# Patient Record
Sex: FEMALE | Race: BLACK OR AFRICAN AMERICAN | NOT HISPANIC OR LATINO | Employment: FULL TIME | URBAN - METROPOLITAN AREA
[De-identification: names, ages, dates, MRNs, and addresses within clinical notes are randomized per-mention and may not be internally consistent; named-entity substitution may affect disease eponyms.]

---

## 2017-05-04 ENCOUNTER — ALLSCRIPTS OFFICE VISIT (OUTPATIENT)
Dept: OTHER | Facility: OTHER | Age: 62
End: 2017-05-04

## 2017-05-04 DIAGNOSIS — I83.813 VARICOSE VEINS OF BOTH LOWER EXTREMITIES WITH PAIN: ICD-10-CM

## 2017-05-25 ENCOUNTER — HOSPITAL ENCOUNTER (OUTPATIENT)
Dept: NON INVASIVE DIAGNOSTICS | Facility: CLINIC | Age: 62
Discharge: HOME | End: 2017-05-25
Payer: COMMERCIAL

## 2017-05-25 DIAGNOSIS — I83.813 VARICOSE VEINS OF BOTH LOWER EXTREMITIES WITH PAIN: ICD-10-CM

## 2017-05-25 PROCEDURE — 93970 EXTREMITY STUDY: CPT

## 2017-06-14 ENCOUNTER — ALLSCRIPTS OFFICE VISIT (OUTPATIENT)
Dept: OTHER | Facility: OTHER | Age: 62
End: 2017-06-14

## 2017-08-01 ENCOUNTER — DOCTOR'S OFFICE (OUTPATIENT)
Dept: URBAN - METROPOLITAN AREA CLINIC 137 | Facility: CLINIC | Age: 62
Setting detail: OPHTHALMOLOGY
End: 2017-08-01
Payer: COMMERCIAL

## 2017-08-01 ENCOUNTER — OPTICAL OFFICE (OUTPATIENT)
Dept: URBAN - METROPOLITAN AREA CLINIC 146 | Facility: CLINIC | Age: 62
Setting detail: OPHTHALMOLOGY
End: 2017-08-01

## 2017-08-01 DIAGNOSIS — H52.4: ICD-10-CM

## 2017-08-01 DIAGNOSIS — H52.13: ICD-10-CM

## 2017-08-01 PROCEDURE — 92014 COMPRE OPH EXAM EST PT 1/>: CPT | Performed by: OPHTHALMOLOGY

## 2017-08-01 PROCEDURE — S0500 DISPOS CONT LENS: HCPCS | Performed by: OPHTHALMOLOGY

## 2017-08-01 ASSESSMENT — REFRACTION_OUTSIDERX
OD_VA2: 20/20
OS_ADD: +2.25
OD_SPHERE: -4.75
OD_VA1: 20/20
OS_VA2: 20/20
OD_CYLINDER: SPH
OS_VA3: 20/
OS_VA1: 20/20-
OD_ADD: +2.25
OD_VA3: 20/
OS_CYLINDER: SPH
OU_VA: 20/
OS_SPHERE: -5.25

## 2017-08-01 ASSESSMENT — REFRACTION_MANIFEST
OD_VA1: 20/
OS_VA3: 20/
OD_VA3: 20/
OS_VA3: 20/
OU_VA: 20/
OU_VA: 20/
OS_VA2: 20/
OS_VA1: 20/
OD_VA3: 20/
OD_VA1: 20/
OD_VA2: 20/
OS_VA2: 20/
OD_VA2: 20/
OS_VA1: 20/

## 2017-08-01 ASSESSMENT — REFRACTION_CURRENTRX
OS_SPHERE: -5.75
OD_ADD: +2.25
OD_OVR_VA: 20/
OS_ADD: +2.25
OS_OVR_VA: 20/
OD_SPHERE: -4.75
OD_CYLINDER: SPH
OD_OVR_VA: 20/
OS_CYLINDER: SPH
OS_OVR_VA: 20/
OD_OVR_VA: 20/
OS_OVR_VA: 20/
OD_VPRISM_DIRECTION: PROGS
OS_VPRISM_DIRECTION: BF

## 2017-08-01 ASSESSMENT — REFRACTION_AUTOREFRACTION
OS_CYLINDER: +0.25
OD_SPHERE: -5.00
OS_SPHERE: -5.50
OD_CYLINDER: SPH
OS_AXIS: 059

## 2017-08-01 ASSESSMENT — CONFRONTATIONAL VISUAL FIELD TEST (CVF)
OS_FINDINGS: FULL
OD_FINDINGS: FULL

## 2017-08-01 ASSESSMENT — VISUAL ACUITY
OS_BCVA: 20/20
OD_BCVA: 20/40

## 2017-08-01 ASSESSMENT — SPHEQUIV_DERIVED: OS_SPHEQUIV: -5.375

## 2017-10-03 ENCOUNTER — ALLSCRIPTS OFFICE VISIT (OUTPATIENT)
Dept: OTHER | Facility: OTHER | Age: 62
End: 2017-10-03

## 2017-10-03 ENCOUNTER — GENERIC CONVERSION - ENCOUNTER (OUTPATIENT)
Dept: OTHER | Facility: OTHER | Age: 62
End: 2017-10-03

## 2017-10-03 DIAGNOSIS — Z12.31 ENCOUNTER FOR SCREENING MAMMOGRAM FOR MALIGNANT NEOPLASM OF BREAST: ICD-10-CM

## 2017-10-03 DIAGNOSIS — R29.890 LOSS OF HEIGHT: ICD-10-CM

## 2017-10-17 ENCOUNTER — ALLSCRIPTS OFFICE VISIT (OUTPATIENT)
Dept: OTHER | Facility: OTHER | Age: 62
End: 2017-10-17

## 2018-01-11 NOTE — PROGRESS NOTES
Assessment    1  Varicose veins of bilateral lower extremities with pain (454 8) (U56 878)    Plan    1  Begin or continue regular aerobic exercise  Gradually work up to at least {count1}   sessions of {dur1} of exercise a week ; Status:Complete;   Done: [id number]   2  Support stockings can help keep the blood from pooling in the small veins in your feet   and legs ; Status:Complete;   Done: 92NOH7064   3  Gradient compression stocking, below knee, 20-30mm Hg, each; Status:Complete;     Done: [id number]   4  VAS REFLUX LOWER LIMB   ; Status:Active; Requested for:[id number];    5  Follow Up After Tests Complete Evaluation and Treatment  Follow-up  Status: Complete    Done: [id number]    Discussion/Summary  Discussion Summary: This patient has bilateral lower extremity varicosities with associated aching, itching, throbbing pain  She reports history of venous procedure to bilateral legs (unsure if ligation/stripping- no incisions, states it was not EVLT) in early 2002 at [address] [place] [address] and sclerotherapy (will request report)  Since that time she has been wearing prescription grade compression on a daily basis  She recently lost 30 pounds and she exercises daily  Despite conservative management her symptoms have continued to worsen  Will check a venous reflux assessment and she will return to the office with a physician for review  Chief Complaint  Chief Complaint Free Text Note Form: "My legs have been throbbing and aching for 3 years "       History of Present Illness  Varicose Veins [facility]: The patient is being seen for a consultation regarding varicose veins  Symptoms:  bilateral bulging veins, bilateral spider veins, itching bilaterally and bilateral leg pain, but no leg swelling, no muscle cramps, no stasis dermatitis, no cellulitis, no hyperpigmentation, no venous ulcers, no dry skin and no bleeding  The patient describes the pain as aching, itching and throbbing     This patient has no history of DVT, pulmonary embolism, superficial venous thrombosis, or a hypercoagulable state  Evaluation and Treatment History: This patient has had previous surgical treatment which has included injection sclerotherapy ? "venous procedure I don't know what it was" in 2002  Current treatment includes This patient is using knee high  20-30 mmHg compression hose  This patient is exercising   This patient is attempting weight management   This patient is using periodic leg elevation   Efficacy of conservative treatment: The conservative measures have not helped the patient's symptoms  Free Text HPI: This patient is new to our practice, referred by Dr Corry Talamantes for evaluation of painful varicose veins  Patient complains of bilateral lower extremity itching, throbbing, aching pain  She is a nurse at [employer], standing long periods, which 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  She reports history of venous procedure in 2002, by Dr Pierre Prieto, plastic surgeon  She states that she was "out" for the procedure, but says it was not a laser procedure, yet does not have leg incisions consistent with ligation or stripping  She thinks it was "just injections " One leg was done at a time  (Will request report from [employer])  She has been wearing prescription great compression daily since that time  She elevates periodically  She recently lost 30 pounds and goes to the gym on a near daily basis  Despite these conservative therapies she continues to have worsening pain to the legs  Review of Systems  Complete Female - Vasc:   Constitutional: No fever or chills, feels well, no tiredness, no recent weight gain or weight loss  Eyes: no sudden vision loss, no blurred vision and no double vision, but no eye pain, no eyesight problems, no dryness of the eyes, eyes not red, no purulent discharge from the eyes, no itching of the eyes and wears glasses  ENT: no loss of hearing, no nosebleeds, no hoarseness     Cardiovascular: no leg pain with walking and no bleeding veins, but regular heart rate, no chest pain, no intermittent leg claudication, painful veins and no palpitations  Respiratory: No sob, no wheezing, no cough, no sob with exertion, no orthopnea  Gastrointestinal: No nausea, No vomiting, no diarrhea, no blood in stool  Genitourinary: no dysuria, no Hematuria,no urinary incontinence  Musculoskeletal: no limb pain, no limb swelling  Integumentary: no rash, no lesions, no wounds, no ulcer  Neurological: no dementia, no headache, no numbness, no limb weakness, no dizziness, no difficulty walking  Psychiatric: depression and no mood disorder, but no anxiety  Hematologic/Lymphatic: no bleeding disorder, no easy bruising  ROS Reviewed:   ROS reviewed  Active Problems    1  Diabetes mellitus type 2, controlled, without complications (629 12) (H92 7)   2  Essential hypertension (401 9) (I10)   3  Hypercholesterolemia (272 0) (E78 00)   4  Osteopenia (733 90) (M85 80)    Past Medical History    1  Diabetes mellitus type 2, controlled, without complications (162 34) (U48 4)   2  Essential hypertension (401 9) (I10)  Active Problems And Past Medical History Reviewed: The active problems and past medical history were reviewed and updated today  Surgical History    1  History of Hysterectomy  Surgical History Reviewed: The surgical history was reviewed and updated today  Family History  Family History    1  No pertinent family history  Family History Reviewed: The family history was reviewed and updated today  Social History    · Never a smoker  Social History Reviewed: The social history was reviewed and updated today  Current Meds   1  AmLODIPine Besylate 10 MG Oral Tablet; Therapy: (Recorded:04May2017) to Recorded   2  Ammonium Lactate 12 % External Cream;   Therapy: (Recorded:04May2017) to Recorded   3  Cymbalta 60 MG Oral Capsule Delayed Release Particles; Therapy: (Recorded:04May2017) to Recorded   4   Finacea 15 % External Gel;   Therapy: (Recorded:04May2017) to Recorded   5  HydrOXYzine HCl - 25 MG Oral Tablet; Therapy: (Recorded:04May2017) to Recorded   6  Janumet  MG Oral Tablet; Therapy: (Recorded:04May2017) to Recorded   7  Rosuvastatin Calcium 5 MG Oral Tablet; Therapy: (Recorded:04May2017) to Recorded   8  Tazorac 0 1 % External Gel; Therapy: (Recorded:04May2017) to Recorded   9  Valsartan-Hydrochlorothiazide 320-25 MG Oral Tablet; Therapy: (Recorded:04May2017) to Recorded  Medication List Reviewed: The medication list was reviewed and updated today  Allergies    1  Lexapro TABS    2  Food    Vitals  Vital Signs    Recorded: 37PZU1838 04:11PM   Heart Rate 76, L Radial   Pulse Quality Normal, L Radial   Respiration Quality Normal   Respiration 16   Systolic 665, LUE, Sitting   Diastolic 82, LUE, Sitting   Height 5 ft 8 5 in   Weight 207 lb    BMI Calculated 31 02   BSA Calculated 2 09     Physical Exam    Femoral: right 2+ and left 2+  Posterior tibialis: right 2+ and left 2+  Dorsalis pedis: right 0 and left 1+  Distal Pulse Exam:     Extremities: No upper or lower extremity edema  LE Varicose Veins: left leg truncal veins, right leg reticular veins, left leg reticular veins, right leg spider veins and left leg spider veins  The heart rate was normal  The rhythm was regular  Heart sounds: normal S1 and normal S2    Murmurs: No murmurs were heard  Pulmonary   Respiratory effort: No increased work of breathing or signs of respiratory distress  Psychiatric   Orientation to person, place and time: Normal    Eyes   Conjunctiva and lids: No swelling, erythema, or discharge  Ears, Nose, Mouth, and Throat   Hearing: Normal    Neurologic Sensory exam normal   Motor skills intact  Musculoskeletal   Gait and station: Normal    Muscle strength/tone: Normal    Skin   Skin and subcutaneous tissue: Normal without rashes or lesions     Venous Disease: No lipodermatosclerosis, stasis dermatitis, hyperpigmentation, or atrophie devon noted on exam       Future Appointments    Date/Time Provider Specialty Site   06/14/2017 03:45 PM Doctor, Sunday MD [facility]     Signatures   Electronically signed by : Monique Mathias; May  4 2017  4:56PM EST                       (Author)    Electronically signed by : Vernon Mcneil DO; May  8 2017  7:00AM EST                       (Author)

## 2018-01-12 NOTE — PROGRESS NOTES
Assessment    1  Spider veins of both lower extremities (454 9) (I83 93)   2  Varicose veins of bilateral lower extremities with pain (454 8) (W26 064)    Plan  Spider veins of both lower extremities    · Follow-up PRN Evaluation and Treatment  Follow-up  Status: Complete  Done:  [id number]   Ordered; For: Spider veins of both lower extremities; Ordered By: DoctorShaye Performed:  Due: 31YLP3974    Discussion/Summary  Discussion Summary:   Patient is a 58 yo F w/ HTN, HLD, DM, osteopenia, varicose veins   1) venous disease  -reviewed reflux study which shows isolated L CFV reflux and R GSV reflux at the going and calf, however the thigh and knee segment is not visualized, likely removed as part of her prior procedure  -prior RLE venous procedure by Dr Ben Carlos ~'02, unclear what exactly was performed  -discussed pathophysiology of venous disease and treatment options  No superficial disease on L and thus no surgical intervention recommended; on the right, could consider high ligation of GSV and ant/post branches but not a candidate for EVLT due to prior treatment and removal of middle segment of vein; difficult to predict if this would give her significant symptom relief  -continue your conservative management with daily use of compression stockings, leg elevation, and stay active; continue your weight loss program; if you would like to buy stockings online, [employer] and [employer] are excellent brands; look for "20-30mmHg gradient compression stockings"; new Rx given today; wants to try waist-high style  2) spider veins  -spider veins injected at last visit with 10cc of 0 5% asclera total  focused on L posterior knee, L thigh, B ankles, R thigh  -tolerated procedure without complications, injected well  -partial resolution of veins; a few areas of darkening  -will plan for second session $500, 2 hrs to treat lateral left knee area, B anterior thighs     Counseling Documentation With Imm: The patient was counseled regarding diagnostic results, instructions for management, risks and benefits of treatment options, importance of compliance with treatment  Chief Complaint  Chief Complaint Free Text Note Form: "I am here for follow-up"    Patient is s/p sclerotherapy bilat  She feels the veins look better and knows she needs another treatment  She has been wearing OTC compression stockings  She feels a "crawling" sensation on her legs at times  She was not able to the prescription strength yet because of insurance coverage  She notices "bumps" from the injection sites but is overall happy with the procedure  History of Present Illness  HPI: Patient is a 58 yo F w/ HTN, HLD, DM, osteopenia, varicose veins presents for spider vein sclero injections    Patient complains of pain in her B legs  She has a large number of reticular and spider veins and some of these clusters are painful  She has minimal edema  She gets heaviness, achiness, itching, worse at the end of the day  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  She denies hx of DVT  no family hx of varicose veins  Mom had significant PAD w/ B AKAs  Since last visit, she has been wearing her compression stockings and notices some improvement  She would like to try waist-high version  She elevates her legs at the end of the day  She is active and has been losing weight (intentionally) but recently gained it back after not feeling well  She is doing well after injections  She is happy with results but does have significant residual veins  She is not wearing her compression  She has a few dark spots  Review of Systems  Complete Female - Vasc:   Cardiovascular: no bleeding veins, but painful veins  ROS Reviewed:   ROS reviewed  Active Problems    1  Depression (311) (F32 9)   2  Diabetes mellitus type 2, controlled, without complications (770 15) (W74 0)   3  Drug-induced akathisia (333 99,E980 5) (G25 71)   4   Encounter for screening mammogram for malignant neoplasm of breast (V76 12)   (Z12 31)   5  Essential hypertension (401 9) (I10)   6  Hypercholesterolemia (272 0) (E78 00)   7  Loss of height (781 91) (R29 890)   8  Osteopenia (733 90) (M85 80)   9  Spider veins of both lower extremities (454 9) (I83 93)   10  Varicose veins of bilateral lower extremities with pain (454 8) (F47 966)    Past Medical History    1  Diabetes mellitus type 2, controlled, without complications (003 32) (K66 5)   2  Essential hypertension (401 9) (I10)    Surgical History    1  History of Hysterectomy    Family History  Mother    1  Family history of diabetes mellitus (V18 0) (Z83 3)   2  Family history of stroke (V17 1) (Z82 3)  Father    3  Unknown family medical history    Social History    · Never a smoker   · No alcohol use   · No illicit drug use    Current Meds   1  AmLODIPine Besylate 10 MG Oral Tablet; TAKE 1 TABLET DAILY; Therapy: (Recorded:03Oct2017) to Recorded   2  Finacea 15 % External Gel; Therapy: (Recorded:04May2017) to Recorded   3  HydrOXYzine HCl - 25 MG Oral Tablet; TAKE 1 TABLET 3 TIMES DAILY AS NEEDED; Therapy: (450 39 173) to Recorded   4  Janumet  MG Oral Tablet; Take 1 tablet(s) twice a day; Therapy: (450 39 173) to Recorded   5  Rosuvastatin Calcium 5 MG Oral Tablet; TAKE 1 TABLET DAILY; Therapy: (450 39 173) to Recorded   6  Tazorac 0 1 % External Gel; Therapy: (Recorded:04May2017) to Recorded   7  Valsartan-Hydrochlorothiazide 320-25 MG Oral Tablet; TAKE 1 TABLET DAILY; Therapy: (Recorded:03Oct2017) to Recorded    Allergies    1  Lexapro TABS    2   Food    Vitals  Vital Signs    Recorded: 26PFF0576 03:53PM   Temperature 98 2 F, Tympanic   Heart Rate 68, L Radial   Pulse Quality Normal, L Radial   Respiration Quality Normal   Respiration 16   Systolic 222, LUE, Sitting   Diastolic 90, LUE, Sitting   Height 5 ft 6 in   Weight 217 lb    BMI Calculated 35 02   BSA Calculated 2 07     Physical Exam    LE Varicose Veins: right leg reticular veins, left leg reticular veins, right leg spider veins and left leg spider veins     Skin   Venous Disease: (successful treatment at posterior knee; residual spiders lateral knee, B thighs; two small dark spots R thigh medial)      Results/Data            Future Appointments    Date/Time Provider Specialty Site   01/10/2018 03:00 PM Clementina Chandler MD Vascular Surgery [facility]     Signatures   Electronically signed by : Clementina Chandler MD; Oct 18 2017 11:55AM EST                       (Author)

## 2018-01-13 VITALS
SYSTOLIC BLOOD PRESSURE: 128 MMHG | WEIGHT: 217 LBS | BODY MASS INDEX: 34.87 KG/M2 | HEIGHT: 66 IN | DIASTOLIC BLOOD PRESSURE: 90 MMHG | HEART RATE: 68 BPM | RESPIRATION RATE: 16 BRPM | TEMPERATURE: 98.2 F

## 2018-01-13 VITALS
WEIGHT: 216.8 LBS | OXYGEN SATURATION: 98 % | HEART RATE: 84 BPM | BODY MASS INDEX: 34.84 KG/M2 | RESPIRATION RATE: 16 BRPM | SYSTOLIC BLOOD PRESSURE: 128 MMHG | HEIGHT: 66 IN | TEMPERATURE: 98.5 F | DIASTOLIC BLOOD PRESSURE: 80 MMHG

## 2018-01-13 VITALS
HEART RATE: 86 BPM | RESPIRATION RATE: 16 BRPM | WEIGHT: 208.38 LBS | DIASTOLIC BLOOD PRESSURE: 80 MMHG | BODY MASS INDEX: 30.86 KG/M2 | SYSTOLIC BLOOD PRESSURE: 130 MMHG | HEIGHT: 69 IN

## 2018-01-14 VITALS
SYSTOLIC BLOOD PRESSURE: 120 MMHG | RESPIRATION RATE: 16 BRPM | WEIGHT: 207 LBS | HEIGHT: 69 IN | BODY MASS INDEX: 30.66 KG/M2 | DIASTOLIC BLOOD PRESSURE: 82 MMHG | HEART RATE: 76 BPM

## 2018-02-08 ENCOUNTER — OFFICE VISIT (OUTPATIENT)
Dept: URGENT CARE | Age: 63
End: 2018-02-08
Payer: COMMERCIAL

## 2018-02-08 VITALS
TEMPERATURE: 98.6 F | HEIGHT: 69 IN | HEART RATE: 82 BPM | OXYGEN SATURATION: 97 % | DIASTOLIC BLOOD PRESSURE: 88 MMHG | WEIGHT: 214.6 LBS | RESPIRATION RATE: 18 BRPM | SYSTOLIC BLOOD PRESSURE: 126 MMHG | BODY MASS INDEX: 31.78 KG/M2

## 2018-02-08 DIAGNOSIS — R31.9 URINARY TRACT INFECTION WITH HEMATURIA, SITE UNSPECIFIED: Primary | ICD-10-CM

## 2018-02-08 DIAGNOSIS — N39.0 URINARY TRACT INFECTION WITH HEMATURIA, SITE UNSPECIFIED: Primary | ICD-10-CM

## 2018-02-08 DIAGNOSIS — R30.0 DYSURIA: ICD-10-CM

## 2018-02-08 LAB
PROT UR STRIP-MCNC: ABNORMAL MG/DL
SL AMB  POCT GLUCOSE, UA: NEGATIVE
SL AMB LEUKOCYTE ESTERASE,UA: ABNORMAL
SL AMB POCT BILIRUBIN,UA: NEGATIVE
SL AMB POCT BLOOD,UA: NEGATIVE
SL AMB POCT CLARITY,UA: ABNORMAL
SL AMB POCT COLOR,UA: YELLOW
SL AMB POCT KETONES,UA: NEGATIVE
SL AMB POCT NITRITE,UA: NEGATIVE
SL AMB POCT PH,UA: 6
SL AMB POCT SPECIFIC GRAVITY,UA: 1.01

## 2018-02-08 PROCEDURE — G0382 LEV 3 HOSP TYPE B ED VISIT: HCPCS | Performed by: FAMILY MEDICINE

## 2018-02-08 PROCEDURE — 81002 URINALYSIS NONAUTO W/O SCOPE: CPT | Performed by: FAMILY MEDICINE

## 2018-02-08 PROCEDURE — 87186 SC STD MICRODIL/AGAR DIL: CPT | Performed by: FAMILY MEDICINE

## 2018-02-08 PROCEDURE — 87086 URINE CULTURE/COLONY COUNT: CPT | Performed by: FAMILY MEDICINE

## 2018-02-08 PROCEDURE — 87077 CULTURE AEROBIC IDENTIFY: CPT | Performed by: FAMILY MEDICINE

## 2018-02-08 RX ORDER — AMLODIPINE BESYLATE 10 MG/1
1 TABLET ORAL DAILY
COMMUNITY
End: 2018-10-31 | Stop reason: SDUPTHER

## 2018-02-08 RX ORDER — VALSARTAN AND HYDROCHLOROTHIAZIDE 320; 25 MG/1; MG/1
1 TABLET, FILM COATED ORAL DAILY
COMMUNITY
End: 2019-03-05 | Stop reason: SDUPTHER

## 2018-02-08 RX ORDER — AMMONIUM LACTATE 12 G/100G
LOTION TOPICAL DAILY PRN
Refills: 0 | COMMUNITY
Start: 2017-12-20 | End: 2019-08-20 | Stop reason: SDUPTHER

## 2018-02-08 RX ORDER — AZELAIC ACID 0.15 G/G
AEROSOL, FOAM TOPICAL 2 TIMES DAILY
Refills: 0 | COMMUNITY
Start: 2018-01-20 | End: 2021-12-02 | Stop reason: SDUPTHER

## 2018-02-08 RX ORDER — ESZOPICLONE 3 MG/1
3 TABLET, FILM COATED ORAL
Refills: 0 | COMMUNITY
Start: 2017-11-28

## 2018-02-08 RX ORDER — BUPROPION HCL 150 MG
1 TABLET, EXTENDED RELEASE 24 HR ORAL DAILY
Refills: 0 | COMMUNITY
Start: 2017-12-11

## 2018-02-08 RX ORDER — TAZAROTENE 1 MG/G
CREAM TOPICAL DAILY
Refills: 0 | COMMUNITY
Start: 2018-01-15

## 2018-02-08 RX ORDER — CIPROFLOXACIN 500 MG/1
500 TABLET, FILM COATED ORAL EVERY 12 HOURS SCHEDULED
Qty: 20 TABLET | Refills: 0 | Status: SHIPPED | OUTPATIENT
Start: 2018-02-08 | End: 2018-02-18

## 2018-02-08 RX ORDER — ROSUVASTATIN CALCIUM 5 MG/1
1 TABLET, COATED ORAL DAILY
COMMUNITY
End: 2018-08-01 | Stop reason: SDUPTHER

## 2018-02-08 RX ORDER — ALPRAZOLAM 0.5 MG/1
1 TABLET, EXTENDED RELEASE ORAL 3 TIMES DAILY PRN
Refills: 0 | COMMUNITY
Start: 2017-12-08 | End: 2019-01-02 | Stop reason: ALTCHOICE

## 2018-02-08 NOTE — PROGRESS NOTES
[facility]        NAME: Ines Randle is a 60 y o  female  : 1955    MRN: [id number]  DATE: 2018  TIME: 5:42 PM    Assessment and Plan   Urinary tract infection with hematuria, site unspecified [N39 0, R31 9]  1  Urinary tract infection with hematuria, site unspecified  ciprofloxacin (CIPRO) 500 mg tablet   2  Dysuria  POCT urine dip    Urine culture         Patient Instructions     Patient Instructions   Cipro twice a day until finished (please take probiotics)  Increase fluids (cranberry juice)  Tylenol, or ibuprofen (Advil/Motrin) as needed  Recheck/follow-up with family physician or GYN as needed  Please go to the hospital emergency department if worse  Chief Complaint     Chief Complaint   Patient presents with    Possible UTI     x 1 week with dysuria, urgency and frequency  Denies fever, suprapubic or flank pain  History of Present Illness   Ines Randle presents to the clinic c/o    Dysuria and urinary frequency; vomiting; no chills/fever; no back pain; no vaginal discharge or lesions per patient        Review of Systems   Review of Systems   Constitutional: Positive for fatigue  HENT: Negative  Respiratory: Negative  Cardiovascular: Negative  Gastrointestinal: Positive for vomiting  Genitourinary: Positive for dysuria, frequency and urgency           Current Medications     Long-Term Prescriptions   Medication Sig Dispense Refill    ALPRAZOLAM XR 0 5 MG 24 hr tablet Take 1 tablet by mouth 3 (three) times a day as needed  0    amLODIPine (NORVASC) 10 mg tablet Take 1 tablet by mouth daily      ammonium lactate (LAC-HYDRIN) 12 % lotion daily as needed  0    eszopiclone (LUNESTA) tablet Take 3 mg by mouth daily at bedtime as needed  0    FINACEA 15 % FOAM 2 (two) times a day  0    rosuvastatin (CRESTOR) 5 mg tablet Take 1 tablet by mouth daily      sitaGLIPtin-metFORMIN (JANUMET)  MG per tablet Take 1 tablet by mouth 2 (two) times a day      tazarotene (AVAGE) 0 1 % cream daily  0    valsartan-hydrochlorothiazide (DIOVAN-HCT) 320-25 MG per tablet Take 1 tablet by mouth daily      WELLBUTRIN  MG 24 hr tablet Take 1 tablet by mouth daily  0       Current Allergies     Allergies as of 02/08/2018 - Reviewed 02/08/2018   Allergen Reaction Noted    Escitalopram Rash 05/04/2017    Sulfa antibiotics Hives and Rash 02/08/2018    Food Other (See Comments) 05/04/2017            The following portions of the patient's history were reviewed and updated as appropriate: allergies, current medications, past family history, past medical history, past social history, past surgical history and problem list     Objective   /88 (BP Location: Left arm, Patient Position: Sitting, Cuff Size: Standard)   Pulse 82   Temp 98 6 °F (37 °C) (Oral)   Resp 18   Ht 5' 8 5" (1 74 m)   Wt 97 3 kg (214 lb 9 6 oz)   SpO2 97%   BMI 32 16 kg/m²        Physical Exam     Physical Exam   Constitutional: She is oriented to person, place, and time  She appears well-developed and well-nourished  Musculoskeletal:   Generalized discomfort of midback area   Neurological: She is alert and oriented to person, place, and time  No nuchal rigidity   Skin: Skin is warm  Good turgor   Psychiatric: Her behavior is normal    Nursing note and vitals reviewed

## 2018-02-08 NOTE — PATIENT INSTRUCTIONS
Cipro twice a day until finished (please take probiotics)  Increase fluids (cranberry juice)  Tylenol, or ibuprofen (Advil/Motrin) as needed  Recheck/follow-up with family physician or GYN as needed  Please go to the hospital emergency department if worse

## 2018-02-11 ENCOUNTER — TELEPHONE (OUTPATIENT)
Dept: INTERNAL MEDICINE CLINIC | Age: 63
End: 2018-02-11

## 2018-02-11 DIAGNOSIS — N39.0 URINARY TRACT INFECTION WITHOUT HEMATURIA, SITE UNSPECIFIED: Primary | ICD-10-CM

## 2018-02-11 LAB — BACTERIA UR CULT: ABNORMAL

## 2018-02-11 RX ORDER — NITROFURANTOIN 25 MG/5ML
50 SUSPENSION ORAL 4 TIMES DAILY
Qty: 200 ML | Refills: 0 | Status: SHIPPED | OUTPATIENT
Start: 2018-02-11 | End: 2019-01-02 | Stop reason: ALTCHOICE

## 2018-02-16 DIAGNOSIS — E11.9 TYPE 2 DIABETES MELLITUS WITHOUT COMPLICATION, WITHOUT LONG-TERM CURRENT USE OF INSULIN (HCC): Primary | ICD-10-CM

## 2018-04-10 ENCOUNTER — OPTICAL OFFICE (OUTPATIENT)
Dept: URBAN - METROPOLITAN AREA CLINIC 146 | Facility: CLINIC | Age: 63
Setting detail: OPHTHALMOLOGY
End: 2018-04-10

## 2018-04-10 DIAGNOSIS — H52.13: ICD-10-CM

## 2018-04-10 PROCEDURE — S0500 DISPOS CONT LENS: HCPCS | Performed by: OPHTHALMOLOGY

## 2018-08-01 ENCOUNTER — OFFICE VISIT (OUTPATIENT)
Dept: INTERNAL MEDICINE CLINIC | Facility: CLINIC | Age: 63
End: 2018-08-01
Payer: COMMERCIAL

## 2018-08-01 VITALS
DIASTOLIC BLOOD PRESSURE: 86 MMHG | OXYGEN SATURATION: 95 % | TEMPERATURE: 98.4 F | BODY MASS INDEX: 33.74 KG/M2 | HEIGHT: 67 IN | SYSTOLIC BLOOD PRESSURE: 132 MMHG | WEIGHT: 215 LBS | HEART RATE: 82 BPM

## 2018-08-01 DIAGNOSIS — E78.2 MIXED HYPERLIPIDEMIA: ICD-10-CM

## 2018-08-01 DIAGNOSIS — E11.9 TYPE 2 DIABETES MELLITUS WITHOUT COMPLICATION, WITHOUT LONG-TERM CURRENT USE OF INSULIN (HCC): ICD-10-CM

## 2018-08-01 DIAGNOSIS — Z71.85 ENCOUNTER FOR COUNSELING REGARDING IMMUNIZATION: ICD-10-CM

## 2018-08-01 DIAGNOSIS — Z13.5 SCREENING FOR DIABETIC RETINOPATHY: Primary | ICD-10-CM

## 2018-08-01 DIAGNOSIS — M79.89 LEFT LEG SWELLING: ICD-10-CM

## 2018-08-01 DIAGNOSIS — I10 ESSENTIAL HYPERTENSION: ICD-10-CM

## 2018-08-01 PROCEDURE — 3079F DIAST BP 80-89 MM HG: CPT | Performed by: INTERNAL MEDICINE

## 2018-08-01 PROCEDURE — 99214 OFFICE O/P EST MOD 30 MIN: CPT | Performed by: INTERNAL MEDICINE

## 2018-08-01 PROCEDURE — 3075F SYST BP GE 130 - 139MM HG: CPT | Performed by: INTERNAL MEDICINE

## 2018-08-01 RX ORDER — ROSUVASTATIN CALCIUM 5 MG/1
5 TABLET, COATED ORAL DAILY
Qty: 90 TABLET | Refills: 3 | Status: SHIPPED | OUTPATIENT
Start: 2018-08-01 | End: 2019-03-05 | Stop reason: SDUPTHER

## 2018-08-01 RX ORDER — CLONAZEPAM 2 MG/1
2 TABLET ORAL AS NEEDED
COMMUNITY
Start: 2018-05-31 | End: 2022-03-17

## 2018-08-01 NOTE — PROGRESS NOTES
Assessment/Plan:    1  Hypertension  Her BP is well controlled at 132/86 today at the office  She will continue on her current medications  She will have a renal function panel and an albumin/creatinine ratio drawn prior to her next appointment  2  Hyperlipidemia  She has a history of hyperlipidemia and therefore will have a lipid panel drawn prior to her next visit  She will continue taking her current medications  3  Diabetes  She reports she monitors her sugar levels which average from 110-120  She will have an HgbA1C and a fasting blood sugar level drawn prior to her next appointment  4  Unilateral left leg swelling  She reports she had a fall about 3 weeks ago and her left leg is still swollen since then  She states it has improved since then as her leg was very dark  She reports it is improved in the morning when she wakes up but as she goes on about her day, her swelling returns  She will have a Doppler of the left leg for evaluation of a blood clot status post fall 3 weeks ago  5  Healthcare Maintenance  She was advised about the benefits of the new Shingrix vaccine and was given a prescription  She will have it done at her earliest convenience  She will have follow up with us in 1 week  Diagnoses and all orders for this visit:    Screening for diabetic retinopathy  -     Ambulatory referral to Ophthalmology; Future    Type 2 diabetes mellitus without complication, without long-term current use of insulin (HCC)  -     sitaGLIPtin-metFORMIN (JANUMET)  MG per tablet; Take 1 tablet by mouth 2 (two) times a day  -     HEMOGLOBIN A1C W/ EAG ESTIMATION; Future  -     Glucose, fasting; Future    Mixed hyperlipidemia  -     rosuvastatin (CRESTOR) 5 mg tablet; Take 1 tablet (5 mg total) by mouth daily  -     Lipid panel; Future    Left leg swelling  -     VAS lower limb venous duplex study, unilateral/limited;  Future    Encounter for counseling regarding immunization  -     Zoster Vaccine Recombinant IM    Essential hypertension  -     Renal function panel; Future  -     Microalbumin / creatinine urine ratio    Other orders  -     clonazePAM (KlonoPIN) 2 mg tablet; Take 2 mg by mouth as needed          Subjective:      Patient ID: Flory Paz is a 60 y o  female  Latosha Cummings is a 60year old female who presents today for a 6 month follow up regarding her hyperlipidemia, hypertension, and diabetes  She reports she had a fall about 3 weeks ago and her left leg is still swollen since then  She states it has improved since then as her leg was very dark  She reports it is improved in the morning when she wakes up but as she goes on about her day, her swelling returns  She reports her skin is irritated, especially around her neck, but attributes this to her fatigue and agitation  She is agitated due to her daughter going off her medication  Otherwise, she reports she is doing very well overall and has no other significant complaints  The following portions of the patient's history were reviewed and updated as appropriate: allergies, current medications, past family history, past medical history, past social history, past surgical history and problem list     Review of Systems   Constitutional: Negative for diaphoresis, fatigue, fever and unexpected weight change  HENT: Negative for congestion, hearing loss, nosebleeds, postnasal drip, rhinorrhea, sinus pain and sinus pressure  Eyes: Negative  Respiratory: Negative for cough, shortness of breath and stridor  Cardiovascular: Positive for leg swelling (uilateral left)  Negative for chest pain and palpitations  Gastrointestinal: Negative for abdominal distention, abdominal pain, anal bleeding, blood in stool, constipation, diarrhea, nausea, rectal pain and vomiting  Genitourinary: Negative for difficulty urinating, dysuria, flank pain, frequency, hematuria and pelvic pain     Musculoskeletal: Negative for arthralgias, back pain, gait problem, joint swelling, myalgias and neck pain  Skin: Positive for wound (injury to leg)  Skin irritated   Neurological: Negative for dizziness and facial asymmetry  Psychiatric/Behavioral: Positive for agitation  Negative for behavioral problems, confusion, decreased concentration, dysphoric mood, hallucinations, self-injury and sleep disturbance  The patient is not nervous/anxious and is not hyperactive  Objective:      /86 (BP Location: Left arm, Patient Position: Sitting, Cuff Size: Adult)   Pulse 82   Temp 98 4 °F (36 9 °C) (Oral)   Ht 5' 6 65" (1 693 m)   Wt 97 5 kg (215 lb)   SpO2 95%   BMI 34 02 kg/m²          Physical Exam   Constitutional: She is oriented to person, place, and time  She appears well-developed and well-nourished  No distress  obese   HENT:   Head: Normocephalic and atraumatic  Mouth/Throat: No oropharyngeal exudate  Eyes: Conjunctivae are normal  Right eye exhibits no discharge  Left eye exhibits no discharge  No scleral icterus  Cardiovascular: Normal rate  No murmur heard  Pulmonary/Chest: No respiratory distress  She has no wheezes  She has no rales  Abdominal: She exhibits no distension  There is no tenderness  There is no rebound and no guarding  Musculoskeletal: She exhibits edema (left)  She exhibits no tenderness or deformity  Neurological: She is alert and oriented to person, place, and time  She has normal reflexes  She displays normal reflexes  Skin: No rash noted  She is not diaphoretic  Psychiatric: She has a normal mood and affect  Her behavior is normal  Thought content normal          Scribe Signature and Attestation:  By signing my name below, Vidhi DEMPSEY attest that this documentation has been prepared under the direction and in the presence of Dr Sawyer Castillo MD  Electronically signed: Cecil Garcia   8/1/18    Provider Attestation:  Dr Sawyer DEMPSEY, personally performed the services described in this documentation  All medical record entries made by the scribe were at my direction and in my presence  I have reviewed the chart and discharge instructions (if applicable) and agree that the record reflects my personal performance and is accurate and complete  Dr Jac Whitney MD  8/1/18

## 2018-08-01 NOTE — PATIENT INSTRUCTIONS
1  She will have a renal function panel and an albumin/creatinine ratio drawn prior to her next appointment  2  She will have a lipid panel drawn prior to her next visit  3  She will have an HgbA1C and a fasting blood sugar level drawn prior to her next appointment  4  She will have a Doppler of the left leg for evaluation of a blood clot status post fall 3 weeks ago  5  She was advised about the benefits of the new Shingrix vaccine and was given a prescription  She will have it done at her earliest convenience  6  She will have follow up with us in 1 week

## 2018-10-17 LAB
ALBUMIN SERPL-MCNC: 4.3 G/DL (ref 3.6–5.1)
ALBUMIN/CREAT UR: 8 MCG/MG CREAT
BUN SERPL-MCNC: 10 MG/DL (ref 7–25)
BUN/CREAT SERPL: NORMAL (CALC) (ref 6–22)
CALCIUM SERPL-MCNC: 9.5 MG/DL (ref 8.6–10.4)
CHLORIDE SERPL-SCNC: 108 MMOL/L (ref 98–110)
CHOLEST SERPL-MCNC: 231 MG/DL
CHOLEST/HDLC SERPL: 3.3 (CALC)
CO2 SERPL-SCNC: 28 MMOL/L (ref 20–32)
CREAT SERPL-MCNC: 0.87 MG/DL (ref 0.5–0.99)
CREAT UR-MCNC: 133 MG/DL (ref 20–275)
EST. AVERAGE GLUCOSE BLD GHB EST-MCNC: 126 (CALC)
EST. AVERAGE GLUCOSE BLD GHB EST-SCNC: 7 (CALC)
GLUCOSE SERPL-MCNC: 88 MG/DL (ref 65–99)
HBA1C MFR BLD: 6 % OF TOTAL HGB
HDLC SERPL-MCNC: 71 MG/DL
LDLC SERPL CALC-MCNC: 143 MG/DL (CALC)
MICROALBUMIN UR-MCNC: 1 MG/DL
NONHDLC SERPL-MCNC: 160 MG/DL (CALC)
PHOSPHATE SERPL-MCNC: 3.5 MG/DL (ref 2.5–4.5)
POTASSIUM SERPL-SCNC: 3.7 MMOL/L (ref 3.5–5.3)
SL AMB EGFR AFRICAN AMERICAN: 82 ML/MIN/1.73M2
SL AMB EGFR NON AFRICAN AMERICAN: 70 ML/MIN/1.73M2
SODIUM SERPL-SCNC: 143 MMOL/L (ref 135–146)
TRIGL SERPL-MCNC: 72 MG/DL

## 2018-10-31 ENCOUNTER — OFFICE VISIT (OUTPATIENT)
Dept: INTERNAL MEDICINE CLINIC | Facility: CLINIC | Age: 63
End: 2018-10-31
Payer: COMMERCIAL

## 2018-10-31 VITALS
HEART RATE: 84 BPM | TEMPERATURE: 98.3 F | HEIGHT: 67 IN | DIASTOLIC BLOOD PRESSURE: 70 MMHG | WEIGHT: 204.6 LBS | BODY MASS INDEX: 32.11 KG/M2 | OXYGEN SATURATION: 93 % | SYSTOLIC BLOOD PRESSURE: 110 MMHG

## 2018-10-31 DIAGNOSIS — I10 ESSENTIAL HYPERTENSION: ICD-10-CM

## 2018-10-31 DIAGNOSIS — Z12.11 SCREENING FOR COLON CANCER: ICD-10-CM

## 2018-10-31 DIAGNOSIS — E11.9 TYPE 2 DIABETES MELLITUS WITHOUT COMPLICATION, WITHOUT LONG-TERM CURRENT USE OF INSULIN (HCC): Primary | ICD-10-CM

## 2018-10-31 DIAGNOSIS — Z20.2 EXPOSURE TO SEXUALLY TRANSMITTED DISEASE (STD): ICD-10-CM

## 2018-10-31 DIAGNOSIS — E78.5 DYSLIPIDEMIA: ICD-10-CM

## 2018-10-31 PROCEDURE — 1036F TOBACCO NON-USER: CPT | Performed by: PHYSICIAN ASSISTANT

## 2018-10-31 PROCEDURE — 99214 OFFICE O/P EST MOD 30 MIN: CPT | Performed by: PHYSICIAN ASSISTANT

## 2018-10-31 PROCEDURE — 3008F BODY MASS INDEX DOCD: CPT | Performed by: PHYSICIAN ASSISTANT

## 2018-10-31 PROCEDURE — 3074F SYST BP LT 130 MM HG: CPT | Performed by: PHYSICIAN ASSISTANT

## 2018-10-31 PROCEDURE — 3078F DIAST BP <80 MM HG: CPT | Performed by: PHYSICIAN ASSISTANT

## 2018-10-31 RX ORDER — AMLODIPINE BESYLATE 10 MG/1
10 TABLET ORAL DAILY
Qty: 90 TABLET | Refills: 1 | Status: SHIPPED | OUTPATIENT
Start: 2018-10-31 | End: 2019-06-11 | Stop reason: SDUPTHER

## 2018-10-31 NOTE — PROGRESS NOTES
Assessment/Plan:         Diagnoses and all orders for this visit:    Type 2 diabetes mellitus without complication, without long-term current use of insulin (HCC)  -     sitaGLIPtin-metFORMIN (JANUMET)  MG per tablet; Take 1 tablet by mouth 2 (two) times a day  -     HEMOGLOBIN A1C W/ EAG ESTIMATION; Future  -     CBC and differential; Future    Screening for colon cancer  -     Ambulatory referral to Gastroenterology; Future    Essential hypertension  -     amLODIPine (NORVASC) 10 mg tablet; Take 1 tablet (10 mg total) by mouth daily  -     Comprehensive metabolic panel; Future  -     CBC and differential; Future    Exposure to sexually transmitted disease (STD)  -     HIV 1/2 AG-AB combo; Future    Dyslipidemia  Comments:  resume crestor daily  pt encouraged to increase daily exercise goal of 30 min / day 5 x / week   Orders:  -     Comprehensive metabolic panel; Future        Resume crestor qhs  Continue diet and encouraged daily exercise      Subjective:      Patient ID: Brandee Basurto is a 58 y o  female      Pt here for f/u regarding htn, dm, dyslipidemia   She has been following 30 diet and has lost weight with this  Her skin has cleared up due to changes in diet   Has a lot of stress but seeing her therapist weekly     Pt saw allergist yesterday, pt had been having hive breakouts and states she is now on allegra 180mg daily and f/u with him sched for 1 month   Pt states she only takes klonopin as needed   Pt states when she gets the hives she has to go home and shower and wipe with alcohol - only occur intermittently     DM - hba1c 6  FBS 88     Dyslipidemia total 231  HDL 71  Trigs 72    Pt has not been taking crestor             Review of Systems      Past Medical History:   Diagnosis Date    Acquired acanthosis nigricans     Anxiety     Essential hypertension     Hypertension     Prediabetes     Type 2 diabetes mellitus without complication ([facility] [address] )     last assessed: 10/03/2017 Current Outpatient Prescriptions:     amLODIPine (NORVASC) 10 mg tablet, Take 1 tablet (10 mg total) by mouth daily, Disp: 90 tablet, Rfl: 1    ammonium lactate (LAC-HYDRIN) 12 % lotion, daily as needed, Disp: , Rfl: 0    clonazePAM (KlonoPIN) 2 mg tablet, Take 2 mg by mouth as needed, Disp: , Rfl:     FINACEA 15 % FOAM, 2 (two) times a day, Disp: , Rfl: 0    nitrofurantoin (FURADANTIN) 25 mg/5 mL suspension, Take 10 mL (50 mg total) by mouth 4 (four) times a day, Disp: 200 mL, Rfl: 0    rosuvastatin (CRESTOR) 5 mg tablet, Take 1 tablet (5 mg total) by mouth daily, Disp: 90 tablet, Rfl: 3    sitaGLIPtin-metFORMIN (JANUMET)  MG per tablet, Take 1 tablet by mouth 2 (two) times a day, Disp: 180 tablet, Rfl: 1    tazarotene (AVAGE) 0 1 % cream, daily, Disp: , Rfl: 0    valsartan-hydrochlorothiazide (DIOVAN-HCT) 320-25 MG per tablet, Take 1 tablet by mouth daily, Disp: , Rfl:     ALPRAZOLAM XR 0 5 MG 24 hr tablet, Take 1 tablet by mouth 3 (three) times a day as needed, Disp: , Rfl: 0    eszopiclone (LUNESTA) tablet, Take 3 mg by mouth daily at bedtime as needed, Disp: , Rfl: 0    WELLBUTRIN  MG 24 hr tablet, Take 1 tablet by mouth daily, Disp: , Rfl: 0    Allergies   Allergen Reactions    Escitalopram Rash    Sulfa Antibiotics Hives and Rash    Food Other (See Comments)     Annotation - [id number]: CORN - causes extreme sleepiness       Social History   Past Surgical History:   Procedure Laterality Date    APPENDECTOMY      TOTAL ABDOMINAL HYSTERECTOMY  1989     Family History   Problem Relation Age of Onset    Diabetes Mother     Stroke Mother     No Known Problems Father        Objective:  /70 (BP Location: Left arm, Patient Position: Sitting, Cuff Size: Adult)   Pulse 84   Temp 98 3 °F (36 8 °C) (Oral)   Ht 5' 6 65" (1 693 m)   Wt 92 8 kg (204 lb 9 6 oz)   SpO2 93%   BMI 32 38 kg/m²        Physical Exam   Constitutional: She is oriented to person, place, and time  She appears well-developed and well-nourished  No distress  HENT:   Head: Normocephalic and atraumatic  Right Ear: External ear normal    Left Ear: External ear normal    Mouth/Throat: Oropharynx is clear and moist  No oropharyngeal exudate  Eyes: Pupils are equal, round, and reactive to light  Conjunctivae and EOM are normal    Neck: Normal range of motion  Neck supple  Cardiovascular: Normal rate, regular rhythm and normal heart sounds  Pulmonary/Chest: Effort normal and breath sounds normal  No respiratory distress  She has no wheezes  She has no rales  Musculoskeletal: Normal range of motion  She exhibits no edema or deformity  Neurological: She is alert and oriented to person, place, and time  No cranial nerve deficit  Skin: Skin is warm and dry  No rash noted  She is not diaphoretic  Psychiatric: She has a normal mood and affect  Her behavior is normal  Judgment and thought content normal    Vitals reviewed

## 2018-11-07 ENCOUNTER — DOCTOR'S OFFICE (OUTPATIENT)
Dept: URBAN - METROPOLITAN AREA CLINIC 137 | Facility: CLINIC | Age: 63
Setting detail: OPHTHALMOLOGY
End: 2018-11-07
Payer: COMMERCIAL

## 2018-11-07 ENCOUNTER — OPTICAL OFFICE (OUTPATIENT)
Dept: URBAN - METROPOLITAN AREA CLINIC 146 | Facility: CLINIC | Age: 63
Setting detail: OPHTHALMOLOGY
End: 2018-11-07

## 2018-11-07 DIAGNOSIS — H52.13: ICD-10-CM

## 2018-11-07 DIAGNOSIS — H52.4: ICD-10-CM

## 2018-11-07 PROCEDURE — 92310 CONTACT LENS FITTING OU: CPT | Performed by: OPTOMETRIST

## 2018-11-07 PROCEDURE — S0500 DISPOS CONT LENS: HCPCS | Performed by: OPTOMETRIST

## 2018-11-07 PROCEDURE — 92015 DETERMINE REFRACTIVE STATE: CPT | Performed by: OPTOMETRIST

## 2018-11-07 ASSESSMENT — SPHEQUIV_DERIVED
OD_SPHEQUIV: -4.75
OS_SPHEQUIV: -5

## 2018-11-07 ASSESSMENT — REFRACTION_CURRENTRX
OD_OVR_VA: 20/
OS_ADD: +2.25
OS_OVR_VA: 20/
OS_OVR_VA: 20/
OD_OVR_VA: 20/
OD_SPHERE: -4.75
OD_VPRISM_DIRECTION: PROGS
OS_VPRISM_DIRECTION: BF
OD_OVR_VA: 20/
OD_ADD: +2.25
OS_OVR_VA: 20/
OS_SPHERE: -5.75
OD_CYLINDER: SPH
OS_CYLINDER: SPH

## 2018-11-07 ASSESSMENT — VISUAL ACUITY
OS_BCVA: 20/30
OD_BCVA: 20/50

## 2018-11-07 ASSESSMENT — KERATOMETRY
OS_AXISANGLE_DEGREES: 082
OS_K1POWER_DIOPTERS: 43.00
OS_K2POWER_DIOPTERS: 44.00
OD_K2POWER_DIOPTERS: 44.00
OD_K1POWER_DIOPTERS: 43.25
METHOD_AUTO_MANUAL: AUTO
OD_AXISANGLE_DEGREES: 100

## 2018-11-07 ASSESSMENT — REFRACTION_AUTOREFRACTION
OD_SPHERE: -4.50
OS_SPHERE: -4.75
OD_CYLINDER: -0.50
OS_CYLINDER: -0.50
OS_AXIS: 1
OD_AXIS: 110

## 2018-11-07 ASSESSMENT — REFRACTION_MANIFEST
OS_VA2: 20/
OS_VA1: 20/20-
OU_VA: 20/
OS_SPHERE: -4.75
OS_CYLINDER: SPH
OD_VA2: 20/
OD_SPHERE: -4.75
OD_VA3: 20/
OS_VA2: 20/20
OD_VA3: 20/
OD_CYLINDER: SPH
OS_VA1: 20/
OD_VA1: 20/20
OD_ADD: +2.25
OD_VA2: 20/20
OS_VA3: 20/
OS_ADD: +2.25
OS_VA3: 20/
OD_VA1: 20/
OU_VA: 20/

## 2018-11-07 ASSESSMENT — AXIALLENGTH_DERIVED
OS_AL: 25.7153
OD_AL: 25.5461

## 2018-11-07 ASSESSMENT — CONFRONTATIONAL VISUAL FIELD TEST (CVF)
OS_FINDINGS: FULL
OD_FINDINGS: FULL

## 2019-01-02 ENCOUNTER — OFFICE VISIT (OUTPATIENT)
Dept: INTERNAL MEDICINE CLINIC | Facility: CLINIC | Age: 64
End: 2019-01-02
Payer: COMMERCIAL

## 2019-01-02 VITALS
BODY MASS INDEX: 32.16 KG/M2 | DIASTOLIC BLOOD PRESSURE: 88 MMHG | SYSTOLIC BLOOD PRESSURE: 130 MMHG | WEIGHT: 203.2 LBS | TEMPERATURE: 99.1 F | OXYGEN SATURATION: 97 % | HEART RATE: 85 BPM

## 2019-01-02 DIAGNOSIS — J40 BRONCHITIS: Primary | ICD-10-CM

## 2019-01-02 PROCEDURE — 99213 OFFICE O/P EST LOW 20 MIN: CPT | Performed by: NURSE PRACTITIONER

## 2019-01-02 RX ORDER — CEFDINIR 300 MG/1
300 CAPSULE ORAL EVERY 12 HOURS SCHEDULED
Qty: 14 CAPSULE | Refills: 0 | Status: SHIPPED | OUTPATIENT
Start: 2019-01-02 | End: 2019-01-09

## 2019-01-02 RX ORDER — BENZONATATE 100 MG/1
100 CAPSULE ORAL 3 TIMES DAILY PRN
Qty: 20 CAPSULE | Refills: 0 | Status: SHIPPED | OUTPATIENT
Start: 2019-01-02 | End: 2019-06-11 | Stop reason: ALTCHOICE

## 2019-01-02 RX ORDER — DULOXETIN HYDROCHLORIDE 60 MG/1
60 CAPSULE, DELAYED RELEASE ORAL DAILY
Refills: 0 | COMMUNITY
Start: 2018-10-05 | End: 2019-01-02 | Stop reason: ALTCHOICE

## 2019-01-02 RX ORDER — TRIAMCINOLONE ACETONIDE 1 MG/G
CREAM TOPICAL
Refills: 0 | COMMUNITY
Start: 2018-11-09 | End: 2019-11-26

## 2019-01-02 RX ORDER — ARIPIPRAZOLE 5 MG/1
5 TABLET ORAL DAILY
Refills: 0 | COMMUNITY
Start: 2018-10-05 | End: 2019-01-02 | Stop reason: ALTCHOICE

## 2019-01-02 NOTE — PROGRESS NOTES
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  Tessalon prn cough  mucinex  flonase  Rest  Stay well hydrated     Diagnoses and all orders for this visit:    Bronchitis  -     cefdinir (OMNICEF) 300 mg capsule; Take 1 capsule (300 mg total) by mouth every 12 (twelve) hours for 7 days  -     benzonatate (TESSALON PERLES) 100 mg capsule; Take 1 capsule (100 mg total) by mouth 3 (three) times a day as needed for cough          Subjective:      Patient ID: Naga Awan is a 58 y o  female  URI    This is a new problem  Episode onset: 3 days  The problem has been gradually worsening  Maximum temperature: + LGT  Associated symptoms include congestion, coughing, headaches, a sore throat and wheezing  Pertinent negatives include no abdominal pain, chest pain, ear pain, nausea, plugged ear sensation, rash, sinus pain or vomiting  Treatments tried: nyquil and allegra  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    The following portions of the patient's history were reviewed and updated as appropriate: allergies, current medications, past family history, past medical history, past social history, past surgical history and problem list     Review of Systems   Constitutional: Positive for appetite change (decreased) and fatigue  Negative for chills and fever  HENT: Positive for congestion, postnasal drip and sore throat  Negative for ear pain, sinus pain and sinus pressure  Respiratory: Positive for cough, chest tightness and wheezing  Negative for shortness of breath  Cardiovascular: Negative for chest pain and palpitations  Gastrointestinal: Negative for abdominal pain, nausea and vomiting  Skin: Negative for rash  Neurological: Positive for headaches  Negative for dizziness and light-headedness           Past Medical History:   Diagnosis Date    Acquired acanthosis nigricans     Anxiety     Essential hypertension     Hypertension     Prediabetes     [facility]     last assessed: 10/03/2017         Current Outpatient Prescriptions:     amLODIPine (NORVASC) 10 mg tablet, Take 1 tablet (10 mg total) by mouth daily, Disp: 90 tablet, Rfl: 1    ammonium lactate (LAC-HYDRIN) 12 % lotion, daily as needed, Disp: , Rfl: 0    clonazePAM (KlonoPIN) 2 mg tablet, Take 2 mg by mouth as needed, Disp: , Rfl:     eszopiclone (LUNESTA) tablet, Take 3 mg by mouth daily at bedtime as needed, Disp: , Rfl: 0    FINACEA 15 % FOAM, 2 (two) times a day, Disp: , Rfl: 0    rosuvastatin (CRESTOR) 5 mg tablet, Take 1 tablet (5 mg total) by mouth daily, Disp: 90 tablet, Rfl: 3    sitaGLIPtin-metFORMIN (JANUMET)  MG per tablet, Take 1 tablet by mouth 2 (two) times a day, Disp: 180 tablet, Rfl: 1    tazarotene (AVAGE) 0 1 % cream, daily, Disp: , Rfl: 0    triamcinolone (KENALOG) 0 1 % cream, , Disp: , Rfl: 0    valsartan-hydrochlorothiazide (DIOVAN-HCT) 320-25 MG per tablet, Take 1 tablet by mouth daily, Disp: , Rfl:     WELLBUTRIN  MG 24 hr tablet, Take 1 tablet by mouth daily, Disp: , Rfl: 0    Allergies   Allergen Reactions    Escitalopram Rash    Sulfa Antibiotics Hives and Rash    Food Other (See Comments)     Annotation - [id number]: CORN - causes extreme sleepiness       Social History   Past Surgical History:   Procedure Laterality Date    APPENDECTOMY      TOTAL ABDOMINAL HYSTERECTOMY  1989     Family History   Problem Relation Age of Onset    Diabetes Mother     Stroke Mother     No Known Problems Father        Objective:  /88 (BP Location: Right arm, Patient Position: Sitting, Cuff Size: Standard)   Pulse 85   Temp 99 1 °F (37 3 °C) (Oral)   Wt 92 2 kg (203 lb 3 2 oz) Comment: with shoes  SpO2 97%   BMI 32 16 kg/m²      Physical Exam   Constitutional: She is oriented to person, place, and time  She appears well-developed and well-nourished  Non-toxic appearance   She has a sickly appearance  She appears ill  No distress  HENT:   Head: Normocephalic and atraumatic  Right Ear: Hearing, tympanic membrane, external ear and ear canal normal    Left Ear: Hearing, tympanic membrane, external ear and ear canal normal    Nose: Mucosal edema and rhinorrhea present  Right sinus exhibits no maxillary sinus tenderness and no frontal sinus tenderness  Left sinus exhibits no maxillary sinus tenderness and no frontal sinus tenderness  Mouth/Throat: Uvula is midline, oropharynx is clear and moist and mucous membranes are normal    Cardiovascular: Normal rate and regular rhythm  Pulmonary/Chest: Effort normal and breath sounds normal  No respiratory distress  She has no wheezes  Dry cough   Lymphadenopathy:     She has no cervical adenopathy  Neurological: She is alert and oriented to person, place, and time  Skin: Skin is warm and dry  Psychiatric: She has a normal mood and affect  Her behavior is normal  Judgment and thought content normal    Nursing note and vitals reviewed

## 2019-01-02 NOTE — PATIENT INSTRUCTIONS
You have been prescribed an antibiotic today  Take the full course of prescription  Recommend taking with food as may upset your stomach  Also would recommend OTC probiotic or yogurt to help protect the natural rahel of your stomach  Rest   Stay well hydrated  Return for new/worsening symptoms      Use tessalon prn cough

## 2019-01-02 NOTE — LETTER
January 2, 2019     Patient: Haylee Mora   YOB: 1955   Date of Visit: 1/2/2019       To Whom it May Concern:    Kelsey Darnell is under my professional care  She was seen in my office on 1/2/2019  Please excuse patient from work 1/1 through 1/6  She may return to work on 1/7    If you have any questions or concerns, please don't hesitate to call           Sincerely,          EVELIO Sheikh        CC: No Recipients

## 2019-03-04 LAB
ALBUMIN SERPL-MCNC: 4.1 G/DL (ref 3.6–5.1)
ALBUMIN/GLOB SERPL: 1.3 (CALC) (ref 1–2.5)
ALP SERPL-CCNC: 57 U/L (ref 33–130)
ALT SERPL-CCNC: 18 U/L (ref 6–29)
AST SERPL-CCNC: 18 U/L (ref 10–35)
BASOPHILS # BLD AUTO: 48 CELLS/UL (ref 0–200)
BASOPHILS NFR BLD AUTO: 1 %
BILIRUB SERPL-MCNC: 0.5 MG/DL (ref 0.2–1.2)
BUN SERPL-MCNC: 10 MG/DL (ref 7–25)
BUN/CREAT SERPL: ABNORMAL (CALC) (ref 6–22)
CALCIUM SERPL-MCNC: 9 MG/DL (ref 8.6–10.4)
CHLORIDE SERPL-SCNC: 108 MMOL/L (ref 98–110)
CO2 SERPL-SCNC: 26 MMOL/L (ref 20–32)
CREAT SERPL-MCNC: 0.96 MG/DL (ref 0.5–0.99)
EOSINOPHIL # BLD AUTO: 422 CELLS/UL (ref 15–500)
EOSINOPHIL NFR BLD AUTO: 8.8 %
ERYTHROCYTE [DISTWIDTH] IN BLOOD BY AUTOMATED COUNT: 18.7 % (ref 11–15)
EST. AVERAGE GLUCOSE BLD GHB EST-MCNC: 131 (CALC)
EST. AVERAGE GLUCOSE BLD GHB EST-SCNC: 7.3 (CALC)
GLOBULIN SER CALC-MCNC: 3.1 G/DL (CALC) (ref 1.9–3.7)
GLUCOSE SERPL-MCNC: 111 MG/DL (ref 65–99)
HBA1C MFR BLD: 6.2 % OF TOTAL HGB
HCT VFR BLD AUTO: 40.2 % (ref 35–45)
HGB BLD-MCNC: 12.9 G/DL (ref 11.7–15.5)
HIV 1+2 AB+HIV1 P24 AG SERPL QL IA: NORMAL
LYMPHOCYTES # BLD AUTO: 1056 CELLS/UL (ref 850–3900)
LYMPHOCYTES NFR BLD AUTO: 22 %
MCH RBC QN AUTO: 25.2 PG (ref 27–33)
MCHC RBC AUTO-ENTMCNC: 32.1 G/DL (ref 32–36)
MCV RBC AUTO: 78.5 FL (ref 80–100)
MONOCYTES # BLD AUTO: 312 CELLS/UL (ref 200–950)
MONOCYTES NFR BLD AUTO: 6.5 %
NEUTROPHILS # BLD AUTO: 2962 CELLS/UL (ref 1500–7800)
NEUTROPHILS NFR BLD AUTO: 61.7 %
PLATELET # BLD AUTO: 313 THOUSAND/UL (ref 140–400)
PMV BLD REES-ECKER: 10.7 FL (ref 7.5–12.5)
POTASSIUM SERPL-SCNC: 3.7 MMOL/L (ref 3.5–5.3)
PROT SERPL-MCNC: 7.2 G/DL (ref 6.1–8.1)
RBC # BLD AUTO: 5.12 MILLION/UL (ref 3.8–5.1)
SL AMB EGFR AFRICAN AMERICAN: 72 ML/MIN/1.73M2
SL AMB EGFR NON AFRICAN AMERICAN: 62 ML/MIN/1.73M2
SODIUM SERPL-SCNC: 141 MMOL/L (ref 135–146)
WBC # BLD AUTO: 4.8 THOUSAND/UL (ref 3.8–10.8)

## 2019-03-05 ENCOUNTER — OFFICE VISIT (OUTPATIENT)
Dept: INTERNAL MEDICINE CLINIC | Age: 64
End: 2019-03-05
Payer: COMMERCIAL

## 2019-03-05 VITALS
OXYGEN SATURATION: 98 % | HEART RATE: 70 BPM | SYSTOLIC BLOOD PRESSURE: 128 MMHG | DIASTOLIC BLOOD PRESSURE: 90 MMHG | WEIGHT: 210.4 LBS | BODY MASS INDEX: 33.3 KG/M2 | TEMPERATURE: 97.4 F

## 2019-03-05 DIAGNOSIS — D22.9 ATYPICAL NEVI: Primary | ICD-10-CM

## 2019-03-05 DIAGNOSIS — E78.2 MIXED HYPERLIPIDEMIA: ICD-10-CM

## 2019-03-05 DIAGNOSIS — R79.89 ABNORMAL CBC: ICD-10-CM

## 2019-03-05 DIAGNOSIS — I10 ESSENTIAL HYPERTENSION: ICD-10-CM

## 2019-03-05 DIAGNOSIS — Z12.39 SCREENING FOR BREAST CANCER: Primary | ICD-10-CM

## 2019-03-05 DIAGNOSIS — E11.9 TYPE 2 DIABETES MELLITUS WITHOUT COMPLICATION, WITHOUT LONG-TERM CURRENT USE OF INSULIN (HCC): ICD-10-CM

## 2019-03-05 PROCEDURE — 99214 OFFICE O/P EST MOD 30 MIN: CPT | Performed by: INTERNAL MEDICINE

## 2019-03-05 PROCEDURE — 1036F TOBACCO NON-USER: CPT | Performed by: INTERNAL MEDICINE

## 2019-03-05 RX ORDER — VALSARTAN AND HYDROCHLOROTHIAZIDE 320; 25 MG/1; MG/1
1 TABLET, FILM COATED ORAL DAILY
Qty: 90 TABLET | Refills: 0 | Status: SHIPPED | OUTPATIENT
Start: 2019-03-05 | End: 2019-06-11 | Stop reason: SDUPTHER

## 2019-03-05 RX ORDER — ROSUVASTATIN CALCIUM 10 MG/1
TABLET, COATED ORAL
Qty: 30 TABLET | Refills: 1 | Status: SHIPPED | OUTPATIENT
Start: 2019-03-05 | End: 2019-07-05 | Stop reason: SDUPTHER

## 2019-03-05 RX ORDER — EZETIMIBE 10 MG/1
10 TABLET ORAL DAILY
Qty: 30 TABLET | Refills: 2 | Status: SHIPPED | OUTPATIENT
Start: 2019-03-05 | End: 2019-06-05 | Stop reason: SDUPTHER

## 2019-03-05 NOTE — PATIENT INSTRUCTIONS
1  She will have a T68, folic acid, celiac panel, and a stool test to evaluate this abnormal CBC  2  She was referred to Dr Purnima Gardner for further evaluation and assessment of her eyes  3  She was instructed to 10 mg Zetia daily and 10 mg Crestor on Monday, Wednesday, and Friday  4  She will have a lipid panel drawn in 12 weeks  5  She should also complete 150 minutes of exercise weekly  6  She will follow up with us in 3 months or as needed

## 2019-03-05 NOTE — PROGRESS NOTES
Assessment/Plan:    1  Non-insulin dependent DM Type 2  Her HgbA1C was stable at 6 2 on 3/2/19  Her random glucose was 111 on 3/2/19  She is doing well with her sugar levels  She should continue her lifestyle  2  Hypertension  Her BP is stable at 128/90 today at the office  She will continue on her current medications as she is doing well without any significant side effects  3  Abnormal CBC  She has decreased MCV at 78 5, decreased MCH at 25 2, and increased RDW at 18 2 on 3/2/19  She will have a G07, iron level, folic acid, celiac panel, and a stool test to evaluate this abnormal CBC  4  Eye exam  She states that her ophthalmologist did not perform an eye test properly and wanted a referral to someone else  She was referred to Dr Verónica Coffman for further evaluation and assessment  5  Hyperlipidemia  She states she has not been taking her Crestor regularly  She was instructed to 10 mg Zetia daily and 10 mg Crestor on Monday, Wednesday, and Friday  She will have a lipid panel drawn in 12 weeks  She should also complete 150 minutes of exercise weekly  6  Healthcare Maintenance  She will follow up with us in 3 months or as needed  Diagnoses and all orders for this visit:    Screening for breast cancer  -     Mammo screening bilateral w cad; Future    Essential hypertension  -     valsartan-hydrochlorothiazide (DIOVAN-HCT) 320-25 MG per tablet; Take 1 tablet by mouth daily    Mixed hyperlipidemia  -     rosuvastatin (CRESTOR) 10 MG tablet; Take 1 tablet (10 mg total) by mouth on Monday, Wednesday, and Friday  -     ezetimibe (ZETIA) 10 mg tablet; Take 1 tablet (10 mg total) by mouth daily    Type 2 diabetes mellitus without complication, without long-term current use of insulin ([facility] )  -     Ambulatory referral to Ophthalmology; Future    Abnormal CBC  -     Methylmalonic acid, serum; Future  -     Folate; Future  -     Vitamin B12; Future  -     Celiac Disease Panel;  Future  -     Fecal Globin By Immunochemistry; Future  -     Ferritin; Future          Subjective:      Patient ID: Meryle Mass is a 58 y o  female  Sujey Garvey is a 58year old female who presents today for a 4 month follow up regarding her diabetes mellitus, hypertension, and hyperlipidemia  She has no significant acute complaints and is doing well overall  She reports her hives have improved after she saw an allergist and was instructed to take Claritin  She notes these were related to stress  She states she had some depression after she broke up with her boyfriend, but she is feeling much better now  She has a lot of stress in her life due to her work and her daughter  She sees a therapist weekly  She states she is chronically fatigued  She states that her ophthalmologist did not perform an eye test properly and wanted a referral to someone else  She denies any chills, diaphoresis, fatigue, fever, ocular problems, respiratory problems, CV problems, urinary problems, neurological problems, and adenopathies  The following portions of the patient's history were reviewed and updated as appropriate: allergies, current medications, past family history, past medical history, past social history, past surgical history and problem list     Review of Systems   Constitutional: Positive for unexpected weight change (210)  Negative for chills, diaphoresis, fatigue and fever  HENT: Negative for congestion, ear discharge, ear pain, facial swelling, hearing loss, mouth sores, nosebleeds, postnasal drip, rhinorrhea, sinus pressure, sinus pain, sneezing and sore throat  Eyes: Negative for photophobia, pain, discharge, redness and itching  Respiratory: Negative for cough, chest tightness, shortness of breath, wheezing and stridor  Cardiovascular: Negative for chest pain, palpitations and leg swelling  Gastrointestinal: Positive for constipation, diarrhea and nausea   Negative for abdominal distention, anal bleeding and blood in stool  Endocrine: Negative for cold intolerance, heat intolerance, polydipsia, polyphagia and polyuria  Genitourinary: Negative for decreased urine volume, difficulty urinating, dysuria, flank pain, frequency and hematuria  Neurological: Negative for dizziness, tremors, seizures, syncope, speech difficulty, weakness, light-headedness, numbness and headaches  Hematological: Negative for adenopathy  Does not bruise/bleed easily  Psychiatric/Behavioral: Positive for dysphoric mood  Negative for agitation, behavioral problems, confusion, decreased concentration, hallucinations and self-injury  The patient is not nervous/anxious and is not hyperactive  Objective:      /90 (BP Location: Left arm, Patient Position: Sitting, Cuff Size: Standard)   Pulse 70   Temp (!) 97 4 °F (36 3 °C) (Tympanic)   Wt 95 4 kg (210 lb 6 4 oz)   SpO2 98%   BMI 33 30 kg/m²          Physical Exam   Constitutional: She is oriented to person, place, and time  She appears well-developed and well-nourished  No distress  HENT:   Head: Normocephalic and atraumatic  Nose: Nose normal    Mouth/Throat: No oropharyngeal exudate  Eyes: Conjunctivae and EOM are normal  Right eye exhibits no discharge  Left eye exhibits no discharge  No scleral icterus  Neck: Normal range of motion  Neck supple  No JVD present  No tracheal deviation present  Cardiovascular: Normal rate, regular rhythm and normal heart sounds  Exam reveals no gallop  No murmur heard  Pulmonary/Chest: Effort normal and breath sounds normal    Abdominal: Soft  Bowel sounds are normal  She exhibits no distension and no mass  There is no tenderness  There is no rebound  Musculoskeletal: Normal range of motion  She exhibits no edema, tenderness or deformity  Neurological: She is alert and oriented to person, place, and time  No cranial nerve deficit  Skin: She is not diaphoretic  Psychiatric: She has a normal mood and affect   Her behavior is normal  Judgment and thought content normal          Scribe Signature and Attestation:  By signing my name below, Brant Pressley attest that this documentation has been prepared under the direction and in the presence of Dr Nita Henriquez MD  Electronically signed: Cecil Smith  3/5/2019    Provider Attestation:  I, Dr Nita Henriquez, personally performed the services described in this documentation  All medical record entries made by the scribe were at my direction and in my presence  I have reviewed the chart and discharge instructions (if applicable) and agree that the record reflects my personal performance and is accurate and complete  Dr Nita Henriquez MD  3/5/2019

## 2019-03-25 ENCOUNTER — TELEPHONE (OUTPATIENT)
Dept: INTERNAL MEDICINE CLINIC | Facility: CLINIC | Age: 64
End: 2019-03-25

## 2019-03-25 NOTE — TELEPHONE ENCOUNTER
Abhinav Ornelas called this morning stating that she needed a referral for an eye appt that she has set up for this Thursday 3/28/19 @ 1PM  She is going to [facility] in [place]  Their phone number is [phone]   LYR:[id number]

## 2019-04-02 DIAGNOSIS — Z12.31 ENCOUNTER FOR SCREENING MAMMOGRAM FOR MALIGNANT NEOPLASM OF BREAST: ICD-10-CM

## 2019-04-18 ENCOUNTER — TELEPHONE (OUTPATIENT)
Dept: INTERNAL MEDICINE CLINIC | Age: 64
End: 2019-04-18

## 2019-06-05 DIAGNOSIS — E78.2 MIXED HYPERLIPIDEMIA: ICD-10-CM

## 2019-06-05 RX ORDER — EZETIMIBE 10 MG/1
TABLET ORAL
Qty: 30 TABLET | Refills: 2 | Status: SHIPPED | OUTPATIENT
Start: 2019-06-05 | End: 2020-06-25 | Stop reason: SDUPTHER

## 2019-06-11 ENCOUNTER — OFFICE VISIT (OUTPATIENT)
Dept: INTERNAL MEDICINE CLINIC | Age: 64
End: 2019-06-11
Payer: COMMERCIAL

## 2019-06-11 VITALS
TEMPERATURE: 98.9 F | DIASTOLIC BLOOD PRESSURE: 88 MMHG | SYSTOLIC BLOOD PRESSURE: 124 MMHG | HEART RATE: 82 BPM | BODY MASS INDEX: 33.68 KG/M2 | OXYGEN SATURATION: 98 % | WEIGHT: 212.8 LBS

## 2019-06-11 DIAGNOSIS — Z11.59 NEED FOR HEPATITIS C SCREENING TEST: ICD-10-CM

## 2019-06-11 DIAGNOSIS — E11.9 TYPE 2 DIABETES MELLITUS WITHOUT COMPLICATION, WITHOUT LONG-TERM CURRENT USE OF INSULIN (HCC): ICD-10-CM

## 2019-06-11 DIAGNOSIS — Z12.39 SCREENING FOR MALIGNANT NEOPLASM OF BREAST: Primary | ICD-10-CM

## 2019-06-11 DIAGNOSIS — I10 ESSENTIAL HYPERTENSION: ICD-10-CM

## 2019-06-11 PROCEDURE — 99213 OFFICE O/P EST LOW 20 MIN: CPT | Performed by: INTERNAL MEDICINE

## 2019-06-11 PROCEDURE — 1036F TOBACCO NON-USER: CPT | Performed by: INTERNAL MEDICINE

## 2019-06-11 PROCEDURE — 3074F SYST BP LT 130 MM HG: CPT | Performed by: INTERNAL MEDICINE

## 2019-06-11 PROCEDURE — 3079F DIAST BP 80-89 MM HG: CPT | Performed by: INTERNAL MEDICINE

## 2019-06-11 RX ORDER — VALSARTAN AND HYDROCHLOROTHIAZIDE 320; 25 MG/1; MG/1
1 TABLET, FILM COATED ORAL DAILY
Qty: 90 TABLET | Refills: 0 | Status: SHIPPED | OUTPATIENT
Start: 2019-06-11 | End: 2019-11-26 | Stop reason: SDUPTHER

## 2019-06-11 RX ORDER — FERROUS FUMARATE AND POLYSACCHRIDE IRON VITAMIN MINERAL COMPLEX SUPPLEMENT 191.2; 135.9; 1; 210; 20; 5; 5; 7; 25; 3 MG/1; MG/1; MG/1; MG/1; MG/1; MG/1; MG/1; MG/1; MG/1; UG/1
1 CAPSULE ORAL DAILY
Refills: 0 | COMMUNITY
Start: 2019-05-24

## 2019-06-11 RX ORDER — HYDROQUINONE 40 MG/G
CREAM TOPICAL AS NEEDED
Refills: 0 | COMMUNITY
Start: 2019-03-11 | End: 2022-03-17

## 2019-06-11 RX ORDER — AMLODIPINE BESYLATE 10 MG/1
10 TABLET ORAL DAILY
Qty: 90 TABLET | Refills: 1 | Status: SHIPPED | OUTPATIENT
Start: 2019-06-11 | End: 2019-08-20 | Stop reason: SDUPTHER

## 2019-06-13 LAB
FERRITIN SERPL-MCNC: 24 NG/ML (ref 20–288)
FOLATE SERPL-MCNC: 21.6 NG/ML
IGA SERPL-MCNC: 244 MG/DL (ref 81–463)
METHYLMALONATE SERPL-SCNC: 94 NMOL/L (ref 87–318)
TTG IGA SER-ACNC: 1 U/ML
VIT B12 SERPL-MCNC: 738 PG/ML (ref 200–1100)

## 2019-07-05 DIAGNOSIS — E78.2 MIXED HYPERLIPIDEMIA: ICD-10-CM

## 2019-07-06 RX ORDER — ROSUVASTATIN CALCIUM 10 MG/1
TABLET, COATED ORAL
Qty: 30 TABLET | Refills: 1 | Status: SHIPPED | OUTPATIENT
Start: 2019-07-06 | End: 2019-11-04 | Stop reason: SDUPTHER

## 2019-08-20 ENCOUNTER — OFFICE VISIT (OUTPATIENT)
Dept: INTERNAL MEDICINE CLINIC | Age: 64
End: 2019-08-20
Payer: COMMERCIAL

## 2019-08-20 VITALS
HEART RATE: 82 BPM | WEIGHT: 207.4 LBS | DIASTOLIC BLOOD PRESSURE: 80 MMHG | TEMPERATURE: 97.7 F | BODY MASS INDEX: 31.43 KG/M2 | SYSTOLIC BLOOD PRESSURE: 112 MMHG | OXYGEN SATURATION: 98 % | HEIGHT: 68 IN

## 2019-08-20 DIAGNOSIS — E66.09 CLASS 1 OBESITY DUE TO EXCESS CALORIES WITHOUT SERIOUS COMORBIDITY WITH BODY MASS INDEX (BMI) OF 31.0 TO 31.9 IN ADULT: ICD-10-CM

## 2019-08-20 DIAGNOSIS — I10 ESSENTIAL HYPERTENSION: ICD-10-CM

## 2019-08-20 DIAGNOSIS — L85.3 DRY SKIN DERMATITIS: Primary | ICD-10-CM

## 2019-08-20 DIAGNOSIS — E78.2 MIXED HYPERLIPIDEMIA: ICD-10-CM

## 2019-08-20 DIAGNOSIS — E13.9 DIABETES 1.5, MANAGED AS TYPE 2 (HCC): ICD-10-CM

## 2019-08-20 DIAGNOSIS — Z00.00 PREVENTATIVE HEALTH CARE: ICD-10-CM

## 2019-08-20 DIAGNOSIS — F32.A DEPRESSION, UNSPECIFIED DEPRESSION TYPE: ICD-10-CM

## 2019-08-20 PROCEDURE — 99214 OFFICE O/P EST MOD 30 MIN: CPT | Performed by: INTERNAL MEDICINE

## 2019-08-20 PROCEDURE — 3074F SYST BP LT 130 MM HG: CPT | Performed by: INTERNAL MEDICINE

## 2019-08-20 PROCEDURE — 3008F BODY MASS INDEX DOCD: CPT | Performed by: INTERNAL MEDICINE

## 2019-08-20 PROCEDURE — 1036F TOBACCO NON-USER: CPT | Performed by: INTERNAL MEDICINE

## 2019-08-20 RX ORDER — AZELAIC ACID 0.15 G/G
GEL TOPICAL
Refills: 0 | COMMUNITY
Start: 2019-08-16 | End: 2019-11-26

## 2019-08-20 RX ORDER — ROSUVASTATIN CALCIUM 10 MG/1
TABLET, COATED ORAL
Qty: 30 TABLET | Refills: 1 | Status: CANCELLED | OUTPATIENT
Start: 2019-08-20

## 2019-08-20 RX ORDER — AMMONIUM LACTATE 12 G/100G
LOTION TOPICAL DAILY PRN
Qty: 400 G | Refills: 0 | Status: SHIPPED | OUTPATIENT
Start: 2019-08-20 | End: 2019-11-26 | Stop reason: SDUPTHER

## 2019-08-20 RX ORDER — AMLODIPINE BESYLATE 10 MG/1
10 TABLET ORAL DAILY
Qty: 90 TABLET | Refills: 1 | Status: SHIPPED | OUTPATIENT
Start: 2019-08-20 | End: 2019-11-26 | Stop reason: SDUPTHER

## 2019-08-20 NOTE — PROGRESS NOTES
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  Type II Diabetes- I advised pt to continue diabetic diet with low sugar intake  And to continue on metformin, I will check CMP and A1C    Hyperlipidemia-  Pt is on Crestor 3x a week, and zetia daily  I advised pt to stay on low cholesterol low fat diet and to check lipid panel for next visit  Subjective:      Patient ID: Dia Monroy is a 58 y o   [place] female here for a 2 month follow up  Pt has a history of depression, stress, and low iron    HPI      Problem List Items Addressed This Visit     None      Visit Diagnoses     Mixed hyperlipidemia        Essential hypertension                  The following portions of the patient's history were reviewed and updated as appropriate: allergies, current medications, past family history, past medical history, past social history, past surgical history and problem list     Review of Systems      Objective:      /80 (BP Location: Left arm, Patient Position: Sitting, Cuff Size: Adult)   Pulse 82   Temp 97 7 °F (36 5 °C) (Tympanic)   Ht 5' 7 76" (1 721 m)   Wt 94 1 kg (207 lb 6 4 oz)   SpO2 98%   BMI 31 76 kg/m²          Physical Exam   Constitutional: She is oriented to person, place, and time  She appears well-developed and well-nourished  No distress  HENT:   Head: Normocephalic and atraumatic  Mouth/Throat: No oropharyngeal exudate  Eyes: EOM are normal  Right eye exhibits no discharge  Left eye exhibits no discharge   No scleral icterus  Neck: Neck supple  No thyromegaly present  Cardiovascular: Normal rate, regular rhythm and normal heart sounds  Exam reveals no gallop  No murmur heard  Pulmonary/Chest: Effort normal and breath sounds normal  No respiratory distress  She has no wheezes  She has no rales  Abdominal: Soft  Bowel sounds are normal  She exhibits no distension and no mass  There is no tenderness  There is no rebound  Musculoskeletal: Normal range of motion  She exhibits no edema, tenderness or deformity  Neurological: She is alert and oriented to person, place, and time  She displays normal reflexes  No cranial nerve deficit  Coordination normal    Skin: She is not diaphoretic  Facial pigmentation         Attestation:   By signing my name below, Kenia Pandey, attest that this documentation has been prepared under the direction and in the presence of Derrick Aviles MD  Electronically Signed: Cecil López  8/20/19      I, Derrick Aviles, personally performed the services described in this documentation  All medical record entries made by the scribe were at my direction and in my presence  I have reviewed the chart and discharge instructions and agree that the record reflects my personal performance and is accurate and complete   Derrick Aviles MD  8/20/19

## 2019-08-20 NOTE — PATIENT INSTRUCTIONS
1  Complete stool tests  2  Lower BMI to avoid white starches, concentrated sweets, and low proportions  3  Get Phenummocal vaccine, Hepatitis C screening, and Hepatitis B vaccines, and Hemoglobin A1C  4  Continue taking iron supplements   6  Complete lab work  7  Referral for mammogram   8   Follow up in 3 months

## 2019-11-04 DIAGNOSIS — E78.2 MIXED HYPERLIPIDEMIA: ICD-10-CM

## 2019-11-05 RX ORDER — ROSUVASTATIN CALCIUM 10 MG/1
TABLET, COATED ORAL
Qty: 30 TABLET | Refills: 1 | Status: SHIPPED | OUTPATIENT
Start: 2019-11-05 | End: 2020-03-30

## 2019-11-26 ENCOUNTER — OFFICE VISIT (OUTPATIENT)
Dept: INTERNAL MEDICINE CLINIC | Age: 64
End: 2019-11-26
Payer: COMMERCIAL

## 2019-11-26 VITALS
OXYGEN SATURATION: 96 % | HEART RATE: 85 BPM | BODY MASS INDEX: 32.52 KG/M2 | TEMPERATURE: 99.2 F | DIASTOLIC BLOOD PRESSURE: 70 MMHG | SYSTOLIC BLOOD PRESSURE: 110 MMHG | WEIGHT: 207.2 LBS | HEIGHT: 67 IN

## 2019-11-26 DIAGNOSIS — L85.3 DRY SKIN DERMATITIS: ICD-10-CM

## 2019-11-26 DIAGNOSIS — E11.9 TYPE 2 DIABETES MELLITUS WITHOUT COMPLICATION, WITHOUT LONG-TERM CURRENT USE OF INSULIN (HCC): Primary | ICD-10-CM

## 2019-11-26 DIAGNOSIS — E78.5 HYPERLIPIDEMIA, UNSPECIFIED HYPERLIPIDEMIA TYPE: ICD-10-CM

## 2019-11-26 DIAGNOSIS — M85.80 OSTEOPENIA, UNSPECIFIED LOCATION: ICD-10-CM

## 2019-11-26 DIAGNOSIS — D50.9 IRON DEFICIENCY ANEMIA, UNSPECIFIED IRON DEFICIENCY ANEMIA TYPE: ICD-10-CM

## 2019-11-26 DIAGNOSIS — E78.2 MIXED HYPERLIPIDEMIA: ICD-10-CM

## 2019-11-26 DIAGNOSIS — D64.9 ANEMIA, UNSPECIFIED TYPE: ICD-10-CM

## 2019-11-26 DIAGNOSIS — Z23 NEED FOR TDAP VACCINATION: ICD-10-CM

## 2019-11-26 DIAGNOSIS — I10 ESSENTIAL HYPERTENSION: ICD-10-CM

## 2019-11-26 PROCEDURE — 90471 IMMUNIZATION ADMIN: CPT

## 2019-11-26 PROCEDURE — 99213 OFFICE O/P EST LOW 20 MIN: CPT | Performed by: INTERNAL MEDICINE

## 2019-11-26 PROCEDURE — 1036F TOBACCO NON-USER: CPT | Performed by: INTERNAL MEDICINE

## 2019-11-26 PROCEDURE — 1101F PT FALLS ASSESS-DOCD LE1/YR: CPT | Performed by: INTERNAL MEDICINE

## 2019-11-26 PROCEDURE — 90715 TDAP VACCINE 7 YRS/> IM: CPT

## 2019-11-26 RX ORDER — AMLODIPINE BESYLATE 10 MG/1
10 TABLET ORAL DAILY
Qty: 90 TABLET | Refills: 1 | Status: SHIPPED | OUTPATIENT
Start: 2019-11-26 | End: 2020-06-25 | Stop reason: SDUPTHER

## 2019-11-26 RX ORDER — VALSARTAN AND HYDROCHLOROTHIAZIDE 320; 25 MG/1; MG/1
1 TABLET, FILM COATED ORAL DAILY
Qty: 90 TABLET | Refills: 1 | Status: SHIPPED | OUTPATIENT
Start: 2019-11-26 | End: 2020-03-08

## 2019-11-26 RX ORDER — AMMONIUM LACTATE 12 G/100G
LOTION TOPICAL DAILY PRN
Qty: 400 G | Refills: 0 | Status: SHIPPED | OUTPATIENT
Start: 2019-11-26 | End: 2022-03-17 | Stop reason: SDUPTHER

## 2019-11-26 NOTE — PATIENT INSTRUCTIONS
1  Receive Tdap in the office today  2  Complete blood work as soon as you can  3  Complete DEXA Scan  4  Continue minimizing processed foods and increase natural foods in diet  5  Minimize white starches, carbohydrates, and concentration sugars from diet  6  Eat smaller portions and decrease caloric intake  7  Continue to exercise  8  Follow up in 4 months or as needed

## 2019-11-26 NOTE — PROGRESS NOTES
Chief Complaint   Patient presents with    Follow-up     didnt have labs completed        Assessment/Plan:    Depression - Pt states she is reducing stress  She goes to the gym three times a day with a   Pt also regularly sees a psychologist      BMI - Pts BMI in the office today was 32 94  She was recommended the following: Continue minimizing processed foods and increase natural foods in diet  Minimize white starches, carbohydrates, and concentration sugars from diet  Eat smaller portions and decrease caloric intake  Continue to exercise  Type II DM - Pt states her sugar levels have been low, 100 to 130 ranges  She will need to complete blood work, Hemoglobin A1C, hepatic function panel, and CMP  She was advised to continue to stay on a no concentrated sweets and white starches  Hyperlipidemia - Pt will need to complete lipid panel for next visit  Iron deficiency -She will need to recheck her ferritin level  Osteopenia - Pt will complete a DEXA scan  Health Maintenance - Pt refused the flu shot  She will receive the Tdap in the office today  Pt is trying Kegel exercises for urinary incontinence  Pt is also due for the pneumococcal vaccine which she will receive next visit or at drug store  Follow up in 4 months or as needed  BMI Counseling: Body mass index is 32 94 kg/m²  The BMI is above normal  Nutrition recommendations include decreasing portion sizes, encouraging healthy choices of fruits and vegetables, decreasing fast food intake, consuming healthier snacks, limiting drinks that contain sugar and reducing intake of cholesterol  Exercise recommendations include exercising 3-5 times per week  No pharmacotherapy was ordered  Diagnoses and all orders for this visit:    Dry skin dermatitis  -     ammonium lactate (LAC-HYDRIN) 12 % lotion; Apply topically daily as needed for dry skin    Essential hypertension  -     amLODIPine (NORVASC) 10 mg tablet;  Take 1 tablet (10 mg total) by mouth daily  -     valsartan-hydrochlorothiazide (DIOVAN-HCT) 320-25 MG per tablet; Take 1 tablet by mouth daily          Subjective:      Patient ID: Ilia Jay is a 71 y o  female  This is the case of a 71year old female presenting in the office today with no significant complaints  She states she is doing Kegel exercises for urinary incontinence as it is not severe  She states she goes to the gym with a  once a week  The following portions of the patient's history were reviewed and updated as appropriate: allergies, current medications, past family history, past medical history, past social history, past surgical history and problem list     Review of Systems   Constitutional: Negative for appetite change, chills, diaphoresis, fatigue, fever and unexpected weight change  HENT: Positive for postnasal drip, rhinorrhea, sinus pressure and sore throat  Negative for congestion, ear pain, facial swelling, hearing loss, mouth sores, nosebleeds, tinnitus and trouble swallowing  Eyes: Negative  Respiratory: Negative  Cardiovascular: Negative  Gastrointestinal: Negative  Genitourinary: Negative for difficulty urinating, frequency, hematuria and urgency  Musculoskeletal: Negative  Neurological: Negative for dizziness, seizures, facial asymmetry, speech difficulty, light-headedness, numbness and headaches  Hematological: Negative for adenopathy  Psychiatric/Behavioral: Negative          Allergies   Allergen Reactions    Escitalopram Rash    Sulfa Antibiotics Hives and Rash    Food Other (See Comments)     Annotation - [id number]: CORN - causes extreme sleepiness     Past Medical History:   Diagnosis Date    Acquired acanthosis nigricans     Anxiety     Essential hypertension     Hypertension     Prediabetes     Type 2 diabetes mellitus without complication [facility]     last assessed: 10/03/2017     Current Outpatient Medications on File Prior to Visit Medication Sig Dispense Refill    clonazePAM (KlonoPIN) 2 mg tablet Take 2 mg by mouth as needed      eszopiclone (LUNESTA) tablet Take 3 mg by mouth daily at bedtime as needed  0    ezetimibe (ZETIA) 10 mg tablet take 1 tablet by mouth once daily 30 tablet 2    WyVdw-BnOhhy-KT-B Cmp-C-Biot (INTEGRA PLUS) CAPS Take 1 tablet by mouth daily  0    FINACEA 15 % FOAM 2 (two) times a day  0    hydroquinone 4 % cream as needed  0    rosuvastatin (CRESTOR) 10 MG tablet take 1 tablet by mouth EVERY MONDAY, WEDNESDAY AND FRIDAY 30 tablet 1    sitaGLIPtin-metFORMIN (JANUMET)  MG per tablet Take 1 tablet by mouth 2 (two) times a day 180 tablet 1    tazarotene (AVAGE) 0 1 % cream Apply topically daily   0    WELLBUTRIN  MG 24 hr tablet Take 1 tablet by mouth daily  0    [DISCONTINUED] amLODIPine (NORVASC) 10 mg tablet Take 1 tablet (10 mg total) by mouth daily 90 tablet 1    [DISCONTINUED] ammonium lactate (LAC-HYDRIN) 12 % lotion Apply topically daily as needed for dry skin 400 g 0    [DISCONTINUED] valsartan-hydrochlorothiazide (DIOVAN-HCT) 320-25 MG per tablet Take 1 tablet by mouth daily 90 tablet 0    [DISCONTINUED] Azelaic Acid 15 % cream   0    [DISCONTINUED] triamcinolone (KENALOG) 0 1 % cream   0     No current facility-administered medications on file prior to visit        Past Surgical History:   Procedure Laterality Date    APPENDECTOMY      TOTAL ABDOMINAL HYSTERECTOMY  1989     Social History     Socioeconomic History    Marital status:      Spouse name: Not on file    Number of children: Not on file    Years of education: Not on file    Highest education level: Not on file   Occupational History    Not on file   Social Needs    Financial resource strain: Not on file    Food insecurity:     Worry: Not on file     Inability: Not on file    Transportation needs:     Medical: Not on file     Non-medical: Not on file   Tobacco Use    Smoking status: Never Smoker    Smokeless tobacco: Never Used   Substance and Sexual Activity    Alcohol use: No    Drug use: No    Sexual activity: Yes   Lifestyle    Physical activity:     Days per week: Not on file     Minutes per session: Not on file    Stress: Not on file   Relationships    Social connections:     Talks on phone: Not on file     Gets together: Not on file     Attends Sikhism service: Not on file     Active member of club or organization: Not on file     Attends meetings of clubs or organizations: Not on file     Relationship status: Not on file    Intimate partner violence:     Fear of current or ex partner: Not on file     Emotionally abused: Not on file     Physically abused: Not on file     Forced sexual activity: Not on file   Other Topics Concern    Not on file   Social History Narrative    Not on file         Objective:      /70 (BP Location: Left arm, Patient Position: Sitting, Cuff Size: Large)   Pulse 85   Temp 99 2 °F (37 3 °C) (Tympanic)   Ht 5' 6 5" (1 689 m)   Wt 94 kg (207 lb 3 2 oz)   SpO2 96%   BMI 32 94 kg/m²          Physical Exam   Constitutional: She is oriented to person, place, and time  She appears well-developed and well-nourished  No distress  HENT:   Head: Normocephalic and atraumatic  Eyes: Pupils are equal, round, and reactive to light  EOM are normal    Neck: Normal range of motion  Neck supple  No thyromegaly present  Cardiovascular: Normal rate and regular rhythm  Exam reveals no gallop  No murmur heard  Pulmonary/Chest: Effort normal and breath sounds normal  No respiratory distress  She has no wheezes  She exhibits no tenderness  Abdominal: Soft  Bowel sounds are normal  She exhibits no distension and no mass  There is no tenderness  No hernia  Neurological: She is alert and oriented to person, place, and time  No cranial nerve deficit  Coordination normal    Skin: Skin is warm and dry  She is not diaphoretic  Psychiatric: She has a normal mood and affect  Her behavior is normal  Judgment and thought content normal    Nursing note and vitals reviewed  Attestation:   By signing my name below, Gideonrehan Eisenmenger, attest that this documentation has been prepared under the direction and in the presence of Ric Richardson MD  Electronically Signed: Ketty Dodson, [address] [place] [address]  11/26/19      I, Ric Richardson, personally performed the services described in this documentation  All medical record entries made by the scribe were at my direction and in my presence  I have reviewed the chart and discharge instructions and agree that the record reflects my personal performance and is accurate and complete   Ric Richardson MD  11/26/19

## 2019-12-03 LAB
ALBUMIN SERPL-MCNC: 4.4 G/DL (ref 3.6–5.1)
ALBUMIN SERPL-MCNC: 4.4 G/DL (ref 3.6–5.1)
ALBUMIN/GLOB SERPL: 1.5 (CALC) (ref 1–2.5)
ALBUMIN/GLOB SERPL: 1.5 (CALC) (ref 1–2.5)
ALP SERPL-CCNC: 55 U/L (ref 33–130)
ALP SERPL-CCNC: 55 U/L (ref 33–130)
ALT SERPL-CCNC: 25 U/L (ref 6–29)
ALT SERPL-CCNC: 25 U/L (ref 6–29)
AST SERPL-CCNC: 23 U/L (ref 10–35)
AST SERPL-CCNC: 23 U/L (ref 10–35)
BASOPHILS # BLD AUTO: 41 CELLS/UL (ref 0–200)
BASOPHILS NFR BLD AUTO: 0.7 %
BILIRUB DIRECT SERPL-MCNC: 0.1 MG/DL
BILIRUB INDIRECT SERPL-MCNC: 0.4 MG/DL (CALC) (ref 0.2–1.2)
BILIRUB SERPL-MCNC: 0.5 MG/DL (ref 0.2–1.2)
BILIRUB SERPL-MCNC: 0.5 MG/DL (ref 0.2–1.2)
BUN SERPL-MCNC: 11 MG/DL (ref 7–25)
BUN/CREAT SERPL: ABNORMAL (CALC) (ref 6–22)
CALCIUM SERPL-MCNC: 10 MG/DL (ref 8.6–10.4)
CHLORIDE SERPL-SCNC: 105 MMOL/L (ref 98–110)
CHOLEST SERPL-MCNC: 236 MG/DL
CHOLEST/HDLC SERPL: 3.5 (CALC)
CO2 SERPL-SCNC: 30 MMOL/L (ref 20–32)
CREAT SERPL-MCNC: 0.88 MG/DL (ref 0.5–0.99)
EOSINOPHIL # BLD AUTO: 383 CELLS/UL (ref 15–500)
EOSINOPHIL NFR BLD AUTO: 6.6 %
ERYTHROCYTE [DISTWIDTH] IN BLOOD BY AUTOMATED COUNT: 13.6 % (ref 11–15)
FERRITIN SERPL-MCNC: 48 NG/ML (ref 16–288)
GLOBULIN SER CALC-MCNC: 3 G/DL (CALC) (ref 1.9–3.7)
GLOBULIN SER CALC-MCNC: 3 G/DL (CALC) (ref 1.9–3.7)
GLUCOSE SERPL-MCNC: 107 MG/DL (ref 65–99)
HBA1C MFR BLD: 6.3 % OF TOTAL HGB
HBV SURFACE AB SER QL IA: REACTIVE
HCT VFR BLD AUTO: 45.5 % (ref 35–45)
HDLC SERPL-MCNC: 68 MG/DL
HGB BLD-MCNC: 15.4 G/DL (ref 11.7–15.5)
LDLC SERPL CALC-MCNC: 149 MG/DL (CALC)
LYMPHOCYTES # BLD AUTO: 1444 CELLS/UL (ref 850–3900)
LYMPHOCYTES NFR BLD AUTO: 24.9 %
MCH RBC QN AUTO: 29.3 PG (ref 27–33)
MCHC RBC AUTO-ENTMCNC: 33.8 G/DL (ref 32–36)
MCV RBC AUTO: 86.7 FL (ref 80–100)
MONOCYTES # BLD AUTO: 342 CELLS/UL (ref 200–950)
MONOCYTES NFR BLD AUTO: 5.9 %
NEUTROPHILS # BLD AUTO: 3590 CELLS/UL (ref 1500–7800)
NEUTROPHILS NFR BLD AUTO: 61.9 %
NONHDLC SERPL-MCNC: 168 MG/DL (CALC)
PLATELET # BLD AUTO: 311 THOUSAND/UL (ref 140–400)
PMV BLD REES-ECKER: 10.7 FL (ref 7.5–12.5)
POTASSIUM SERPL-SCNC: 4.2 MMOL/L (ref 3.5–5.3)
PROT SERPL-MCNC: 7.4 G/DL (ref 6.1–8.1)
PROT SERPL-MCNC: 7.4 G/DL (ref 6.1–8.1)
RBC # BLD AUTO: 5.25 MILLION/UL (ref 3.8–5.1)
SL AMB EGFR AFRICAN AMERICAN: 80 ML/MIN/1.73M2
SL AMB EGFR NON AFRICAN AMERICAN: 69 ML/MIN/1.73M2
SODIUM SERPL-SCNC: 142 MMOL/L (ref 135–146)
TRIGL SERPL-MCNC: 83 MG/DL
WBC # BLD AUTO: 5.8 THOUSAND/UL (ref 3.8–10.8)

## 2019-12-04 ENCOUNTER — TELEPHONE (OUTPATIENT)
Dept: INTERNAL MEDICINE CLINIC | Age: 64
End: 2019-12-04

## 2019-12-04 NOTE — TELEPHONE ENCOUNTER
Reviewed results of lab test and emphasized that her cholesterol and LDLs were elevated  She should continue on her medication as prescribed and focus on her diet

## 2020-03-05 DIAGNOSIS — I10 ESSENTIAL HYPERTENSION: ICD-10-CM

## 2020-03-08 RX ORDER — VALSARTAN AND HYDROCHLOROTHIAZIDE 320; 25 MG/1; MG/1
TABLET, FILM COATED ORAL
Qty: 90 TABLET | Refills: 1 | Status: SHIPPED | OUTPATIENT
Start: 2020-03-08 | End: 2020-05-31

## 2020-03-11 ENCOUNTER — HOSPITAL ENCOUNTER (OUTPATIENT)
Dept: MAMMOGRAPHY | Facility: HOSPITAL | Age: 65
Discharge: HOME | End: 2020-03-11
Payer: COMMERCIAL

## 2020-03-11 VITALS — WEIGHT: 207 LBS | BODY MASS INDEX: 32.49 KG/M2 | HEIGHT: 67 IN

## 2020-03-11 DIAGNOSIS — Z12.39 SCREENING FOR MALIGNANT NEOPLASM OF BREAST: ICD-10-CM

## 2020-03-11 PROCEDURE — 77067 SCR MAMMO BI INCL CAD: CPT

## 2020-03-30 DIAGNOSIS — E78.2 MIXED HYPERLIPIDEMIA: ICD-10-CM

## 2020-03-30 RX ORDER — ROSUVASTATIN CALCIUM 10 MG/1
TABLET, COATED ORAL
Qty: 30 TABLET | Refills: 1 | Status: SHIPPED | OUTPATIENT
Start: 2020-03-30 | End: 2021-03-04 | Stop reason: SDUPTHER

## 2020-04-08 DIAGNOSIS — E11.9 TYPE 2 DIABETES MELLITUS WITHOUT COMPLICATION, WITHOUT LONG-TERM CURRENT USE OF INSULIN (HCC): ICD-10-CM

## 2020-04-15 ENCOUNTER — TELEPHONE (OUTPATIENT)
Dept: INTERNAL MEDICINE CLINIC | Age: 65
End: 2020-04-15

## 2020-05-29 DIAGNOSIS — I10 ESSENTIAL HYPERTENSION: ICD-10-CM

## 2020-05-31 RX ORDER — VALSARTAN AND HYDROCHLOROTHIAZIDE 320; 25 MG/1; MG/1
TABLET, FILM COATED ORAL
Qty: 90 TABLET | Refills: 1 | Status: SHIPPED | OUTPATIENT
Start: 2020-05-31 | End: 2020-06-25

## 2020-06-23 ENCOUNTER — TELEPHONE (OUTPATIENT)
Dept: INTERNAL MEDICINE CLINIC | Facility: CLINIC | Age: 65
End: 2020-06-23

## 2020-06-25 ENCOUNTER — OFFICE VISIT (OUTPATIENT)
Dept: INTERNAL MEDICINE CLINIC | Age: 65
End: 2020-06-25
Payer: COMMERCIAL

## 2020-06-25 VITALS
HEIGHT: 67 IN | OXYGEN SATURATION: 97 % | BODY MASS INDEX: 34.21 KG/M2 | HEART RATE: 78 BPM | DIASTOLIC BLOOD PRESSURE: 88 MMHG | TEMPERATURE: 98.1 F | SYSTOLIC BLOOD PRESSURE: 122 MMHG | WEIGHT: 218 LBS

## 2020-06-25 DIAGNOSIS — E78.2 MIXED HYPERLIPIDEMIA: Primary | ICD-10-CM

## 2020-06-25 DIAGNOSIS — F32.A DEPRESSION, UNSPECIFIED DEPRESSION TYPE: ICD-10-CM

## 2020-06-25 DIAGNOSIS — E11.9 TYPE 2 DIABETES MELLITUS WITHOUT COMPLICATION, WITHOUT LONG-TERM CURRENT USE OF INSULIN (HCC): ICD-10-CM

## 2020-06-25 DIAGNOSIS — N32.81 OAB (OVERACTIVE BLADDER): ICD-10-CM

## 2020-06-25 DIAGNOSIS — R35.0 URINARY FREQUENCY: ICD-10-CM

## 2020-06-25 DIAGNOSIS — I10 ESSENTIAL HYPERTENSION: ICD-10-CM

## 2020-06-25 PROBLEM — M85.80 OSTEOPENIA: Status: ACTIVE | Noted: 2017-05-04

## 2020-06-25 PROBLEM — I83.813 VARICOSE VEINS OF BILATERAL LOWER EXTREMITIES WITH PAIN: Status: ACTIVE | Noted: 2017-05-04

## 2020-06-25 PROBLEM — E78.00 HYPERCHOLESTEROLEMIA: Status: ACTIVE | Noted: 2017-05-04

## 2020-06-25 PROBLEM — I83.93 SPIDER VEINS OF BOTH LOWER EXTREMITIES: Status: ACTIVE | Noted: 2017-08-30

## 2020-06-25 LAB
SL AMB  POCT GLUCOSE, UA: NORMAL
SL AMB LEUKOCYTE ESTERASE,UA: NORMAL
SL AMB POCT BILIRUBIN,UA: NORMAL
SL AMB POCT BLOOD,UA: NORMAL
SL AMB POCT CLARITY,UA: CLEAR
SL AMB POCT COLOR,UA: YELLOW
SL AMB POCT KETONES,UA: NORMAL
SL AMB POCT NITRITE,UA: NORMAL
SL AMB POCT PH,UA: 6
SL AMB POCT SPECIFIC GRAVITY,UA: 1.02
SL AMB POCT URINE PROTEIN: NORMAL
SL AMB POCT UROBILINOGEN: 0.2

## 2020-06-25 PROCEDURE — 81003 URINALYSIS AUTO W/O SCOPE: CPT | Performed by: PHYSICIAN ASSISTANT

## 2020-06-25 PROCEDURE — 3061F NEG MICROALBUMINURIA REV: CPT | Performed by: PHYSICIAN ASSISTANT

## 2020-06-25 PROCEDURE — 99214 OFFICE O/P EST MOD 30 MIN: CPT | Performed by: PHYSICIAN ASSISTANT

## 2020-06-25 PROCEDURE — 1036F TOBACCO NON-USER: CPT | Performed by: PHYSICIAN ASSISTANT

## 2020-06-25 PROCEDURE — 3079F DIAST BP 80-89 MM HG: CPT | Performed by: PHYSICIAN ASSISTANT

## 2020-06-25 PROCEDURE — 4010F ACE/ARB THERAPY RXD/TAKEN: CPT | Performed by: PHYSICIAN ASSISTANT

## 2020-06-25 PROCEDURE — 3074F SYST BP LT 130 MM HG: CPT | Performed by: PHYSICIAN ASSISTANT

## 2020-06-25 PROCEDURE — 1160F RVW MEDS BY RX/DR IN RCRD: CPT | Performed by: PHYSICIAN ASSISTANT

## 2020-06-25 PROCEDURE — 3008F BODY MASS INDEX DOCD: CPT | Performed by: PHYSICIAN ASSISTANT

## 2020-06-25 RX ORDER — AZELAIC ACID 0.15 G/G
1 GEL TOPICAL 2 TIMES DAILY
COMMUNITY
Start: 2020-05-21 | End: 2022-03-17

## 2020-06-25 RX ORDER — EZETIMIBE 10 MG/1
10 TABLET ORAL DAILY
Qty: 90 TABLET | Refills: 2 | Status: SHIPPED | OUTPATIENT
Start: 2020-06-25 | End: 2020-10-29 | Stop reason: SDUPTHER

## 2020-06-25 RX ORDER — EMOLLIENT COMBINATION NO.32
EMULSION, EXTENDED RELEASE TOPICAL
COMMUNITY
Start: 2020-06-19

## 2020-06-25 RX ORDER — DULOXETIN HYDROCHLORIDE 60 MG/1
60 CAPSULE, DELAYED RELEASE ORAL DAILY
COMMUNITY
Start: 2020-03-20 | End: 2021-12-02 | Stop reason: ALTCHOICE

## 2020-06-25 RX ORDER — ROSUVASTATIN CALCIUM 10 MG/1
TABLET, COATED ORAL
Qty: 30 TABLET | Refills: 1 | Status: CANCELLED | OUTPATIENT
Start: 2020-06-25

## 2020-06-25 RX ORDER — VALSARTAN 80 MG/1
80 TABLET ORAL DAILY
Qty: 90 TABLET | Refills: 2 | Status: SHIPPED | OUTPATIENT
Start: 2020-06-25 | End: 2021-03-04 | Stop reason: SDUPTHER

## 2020-06-25 RX ORDER — AMLODIPINE BESYLATE 10 MG/1
10 TABLET ORAL DAILY
Qty: 90 TABLET | Refills: 1 | Status: SHIPPED | OUTPATIENT
Start: 2020-06-25 | End: 2020-10-29 | Stop reason: SDUPTHER

## 2020-06-25 RX ORDER — SOLIFENACIN SUCCINATE 5 MG/1
5 TABLET, FILM COATED ORAL DAILY
Qty: 90 TABLET | Refills: 2 | Status: SHIPPED | OUTPATIENT
Start: 2020-06-25 | End: 2021-03-04 | Stop reason: SDUPTHER

## 2020-07-20 ENCOUNTER — HOSPITAL ENCOUNTER (OUTPATIENT)
Dept: RADIOLOGY | Age: 65
Discharge: HOME | End: 2020-07-20
Payer: COMMERCIAL

## 2020-07-20 ENCOUNTER — TELEPHONE (OUTPATIENT)
Dept: INTERNAL MEDICINE CLINIC | Age: 65
End: 2020-07-20

## 2020-07-20 DIAGNOSIS — M85.80 OSTEOPENIA, UNSPECIFIED LOCATION: ICD-10-CM

## 2020-07-20 PROCEDURE — 77080 DXA BONE DENSITY AXIAL: CPT

## 2020-07-20 NOTE — TELEPHONE ENCOUNTER
Pt wanted to let you know that the following medication is working, solifenacin (VESICARE) 5 mg tablet

## 2020-08-11 LAB
LEFT EYE DIABETIC RETINOPATHY: NORMAL
RIGHT EYE DIABETIC RETINOPATHY: NORMAL

## 2020-10-07 ENCOUNTER — TELEPHONE (OUTPATIENT)
Dept: INTERNAL MEDICINE CLINIC | Age: 65
End: 2020-10-07

## 2020-10-21 LAB
ALBUMIN SERPL-MCNC: 4.2 G/DL (ref 3.6–5.1)
ALBUMIN/GLOB SERPL: 1.4 (CALC) (ref 1–2.5)
ALP SERPL-CCNC: 52 U/L (ref 37–153)
ALT SERPL-CCNC: 18 U/L (ref 6–29)
AST SERPL-CCNC: 16 U/L (ref 10–35)
BASOPHILS # BLD AUTO: 48 CELLS/UL (ref 0–200)
BASOPHILS NFR BLD AUTO: 0.6 %
BILIRUB SERPL-MCNC: 0.7 MG/DL (ref 0.2–1.2)
BUN SERPL-MCNC: 13 MG/DL (ref 7–25)
BUN/CREAT SERPL: NORMAL (CALC) (ref 6–22)
CALCIUM SERPL-MCNC: 9.7 MG/DL (ref 8.6–10.4)
CHLORIDE SERPL-SCNC: 105 MMOL/L (ref 98–110)
CHOLEST SERPL-MCNC: 165 MG/DL
CHOLEST/HDLC SERPL: 2.6 (CALC)
CO2 SERPL-SCNC: 25 MMOL/L (ref 20–32)
CREAT SERPL-MCNC: 0.93 MG/DL (ref 0.5–0.99)
EOSINOPHIL # BLD AUTO: 200 CELLS/UL (ref 15–500)
EOSINOPHIL NFR BLD AUTO: 2.5 %
ERYTHROCYTE [DISTWIDTH] IN BLOOD BY AUTOMATED COUNT: 13.9 % (ref 11–15)
EST. AVERAGE GLUCOSE BLD GHB EST-MCNC: 143 (CALC)
EST. AVERAGE GLUCOSE BLD GHB EST-SCNC: 7.9 (CALC)
FERRITIN SERPL-MCNC: 71 NG/ML (ref 16–288)
GLOBULIN SER CALC-MCNC: 2.9 G/DL (CALC) (ref 1.9–3.7)
GLUCOSE SERPL-MCNC: 93 MG/DL (ref 65–99)
HBA1C MFR BLD: 6.6 % OF TOTAL HGB
HCT VFR BLD AUTO: 45.1 % (ref 35–45)
HDLC SERPL-MCNC: 64 MG/DL
HGB BLD-MCNC: 14.6 G/DL (ref 11.7–15.5)
IRON SATN MFR SERPL: 24 % (CALC) (ref 16–45)
IRON SERPL-MCNC: 75 MCG/DL (ref 45–160)
LDLC SERPL CALC-MCNC: 87 MG/DL (CALC)
LYMPHOCYTES # BLD AUTO: 1384 CELLS/UL (ref 850–3900)
LYMPHOCYTES NFR BLD AUTO: 17.3 %
MCH RBC QN AUTO: 28.6 PG (ref 27–33)
MCHC RBC AUTO-ENTMCNC: 32.4 G/DL (ref 32–36)
MCV RBC AUTO: 88.3 FL (ref 80–100)
MONOCYTES # BLD AUTO: 432 CELLS/UL (ref 200–950)
MONOCYTES NFR BLD AUTO: 5.4 %
NEUTROPHILS # BLD AUTO: 5936 CELLS/UL (ref 1500–7800)
NEUTROPHILS NFR BLD AUTO: 74.2 %
NONHDLC SERPL-MCNC: 101 MG/DL (CALC)
PLATELET # BLD AUTO: 314 THOUSAND/UL (ref 140–400)
PMV BLD REES-ECKER: 10.4 FL (ref 7.5–12.5)
POTASSIUM SERPL-SCNC: 3.7 MMOL/L (ref 3.5–5.3)
PROT SERPL-MCNC: 7.1 G/DL (ref 6.1–8.1)
RBC # BLD AUTO: 5.11 MILLION/UL (ref 3.8–5.1)
SL AMB EGFR AFRICAN AMERICAN: 74 ML/MIN/1.73M2
SL AMB EGFR NON AFRICAN AMERICAN: 64 ML/MIN/1.73M2
SODIUM SERPL-SCNC: 140 MMOL/L (ref 135–146)
TIBC SERPL-MCNC: 310 MCG/DL (CALC) (ref 250–450)
TRIGL SERPL-MCNC: 57 MG/DL
TSH SERPL-ACNC: 0.7 MIU/L (ref 0.4–4.5)
WBC # BLD AUTO: 8 THOUSAND/UL (ref 3.8–10.8)

## 2020-10-29 ENCOUNTER — OFFICE VISIT (OUTPATIENT)
Dept: INTERNAL MEDICINE CLINIC | Age: 65
End: 2020-10-29
Payer: COMMERCIAL

## 2020-10-29 VITALS
TEMPERATURE: 97.2 F | DIASTOLIC BLOOD PRESSURE: 60 MMHG | BODY MASS INDEX: 33.43 KG/M2 | HEART RATE: 83 BPM | WEIGHT: 213 LBS | SYSTOLIC BLOOD PRESSURE: 118 MMHG | HEIGHT: 67 IN | OXYGEN SATURATION: 98 %

## 2020-10-29 DIAGNOSIS — Z23 NEED FOR INFLUENZA VACCINATION: ICD-10-CM

## 2020-10-29 DIAGNOSIS — Z11.59 NEED FOR HEPATITIS C SCREENING TEST: ICD-10-CM

## 2020-10-29 DIAGNOSIS — I10 ESSENTIAL HYPERTENSION: Primary | ICD-10-CM

## 2020-10-29 DIAGNOSIS — E11.9 TYPE 2 DIABETES MELLITUS WITHOUT COMPLICATION, WITHOUT LONG-TERM CURRENT USE OF INSULIN (HCC): ICD-10-CM

## 2020-10-29 DIAGNOSIS — E78.2 MIXED HYPERLIPIDEMIA: ICD-10-CM

## 2020-10-29 PROCEDURE — 3288F FALL RISK ASSESSMENT DOCD: CPT | Performed by: PHYSICIAN ASSISTANT

## 2020-10-29 PROCEDURE — 1101F PT FALLS ASSESS-DOCD LE1/YR: CPT | Performed by: PHYSICIAN ASSISTANT

## 2020-10-29 PROCEDURE — 3725F SCREEN DEPRESSION PERFORMED: CPT | Performed by: PHYSICIAN ASSISTANT

## 2020-10-29 PROCEDURE — 99214 OFFICE O/P EST MOD 30 MIN: CPT | Performed by: PHYSICIAN ASSISTANT

## 2020-10-29 PROCEDURE — 3008F BODY MASS INDEX DOCD: CPT | Performed by: PHYSICIAN ASSISTANT

## 2020-10-29 PROCEDURE — 3078F DIAST BP <80 MM HG: CPT | Performed by: PHYSICIAN ASSISTANT

## 2020-10-29 PROCEDURE — 90471 IMMUNIZATION ADMIN: CPT

## 2020-10-29 PROCEDURE — 90662 IIV NO PRSV INCREASED AG IM: CPT

## 2020-10-29 PROCEDURE — 3074F SYST BP LT 130 MM HG: CPT | Performed by: PHYSICIAN ASSISTANT

## 2020-10-29 PROCEDURE — 1160F RVW MEDS BY RX/DR IN RCRD: CPT | Performed by: PHYSICIAN ASSISTANT

## 2020-10-29 RX ORDER — AMLODIPINE BESYLATE 10 MG/1
10 TABLET ORAL DAILY
Qty: 90 TABLET | Refills: 2 | Status: SHIPPED | OUTPATIENT
Start: 2020-10-29 | End: 2021-09-09 | Stop reason: SDUPTHER

## 2020-10-29 RX ORDER — EZETIMIBE 10 MG/1
10 TABLET ORAL DAILY
Qty: 90 TABLET | Refills: 2 | Status: SHIPPED | OUTPATIENT
Start: 2020-10-29 | End: 2020-10-29

## 2021-03-04 ENCOUNTER — OFFICE VISIT (OUTPATIENT)
Dept: INTERNAL MEDICINE CLINIC | Age: 66
End: 2021-03-04
Payer: COMMERCIAL

## 2021-03-04 VITALS
WEIGHT: 196 LBS | HEIGHT: 68 IN | BODY MASS INDEX: 29.7 KG/M2 | DIASTOLIC BLOOD PRESSURE: 68 MMHG | HEART RATE: 75 BPM | TEMPERATURE: 96.9 F | OXYGEN SATURATION: 99 % | SYSTOLIC BLOOD PRESSURE: 124 MMHG

## 2021-03-04 DIAGNOSIS — I10 ESSENTIAL HYPERTENSION: ICD-10-CM

## 2021-03-04 DIAGNOSIS — Z12.31 ENCOUNTER FOR SCREENING MAMMOGRAM FOR MALIGNANT NEOPLASM OF BREAST: ICD-10-CM

## 2021-03-04 DIAGNOSIS — D50.9 IRON DEFICIENCY ANEMIA, UNSPECIFIED IRON DEFICIENCY ANEMIA TYPE: Primary | ICD-10-CM

## 2021-03-04 DIAGNOSIS — E11.9 TYPE 2 DIABETES MELLITUS WITHOUT COMPLICATION, WITHOUT LONG-TERM CURRENT USE OF INSULIN (HCC): ICD-10-CM

## 2021-03-04 DIAGNOSIS — E78.2 MIXED HYPERLIPIDEMIA: ICD-10-CM

## 2021-03-04 DIAGNOSIS — R73.01 IMPAIRED FASTING GLUCOSE: ICD-10-CM

## 2021-03-04 DIAGNOSIS — N32.81 OAB (OVERACTIVE BLADDER): ICD-10-CM

## 2021-03-04 LAB
ALBUMIN SERPL-MCNC: 4.7 G/DL (ref 3.6–5.1)
ALBUMIN/GLOB SERPL: 1.8 (CALC) (ref 1–2.5)
ALP SERPL-CCNC: 51 U/L (ref 37–153)
ALT SERPL-CCNC: 20 U/L (ref 6–29)
AST SERPL-CCNC: 19 U/L (ref 10–35)
BASOPHILS # BLD AUTO: 59 CELLS/UL (ref 0–200)
BASOPHILS NFR BLD AUTO: 0.9 %
BILIRUB SERPL-MCNC: 0.7 MG/DL (ref 0.2–1.2)
BUN SERPL-MCNC: 9 MG/DL (ref 7–25)
BUN/CREAT SERPL: ABNORMAL (CALC) (ref 6–22)
CALCIUM SERPL-MCNC: 9.8 MG/DL (ref 8.6–10.4)
CHLORIDE SERPL-SCNC: 105 MMOL/L (ref 98–110)
CHOLEST SERPL-MCNC: 165 MG/DL
CHOLEST/HDLC SERPL: 2.6 (CALC)
CO2 SERPL-SCNC: 28 MMOL/L (ref 20–32)
CREAT SERPL-MCNC: 0.86 MG/DL (ref 0.5–0.99)
EOSINOPHIL # BLD AUTO: 319 CELLS/UL (ref 15–500)
EOSINOPHIL NFR BLD AUTO: 4.9 %
ERYTHROCYTE [DISTWIDTH] IN BLOOD BY AUTOMATED COUNT: 13.2 % (ref 11–15)
EST. AVERAGE GLUCOSE BLD GHB EST-MCNC: 128 (CALC)
EST. AVERAGE GLUCOSE BLD GHB EST-SCNC: 7.1 (CALC)
GLOBULIN SER CALC-MCNC: 2.6 G/DL (CALC) (ref 1.9–3.7)
GLUCOSE SERPL-MCNC: 100 MG/DL (ref 65–99)
HBA1C MFR BLD: 6.1 % OF TOTAL HGB
HCT VFR BLD AUTO: 48.7 % (ref 35–45)
HCV AB S/CO SERPL IA: 0.03
HCV AB SERPL QL IA: NORMAL
HDLC SERPL-MCNC: 64 MG/DL
HGB BLD-MCNC: 15.5 G/DL (ref 11.7–15.5)
LDLC SERPL CALC-MCNC: 86 MG/DL (CALC)
LYMPHOCYTES # BLD AUTO: 1365 CELLS/UL (ref 850–3900)
LYMPHOCYTES NFR BLD AUTO: 21 %
MCH RBC QN AUTO: 28.2 PG (ref 27–33)
MCHC RBC AUTO-ENTMCNC: 31.8 G/DL (ref 32–36)
MCV RBC AUTO: 88.7 FL (ref 80–100)
MONOCYTES # BLD AUTO: 449 CELLS/UL (ref 200–950)
MONOCYTES NFR BLD AUTO: 6.9 %
NEUTROPHILS # BLD AUTO: 4310 CELLS/UL (ref 1500–7800)
NEUTROPHILS NFR BLD AUTO: 66.3 %
NONHDLC SERPL-MCNC: 101 MG/DL (CALC)
PLATELET # BLD AUTO: 270 THOUSAND/UL (ref 140–400)
PMV BLD REES-ECKER: 10.7 FL (ref 7.5–12.5)
POTASSIUM SERPL-SCNC: 3.6 MMOL/L (ref 3.5–5.3)
PROT SERPL-MCNC: 7.3 G/DL (ref 6.1–8.1)
RBC # BLD AUTO: 5.49 MILLION/UL (ref 3.8–5.1)
SL AMB EGFR AFRICAN AMERICAN: 82 ML/MIN/1.73M2
SL AMB EGFR NON AFRICAN AMERICAN: 70 ML/MIN/1.73M2
SODIUM SERPL-SCNC: 143 MMOL/L (ref 135–146)
TRIGL SERPL-MCNC: 65 MG/DL
WBC # BLD AUTO: 6.5 THOUSAND/UL (ref 3.8–10.8)

## 2021-03-04 PROCEDURE — 3008F BODY MASS INDEX DOCD: CPT | Performed by: PHYSICIAN ASSISTANT

## 2021-03-04 PROCEDURE — 1160F RVW MEDS BY RX/DR IN RCRD: CPT | Performed by: PHYSICIAN ASSISTANT

## 2021-03-04 PROCEDURE — 3044F HG A1C LEVEL LT 7.0%: CPT | Performed by: PHYSICIAN ASSISTANT

## 2021-03-04 PROCEDURE — 4010F ACE/ARB THERAPY RXD/TAKEN: CPT | Performed by: PHYSICIAN ASSISTANT

## 2021-03-04 PROCEDURE — 3725F SCREEN DEPRESSION PERFORMED: CPT | Performed by: PHYSICIAN ASSISTANT

## 2021-03-04 PROCEDURE — 3078F DIAST BP <80 MM HG: CPT | Performed by: PHYSICIAN ASSISTANT

## 2021-03-04 PROCEDURE — 1036F TOBACCO NON-USER: CPT | Performed by: PHYSICIAN ASSISTANT

## 2021-03-04 PROCEDURE — 99214 OFFICE O/P EST MOD 30 MIN: CPT | Performed by: PHYSICIAN ASSISTANT

## 2021-03-04 PROCEDURE — 3074F SYST BP LT 130 MM HG: CPT | Performed by: PHYSICIAN ASSISTANT

## 2021-03-04 RX ORDER — ARIPIPRAZOLE 5 MG/1
5 TABLET ORAL DAILY
COMMUNITY
Start: 2021-01-04 | End: 2021-12-02 | Stop reason: ALTCHOICE

## 2021-03-04 RX ORDER — SOLIFENACIN SUCCINATE 5 MG/1
5 TABLET, FILM COATED ORAL DAILY
Qty: 90 TABLET | Refills: 1 | Status: SHIPPED | OUTPATIENT
Start: 2021-03-04 | End: 2021-12-02 | Stop reason: SDUPTHER

## 2021-03-04 RX ORDER — SITAGLIPTIN AND METFORMIN HYDROCHLORIDE 1000; 50 MG/1; MG/1
1 TABLET, FILM COATED ORAL 2 TIMES DAILY
Qty: 180 TABLET | Refills: 1 | Status: SHIPPED | OUTPATIENT
Start: 2021-03-04 | End: 2021-09-09 | Stop reason: SDUPTHER

## 2021-03-04 RX ORDER — ROSUVASTATIN CALCIUM 10 MG/1
TABLET, COATED ORAL
Qty: 36 TABLET | Refills: 5 | Status: SHIPPED | OUTPATIENT
Start: 2021-03-04 | End: 2022-03-17 | Stop reason: SDUPTHER

## 2021-03-04 RX ORDER — VALSARTAN 80 MG/1
80 TABLET ORAL DAILY
Qty: 90 TABLET | Refills: 1 | Status: SHIPPED | OUTPATIENT
Start: 2021-03-04 | End: 2022-03-17 | Stop reason: SDUPTHER

## 2021-03-04 NOTE — PROGRESS NOTES
Assessment/Plan:         Diagnoses and all orders for this visit:    Essential hypertension  Comments:  well controlled   Orders:  -     valsartan (DIOVAN) 80 mg tablet; Take 1 tablet (80 mg total) by mouth daily    OAB (overactive bladder)  Comments:  improved with vesicare  Orders:  -     solifenacin (VESICARE) 5 mg tablet; Take 1 tablet (5 mg total) by mouth daily    Impaired fasting glucose  -     sitaGLIPtin-metFORMIN (Janumet)  MG per tablet; Take 1 tablet by mouth 2 (two) times a day    Mixed hyperlipidemia  Comments:  low chol diet   continue crestor - pt tolerating   Orders:  -     rosuvastatin (CRESTOR) 10 MG tablet; TAKE 1 TABLET BY MOUTH MONDAY, WEDNESDAY, AND FRIDAY    Other orders  -     ARIPiprazole (ABILIFY) 5 mg tablet; Take 5 mg by mouth daily        BMI Counseling: Body mass index is 30 23 kg/m²  The BMI is above normal  Nutrition recommendations include decreasing portion sizes, decreasing fast food intake, consuming healthier snacks and limiting drinks that contain sugar  Exercise recommendations include exercising 3-5 times per week  Subjective:      Patient ID: Sancho Alvarado is a 76 y o  female  77 y/o female with hx of dm, htn, dyslipidemia  Pt has been following clean diet, juicing and making smoothies daily  She has lost 17 lbs since last visit   Pt plans to resume workouts at gym in the spring / summer  Walking her dog every morning 20 min / day      The following portions of the patient's history were reviewed and updated as appropriate: allergies, current medications, past family history, past medical history, past social history, past surgical history and problem list     Review of Systems   Constitutional: Negative for appetite change, chills, fatigue and fever  HENT: Negative for congestion and sore throat  Respiratory: Negative for cough, shortness of breath and wheezing  Cardiovascular: Negative for chest pain     Gastrointestinal: Negative for abdominal pain, diarrhea and nausea  Musculoskeletal: Negative for arthralgias and back pain  Skin: Negative for rash  Neurological: Negative for dizziness and headaches  Psychiatric/Behavioral: Negative for sleep disturbance  The patient is not nervous/anxious            Past Medical History:   Diagnosis Date    Acquired acanthosis nigricans     Anxiety     Depression 10/3/2017    Essential hypertension     HLD (hyperlipidemia)     Hypertension     Prediabetes     Type 2 diabetes mellitus without complication ([facility] )     last assessed: 10/03/2017         Current Outpatient Medications:     amLODIPine (NORVASC) 10 mg tablet, Take 1 tablet (10 mg total) by mouth daily, Disp: 90 tablet, Rfl: 2    ammonium lactate (LAC-HYDRIN) 12 % lotion, Apply topically daily as needed for dry skin, Disp: 400 g, Rfl: 0    Azelaic Acid 15 % cream, Apply 1 application topically 2 (two) times a day To affected area, Disp: , Rfl:     clonazePAM (KlonoPIN) 2 mg tablet, Take 2 mg by mouth as needed (1/2 tablet as needed) , Disp: , Rfl:     Dermatological Products, Misc  (EPICERAM) lotion, APPLY TO THE AFFECTED AREA(S) 1 TO 2 TIMES DAILY, Disp: , Rfl:     DULoxetine (CYMBALTA) 60 mg delayed release capsule, Take 60 mg by mouth daily, Disp: , Rfl:     eszopiclone (LUNESTA) tablet, Take 3 mg by mouth daily at bedtime as needed, Disp: , Rfl: 0    [id number]-[id number]-B Cmp-C-Biot (INTEGRA PLUS) CAPS, Take 1 tablet by mouth daily, Disp: , Rfl: 0    FINACEA 15 % FOAM, 2 (two) times a day, Disp: , Rfl: 0    hydroquinone 4 % cream, as needed, Disp: , Rfl: 0    rosuvastatin (CRESTOR) 10 MG tablet, TAKE 1 TABLET BY MOUTH MONDAY, WEDNESDAY, AND FRIDAY, Disp: 36 tablet, Rfl: 5    sitaGLIPtin-metFORMIN (Janumet)  MG per tablet, Take 1 tablet by mouth 2 (two) times a day, Disp: 180 tablet, Rfl: 1    solifenacin (VESICARE) 5 mg tablet, Take 1 tablet (5 mg total) by mouth daily, Disp: 90 tablet, Rfl: 1    tazarotene (AVAGE) 0 1 % cream, Apply topically daily , Disp: , Rfl: 0    valsartan (DIOVAN) 80 mg tablet, Take 1 tablet (80 mg total) by mouth daily, Disp: 90 tablet, Rfl: 1    WELLBUTRIN  MG 24 hr tablet, Take 1 tablet by mouth daily, Disp: , Rfl: 0    ARIPiprazole (ABILIFY) 5 mg tablet, Take 5 mg by mouth daily, Disp: , Rfl:     Allergies   Allergen Reactions    Escitalopram Rash    Sulfa Antibiotics Hives and Rash    Food Other (See Comments)     [facility] - [id number]: CORN - causes extreme sleepiness       Social History   Past Surgical History:   Procedure Laterality Date    APPENDECTOMY      TOTAL ABDOMINAL HYSTERECTOMY  1989    age 27     Family History   Problem Relation Age of Onset    Diabetes Mother     Stroke Mother     No Known Problems Father     No Known Problems Sister     No Known Problems Brother     No Known Problems Brother     No Known Problems Brother        Objective:  /68 (BP Location: Left arm, Patient Position: Sitting, Cuff Size: Standard)   Pulse 75   Temp (!) 96 9 °F (36 1 °C) (Temporal)   Ht 5' 7 52" (1 715 m) Comment: shoes on  Wt 88 9 kg (196 lb) Comment: shoes on  SpO2 99%   BMI 30 23 kg/m²        Physical Exam  Vitals signs reviewed  Constitutional:       General: She is not in acute distress  HENT:      Head: Normocephalic  Nose: Nose normal    Eyes:      Extraocular Movements: Extraocular movements intact  Conjunctiva/sclera: Conjunctivae normal       Pupils: Pupils are equal, round, and reactive to light  Cardiovascular:      Rate and Rhythm: Normal rate and regular rhythm  Musculoskeletal: Normal range of motion  Right lower leg: No edema  Left lower leg: No edema  Skin:     General: Skin is warm  Findings: No rash  Neurological:      General: No focal deficit present  Mental Status: She is alert and oriented to person, place, and time     Psychiatric:         Mood and Affect: Mood normal          Behavior: Behavior normal

## 2021-03-08 ENCOUNTER — TELEPHONE (OUTPATIENT)
Dept: INTERNAL MEDICINE CLINIC | Age: 66
End: 2021-03-08

## 2021-03-08 NOTE — TELEPHONE ENCOUNTER
Needs a letter for work stating that she can not wear latex gloves, she can use  nitrile exam gloves latex free     Please advise if ok to write letter

## 2021-03-08 NOTE — LETTER
March 8, 2021     Patient: Stephania Salamanca   YOB: 1955   Date of Visit: 3/8/2021       To Whom it May Concern:    Stephania Salamanca has a latex allergy and can not use latex gloves  Please contact our office with any questions or concerns       EVELIO Olvera

## 2021-03-10 PROBLEM — E11.9 TYPE 2 DIABETES MELLITUS WITHOUT COMPLICATION, WITHOUT LONG-TERM CURRENT USE OF INSULIN (HCC): Status: RESOLVED | Noted: 2021-03-04 | Resolved: 2021-03-10

## 2021-05-24 ENCOUNTER — TELEPHONE (OUTPATIENT)
Dept: INTERNAL MEDICINE CLINIC | Facility: CLINIC | Age: 66
End: 2021-05-24

## 2021-06-24 ENCOUNTER — VBI (OUTPATIENT)
Dept: ADMINISTRATIVE | Facility: OTHER | Age: 66
End: 2021-06-24

## 2021-08-10 ENCOUNTER — HOSPITAL ENCOUNTER (OUTPATIENT)
Dept: MAMMOGRAPHY | Facility: HOSPITAL | Age: 66
Discharge: HOME | End: 2021-08-10
Payer: COMMERCIAL

## 2021-08-10 VITALS — HEIGHT: 68 IN | WEIGHT: 196 LBS | BODY MASS INDEX: 29.7 KG/M2

## 2021-08-10 DIAGNOSIS — Z12.31 ENCOUNTER FOR SCREENING MAMMOGRAM FOR MALIGNANT NEOPLASM OF BREAST: ICD-10-CM

## 2021-08-10 PROCEDURE — 77063 BREAST TOMOSYNTHESIS BI: CPT

## 2021-08-10 PROCEDURE — 77067 SCR MAMMO BI INCL CAD: CPT

## 2021-09-09 ENCOUNTER — OFFICE VISIT (OUTPATIENT)
Dept: INTERNAL MEDICINE CLINIC | Age: 66
End: 2021-09-09
Payer: COMMERCIAL

## 2021-09-09 VITALS
DIASTOLIC BLOOD PRESSURE: 60 MMHG | HEART RATE: 81 BPM | TEMPERATURE: 98 F | SYSTOLIC BLOOD PRESSURE: 108 MMHG | HEIGHT: 68 IN | BODY MASS INDEX: 31.67 KG/M2 | OXYGEN SATURATION: 98 % | WEIGHT: 209 LBS

## 2021-09-09 DIAGNOSIS — D64.9 ANEMIA, UNSPECIFIED TYPE: ICD-10-CM

## 2021-09-09 DIAGNOSIS — E78.2 MIXED HYPERLIPIDEMIA: ICD-10-CM

## 2021-09-09 DIAGNOSIS — I10 ESSENTIAL HYPERTENSION: ICD-10-CM

## 2021-09-09 DIAGNOSIS — E55.9 VITAMIN D DEFICIENCY: ICD-10-CM

## 2021-09-09 DIAGNOSIS — Z00.00 ANNUAL PHYSICAL EXAM: ICD-10-CM

## 2021-09-09 DIAGNOSIS — R73.01 IMPAIRED FASTING GLUCOSE: Primary | ICD-10-CM

## 2021-09-09 PROCEDURE — 99214 OFFICE O/P EST MOD 30 MIN: CPT | Performed by: PHYSICIAN ASSISTANT

## 2021-09-09 PROCEDURE — 3078F DIAST BP <80 MM HG: CPT | Performed by: PHYSICIAN ASSISTANT

## 2021-09-09 PROCEDURE — 3288F FALL RISK ASSESSMENT DOCD: CPT | Performed by: PHYSICIAN ASSISTANT

## 2021-09-09 PROCEDURE — 3008F BODY MASS INDEX DOCD: CPT | Performed by: PHYSICIAN ASSISTANT

## 2021-09-09 PROCEDURE — 99397 PER PM REEVAL EST PAT 65+ YR: CPT | Performed by: PHYSICIAN ASSISTANT

## 2021-09-09 PROCEDURE — 1101F PT FALLS ASSESS-DOCD LE1/YR: CPT | Performed by: PHYSICIAN ASSISTANT

## 2021-09-09 PROCEDURE — 1160F RVW MEDS BY RX/DR IN RCRD: CPT | Performed by: PHYSICIAN ASSISTANT

## 2021-09-09 PROCEDURE — 3074F SYST BP LT 130 MM HG: CPT | Performed by: PHYSICIAN ASSISTANT

## 2021-09-09 PROCEDURE — 1036F TOBACCO NON-USER: CPT | Performed by: PHYSICIAN ASSISTANT

## 2021-09-09 RX ORDER — AMLODIPINE BESYLATE 10 MG/1
10 TABLET ORAL DAILY
Qty: 90 TABLET | Refills: 1 | Status: SHIPPED | OUTPATIENT
Start: 2021-09-09 | End: 2022-05-12 | Stop reason: SDUPTHER

## 2021-09-09 RX ORDER — SITAGLIPTIN AND METFORMIN HYDROCHLORIDE 1000; 50 MG/1; MG/1
1 TABLET, FILM COATED ORAL 2 TIMES DAILY
Qty: 180 TABLET | Refills: 1 | Status: SHIPPED | OUTPATIENT
Start: 2021-09-09 | End: 2022-01-06 | Stop reason: SDUPTHER

## 2021-09-09 NOTE — PROGRESS NOTES
Assessment and Plan:     Problem List Items Addressed This Visit        Cardiovascular and Mediastinum    Essential hypertension    Relevant Medications    amLODIPine (NORVASC) 10 mg tablet    Other Relevant Orders    CBC and differential    Comprehensive metabolic panel    HEMOGLOBIN A1C W/ EAG ESTIMATION    TSH, 3rd generation      Other Visit Diagnoses     Impaired fasting glucose    -  Primary    f/u labs due  continue with daily exercise     Relevant Medications    sitaGLIPtin-metFORMIN (Janumet)  MG per tablet    Other Relevant Orders    CBC and differential    Comprehensive metabolic panel    HEMOGLOBIN A1C W/ EAG ESTIMATION    TSH, 3rd generation    Mixed hyperlipidemia        low chol diet     Relevant Orders    CBC and differential    Comprehensive metabolic panel    HEMOGLOBIN A1C W/ EAG ESTIMATION    TSH, 3rd generation    Lipid panel    Anemia, unspecified type        continue iron supplement   check ferritin level    Relevant Orders    Ferritin    Vitamin D deficiency        Relevant Orders    Vitamin D 25 hydroxy    Annual physical exam               Preventive health issues were discussed with patient, and age appropriate screening tests were ordered as noted in patient's After Visit Summary  Personalized health advice and appropriate referrals for health education or preventive services given if needed, as noted in patient's After Visit Summary  History of Present Illness:     Patient presents for comprehensive PE    Patient Care Team:  Adriana Lindsey MD as PCP - General (Internal Medicine)  Enrique Reynaga MD Mia Sero, MD Christiane Broaden Doctor, MD     Review of Systems:     Review of Systems   Constitutional: Negative for chills, fatigue and fever  HENT: Negative for congestion, ear pain and sore throat  Eyes: Negative for redness, itching and visual disturbance  Respiratory: Negative for cough, shortness of breath and wheezing  Cardiovascular: Negative for chest pain, palpitations and leg swelling  Gastrointestinal: Negative for abdominal pain, constipation, diarrhea and nausea  Genitourinary: Negative for difficulty urinating, dysuria and hematuria  Musculoskeletal: Negative for arthralgias, back pain, joint swelling and myalgias  Skin: Negative for rash and wound  Allergic/Immunologic: Negative for environmental allergies  Neurological: Negative for dizziness, syncope, light-headedness and headaches  Hematological: Does not bruise/bleed easily  Psychiatric/Behavioral: Negative for confusion and sleep disturbance  The patient is not nervous/anxious         Problem List:     Patient Active Problem List   Diagnosis    Bronchitis    Depression    Varicose veins of bilateral lower extremities with pain    Spider veins of both lower extremities    Osteopenia    Hypercholesterolemia    Essential hypertension    Prediabetes    Impaired fasting blood sugar      Past Medical and Surgical History:     Past Medical History:   Diagnosis Date    Acquired acanthosis nigricans     Anxiety     Depression 10/3/2017    Essential hypertension     HLD (hyperlipidemia)     Hypertension     Prediabetes     Type 2 diabetes mellitus without complication ([facility] )     last assessed: 10/03/2017    Type 2 diabetes mellitus without complication, without long-term current use of insulin ([facility] ) 3/4/2021     Past Surgical History:   Procedure Laterality Date    APPENDECTOMY      TOTAL ABDOMINAL HYSTERECTOMY  1989    age 27      Family History:     Family History   Problem Relation Age of Onset    Diabetes Mother     Stroke Mother     No Known Problems Father     No Known Problems Sister     No Known Problems Daughter     No Known Problems Brother     No Known Problems Brother     No Known Problems Brother     No Known Problems Half-Sister       Social History:     Social History     Socioeconomic History    Marital status:      Spouse name: None    Number of children: None    Years of education: None    Highest education level: None   Occupational History    None   Tobacco Use    Smoking status: Never Smoker    Smokeless tobacco: Never Used   Vaping Use    Vaping Use: Never used   Substance and Sexual Activity    Alcohol use: No    Drug use: No    Sexual activity: Yes   Other Topics Concern    None   Social History Narrative    None     Social Determinants of Health     Financial Resource Strain:     Difficulty of Paying Living Expenses:    Food Insecurity:     Worried About Running Out of Food in the Last Year:     Ran Out of Food in the Last Year:    Transportation Needs:     Lack of Transportation (Medical):      Lack of Transportation (Non-Medical):    Physical Activity:     Days of Exercise per Week:     Minutes of Exercise per Session:    Stress:     Feeling of Stress :    Social Connections:     Frequency of Communication with Friends and Family:     Frequency of Social Gatherings with Friends and Family:     Attends Rastafari Services:     Active Member of Clubs or Organizations:     Attends Club or Organization Meetings:     Marital Status:    Intimate Partner Violence:     Fear of Current or Ex-Partner:     Emotionally Abused:     Physically Abused:     Sexually Abused:       Medications and Allergies:     Current Outpatient Medications   Medication Sig Dispense Refill    amLODIPine (NORVASC) 10 mg tablet Take 1 tablet (10 mg total) by mouth daily 90 tablet 1    ammonium lactate (LAC-HYDRIN) 12 % lotion Apply topically daily as needed for dry skin 400 g 0    Azelaic Acid 15 % cream Apply 1 application topically 2 (two) times a day To affected area      clonazePAM (KlonoPIN) 2 mg tablet Take 2 mg by mouth as needed (1/2 tablet as needed)       Dermatological Products, Misc  (EPICERAM) lotion APPLY TO THE AFFECTED AREA(S) 1 TO 2 TIMES DAILY      DULoxetine (CYMBALTA) 60 mg delayed release capsule Take 60 mg by mouth daily      eszopiclone (LUNESTA) tablet Take 3 mg by mouth daily at bedtime as needed  0    [id number]-[id number]-[id number]-B Cmp-C-Biot (INTEGRA PLUS) CAPS Take 1 tablet by mouth daily  0    FINACEA 15 % FOAM 2 (two) times a day  0    rosuvastatin (CRESTOR) 10 MG tablet TAKE 1 TABLET BY MOUTH MONDAY, WEDNESDAY, AND FRIDAY 36 tablet 5    sitaGLIPtin-metFORMIN (Janumet)  MG per tablet Take 1 tablet by mouth 2 (two) times a day 180 tablet 1    solifenacin (VESICARE) 5 mg tablet Take 1 tablet (5 mg total) by mouth daily 90 tablet 1    tazarotene (AVAGE) 0 1 % cream Apply topically daily   0    valsartan (DIOVAN) 80 mg tablet Take 1 tablet (80 mg total) by mouth daily 90 tablet 1    WELLBUTRIN  MG 24 hr tablet Take 1 tablet by mouth daily  0    ARIPiprazole (ABILIFY) 5 mg tablet Take 5 mg by mouth daily (Patient not taking: Reported on 9/9/2021)      hydroquinone 4 % cream as needed (Patient not taking: Reported on 9/9/2021)  0     No current facility-administered medications for this visit       Allergies   Allergen Reactions    Escitalopram Rash    Sulfa Antibiotics Hives and Rash    Food Other (See Comments)     Annotation - [id number]: CORN - causes extreme sleepiness    Latex Rash      Immunizations:     Immunization History   Administered Date(s) Administered    Influenza, high dose seasonal 0 7 mL 10/29/2020    Tdap 11/26/2019      Health Maintenance:         Topic Date Due    Breast Cancer Screening: Mammogram  08/10/2022    Colorectal Cancer Screening  10/16/2025    Hepatitis C Screening  Completed         Topic Date Due    Pneumococcal Vaccine: 65+ Years (1 of 2 - PPSV23) Never done    COVID-19 Vaccine (1) Never done    Influenza Vaccine (1) 09/01/2021                  Physical Exam:     /60 (BP Location: Left arm, Patient Position: Sitting, Cuff Size: Standard)   Pulse 81   Temp 98 °F (36 7 °C) (Temporal)   Ht 5' 7 5" (1 715 m)   Wt 94 8 kg (209 lb)   SpO2 98%   BMI 32 25 kg/m²     Physical Exam  Vitals and nursing note reviewed  Constitutional:       General: She is not in acute distress  Appearance: She is well-developed  HENT:      Head: Normocephalic and atraumatic  Right Ear: Tympanic membrane, ear canal and external ear normal       Left Ear: Tympanic membrane, ear canal and external ear normal       Nose: Nose normal       Mouth/Throat:      Mouth: Mucous membranes are moist    Eyes:      Conjunctiva/sclera: Conjunctivae normal    Cardiovascular:      Rate and Rhythm: Normal rate and regular rhythm  Heart sounds: No murmur heard  Pulmonary:      Effort: Pulmonary effort is normal  No respiratory distress  Breath sounds: Normal breath sounds  Abdominal:      Palpations: Abdomen is soft  Tenderness: There is no abdominal tenderness  Musculoskeletal:         General: Normal range of motion  Cervical back: Normal range of motion and neck supple  Right lower leg: No edema  Left lower leg: No edema  Lymphadenopathy:      Cervical: No cervical adenopathy  Skin:     General: Skin is warm and dry  Findings: No erythema or rash  Neurological:      General: No focal deficit present  Mental Status: She is alert and oriented to person, place, and time     Psychiatric:         Mood and Affect: Mood normal          Behavior: Behavior normal           Megha Crenshaw PA-C

## 2021-09-09 NOTE — PROGRESS NOTES
Assessment/Plan:         Diagnoses and all orders for this visit:    Impaired fasting glucose  Comments:  f/u labs due  continue with daily exercise   Orders:  -     sitaGLIPtin-metFORMIN (Janumet)  MG per tablet; Take 1 tablet by mouth 2 (two) times a day  -     CBC and differential; Future  -     Comprehensive metabolic panel; Future  -     HEMOGLOBIN A1C W/ EAG ESTIMATION; Future  -     TSH, 3rd generation; Future    Essential hypertension  Comments:  well controlled   Orders:  -     amLODIPine (NORVASC) 10 mg tablet; Take 1 tablet (10 mg total) by mouth daily  -     CBC and differential; Future  -     Comprehensive metabolic panel; Future  -     HEMOGLOBIN A1C W/ EAG ESTIMATION; Future  -     TSH, 3rd generation; Future    Mixed hyperlipidemia  Comments:  low chol diet   Orders:  -     CBC and differential; Future  -     Comprehensive metabolic panel; Future  -     HEMOGLOBIN A1C W/ EAG ESTIMATION; Future  -     TSH, 3rd generation; Future  -     Lipid panel; Future    Anemia, unspecified type  Comments:  continue iron supplement   check ferritin level  Orders:  -     Ferritin; Future    Vitamin D deficiency  -     Vitamin D 25 hydroxy; Future             Subjective:      Patient ID: Antoinette Knapp is a 76 y o  female      77 y/o female with hx of dm, htn, depression   Pt reports she has gone back to gym and is working out 5 times / week  Doing 30-40 min of cardio with bike or elliptical and some weight lifting   Pt concerned that she has gained weight but feels this is due to muscle building   Last labs 3/21     Pt notes some drooping of her L eyelid - she is scheduled for eval with dr Carla Azul this month          The following portions of the patient's history were reviewed and updated as appropriate: allergies, current medications, past family history, past medical history, past social history, past surgical history and problem list     Review of Systems   Constitutional: Negative for appetite change, chills, diaphoresis, fatigue and fever  HENT: Negative for congestion and sore throat  Eyes: Negative for pain and redness  Respiratory: Negative for cough, shortness of breath and wheezing  Cardiovascular: Negative for chest pain and leg swelling  Gastrointestinal: Negative for abdominal pain, constipation, diarrhea and nausea  Genitourinary: Negative for dysuria and flank pain  Musculoskeletal: Negative for arthralgias and back pain  Skin: Negative for rash  Neurological: Negative for dizziness, facial asymmetry and headaches  Psychiatric/Behavioral: Negative for sleep disturbance  The patient is not nervous/anxious            Past Medical History:   Diagnosis Date    Acquired acanthosis nigricans     Anxiety     Depression 10/3/2017    Essential hypertension     HLD (hyperlipidemia)     Hypertension     Prediabetes     [facility]     last assessed: 10/03/2017    Type 2 diabetes mellitus without complication, without long-term current use of insulin ([facility]) 3/4/2021         Current Outpatient Medications:     amLODIPine (NORVASC) 10 mg tablet, Take 1 tablet (10 mg total) by mouth daily, Disp: 90 tablet, Rfl: 1    ammonium lactate (LAC-HYDRIN) 12 % lotion, Apply topically daily as needed for dry skin, Disp: 400 g, Rfl: 0    Azelaic Acid 15 % cream, Apply 1 application topically 2 (two) times a day To affected area, Disp: , Rfl:     clonazePAM (KlonoPIN) 2 mg tablet, Take 2 mg by mouth as needed (1/2 tablet as needed) , Disp: , Rfl:     Dermatological Products, Misc  (EPICERAM) lotion, APPLY TO THE AFFECTED AREA(S) 1 TO 2 TIMES DAILY, Disp: , Rfl:     DULoxetine (CYMBALTA) 60 mg delayed release capsule, Take 60 mg by mouth daily, Disp: , Rfl:     eszopiclone (LUNESTA) tablet, Take 3 mg by mouth daily at bedtime as needed, Disp: , Rfl: 0    VmSow-[id number]-[id number]-B Cmp-C-Biot (INTEGRA PLUS) CAPS, Take 1 tablet by mouth daily, Disp: , Rfl: 0   FINACEA 15 % FOAM, 2 (two) times a day, Disp: , Rfl: 0    rosuvastatin (CRESTOR) 10 MG tablet, TAKE 1 TABLET BY MOUTH MONDAY, WEDNESDAY, AND FRIDAY, Disp: 36 tablet, Rfl: 5    sitaGLIPtin-metFORMIN (Janumet)  MG per tablet, Take 1 tablet by mouth 2 (two) times a day, Disp: 180 tablet, Rfl: 1    solifenacin (VESICARE) 5 mg tablet, Take 1 tablet (5 mg total) by mouth daily, Disp: 90 tablet, Rfl: 1    tazarotene (AVAGE) 0 1 % cream, Apply topically daily , Disp: , Rfl: 0    valsartan (DIOVAN) 80 mg tablet, Take 1 tablet (80 mg total) by mouth daily, Disp: 90 tablet, Rfl: 1    WELLBUTRIN  MG 24 hr tablet, Take 1 tablet by mouth daily, Disp: , Rfl: 0    ARIPiprazole (ABILIFY) 5 mg tablet, Take 5 mg by mouth daily (Patient not taking: Reported on 9/9/2021), Disp: , Rfl:     hydroquinone 4 % cream, as needed (Patient not taking: Reported on 9/9/2021), Disp: , Rfl: 0    Allergies   Allergen Reactions    Escitalopram Rash    Sulfa Antibiotics Hives and Rash    Food Other (See Comments)     Annotation - [id number]: CORN - causes extreme sleepiness    Latex Rash       Social History   Past Surgical History:   Procedure Laterality Date    APPENDECTOMY      TOTAL ABDOMINAL HYSTERECTOMY  1989    age 27     Family History   Problem Relation Age of Onset    Diabetes Mother     Stroke Mother     No Known Problems Father     No Known Problems Sister     No Known Problems Daughter     No Known Problems Brother     No Known Problems Brother     No Known Problems Brother     No Known Problems Half-Sister        Objective:  /60 (BP Location: Left arm, Patient Position: Sitting, Cuff Size: Standard)   Pulse 81   Temp 98 °F (36 7 °C) (Temporal)   Ht 5' 7 5" (1 715 m)   Wt 94 8 kg (209 lb)   SpO2 98%   BMI 32 25 kg/m²        Physical Exam  Vitals reviewed  Constitutional:       General: She is not in acute distress  HENT:      Head: Normocephalic and atraumatic        Right Ear: Tympanic membrane, ear canal and external ear normal       Left Ear: Tympanic membrane, ear canal and external ear normal       Nose: Nose normal    Eyes:      General:         Right eye: No discharge  Left eye: No discharge  Extraocular Movements: Extraocular movements intact  Conjunctiva/sclera: Conjunctivae normal       Pupils: Pupils are equal, round, and reactive to light  Cardiovascular:      Rate and Rhythm: Normal rate and regular rhythm  Pulses: Normal pulses  Pulmonary:      Effort: Pulmonary effort is normal  No respiratory distress  Breath sounds: Normal breath sounds  No wheezing or rales  Abdominal:      General: Bowel sounds are normal  There is no distension  Musculoskeletal:         General: Normal range of motion  Cervical back: Normal range of motion  Right lower leg: No edema  Left lower leg: No edema  Lymphadenopathy:      Cervical: No cervical adenopathy  Skin:     General: Skin is warm and dry  Findings: No erythema or rash  Neurological:      General: No focal deficit present  Mental Status: She is alert and oriented to person, place, and time  Cranial Nerves: No cranial nerve deficit     Psychiatric:         Mood and Affect: Mood normal

## 2021-09-21 PROCEDURE — 4010F ACE/ARB THERAPY RXD/TAKEN: CPT | Performed by: PHYSICIAN ASSISTANT

## 2021-10-19 ENCOUNTER — IMMUNIZATIONS (OUTPATIENT)
Dept: INTERNAL MEDICINE CLINIC | Age: 66
End: 2021-10-19
Payer: COMMERCIAL

## 2021-10-19 DIAGNOSIS — Z23 NEED FOR INFLUENZA VACCINATION: Primary | ICD-10-CM

## 2021-10-19 PROCEDURE — 90471 IMMUNIZATION ADMIN: CPT

## 2021-10-19 PROCEDURE — 90662 IIV NO PRSV INCREASED AG IM: CPT

## 2021-11-04 LAB
25(OH)D3 SERPL-MCNC: 35 NG/ML (ref 30–100)
ALBUMIN SERPL-MCNC: 4.3 G/DL (ref 3.6–5.1)
ALBUMIN/GLOB SERPL: 1.4 (CALC) (ref 1–2.5)
ALP SERPL-CCNC: 53 U/L (ref 37–153)
ALT SERPL-CCNC: 18 U/L (ref 6–29)
AST SERPL-CCNC: 17 U/L (ref 10–35)
BASOPHILS # BLD AUTO: 43 CELLS/UL (ref 0–200)
BASOPHILS NFR BLD AUTO: 0.7 %
BILIRUB SERPL-MCNC: 0.5 MG/DL (ref 0.2–1.2)
BUN SERPL-MCNC: 13 MG/DL (ref 7–25)
BUN/CREAT SERPL: ABNORMAL (CALC) (ref 6–22)
CALCIUM SERPL-MCNC: 9.9 MG/DL (ref 8.6–10.4)
CHLORIDE SERPL-SCNC: 105 MMOL/L (ref 98–110)
CHOLEST SERPL-MCNC: 176 MG/DL
CHOLEST/HDLC SERPL: 2.5 (CALC)
CO2 SERPL-SCNC: 30 MMOL/L (ref 20–32)
CREAT SERPL-MCNC: 0.9 MG/DL (ref 0.5–0.99)
EOSINOPHIL # BLD AUTO: 464 CELLS/UL (ref 15–500)
EOSINOPHIL NFR BLD AUTO: 7.6 %
ERYTHROCYTE [DISTWIDTH] IN BLOOD BY AUTOMATED COUNT: 13.1 % (ref 11–15)
EST. AVERAGE GLUCOSE BLD GHB EST-MCNC: 131 (CALC)
EST. AVERAGE GLUCOSE BLD GHB EST-SCNC: 7.3 (CALC)
FERRITIN SERPL-MCNC: 64 NG/ML (ref 16–288)
GLOBULIN SER CALC-MCNC: 3 G/DL (CALC) (ref 1.9–3.7)
GLUCOSE SERPL-MCNC: 109 MG/DL (ref 65–99)
HBA1C MFR BLD: 6.2 % OF TOTAL HGB
HCT VFR BLD AUTO: 45.4 % (ref 35–45)
HDLC SERPL-MCNC: 71 MG/DL
HGB BLD-MCNC: 15.5 G/DL (ref 11.7–15.5)
LDLC SERPL CALC-MCNC: 90 MG/DL (CALC)
LYMPHOCYTES # BLD AUTO: 1366 CELLS/UL (ref 850–3900)
LYMPHOCYTES NFR BLD AUTO: 22.4 %
MCH RBC QN AUTO: 29.6 PG (ref 27–33)
MCHC RBC AUTO-ENTMCNC: 34.1 G/DL (ref 32–36)
MCV RBC AUTO: 86.6 FL (ref 80–100)
MONOCYTES # BLD AUTO: 390 CELLS/UL (ref 200–950)
MONOCYTES NFR BLD AUTO: 6.4 %
NEUTROPHILS # BLD AUTO: 3837 CELLS/UL (ref 1500–7800)
NEUTROPHILS NFR BLD AUTO: 62.9 %
NONHDLC SERPL-MCNC: 105 MG/DL (CALC)
PLATELET # BLD AUTO: 283 THOUSAND/UL (ref 140–400)
PMV BLD REES-ECKER: 10.7 FL (ref 7.5–12.5)
POTASSIUM SERPL-SCNC: 4.6 MMOL/L (ref 3.5–5.3)
PROT SERPL-MCNC: 7.3 G/DL (ref 6.1–8.1)
RBC # BLD AUTO: 5.24 MILLION/UL (ref 3.8–5.1)
SL AMB EGFR AFRICAN AMERICAN: 77 ML/MIN/1.73M2
SL AMB EGFR NON AFRICAN AMERICAN: 66 ML/MIN/1.73M2
SODIUM SERPL-SCNC: 142 MMOL/L (ref 135–146)
TRIGL SERPL-MCNC: 68 MG/DL
TSH SERPL-ACNC: 1.16 MIU/L (ref 0.4–4.5)
WBC # BLD AUTO: 6.1 THOUSAND/UL (ref 3.8–10.8)

## 2021-11-19 ENCOUNTER — TELEPHONE (OUTPATIENT)
Dept: GASTROENTEROLOGY | Facility: CLINIC | Age: 66
End: 2021-11-19

## 2021-12-02 ENCOUNTER — CONSULT (OUTPATIENT)
Dept: INTERNAL MEDICINE CLINIC | Age: 66
End: 2021-12-02
Payer: COMMERCIAL

## 2021-12-02 VITALS
SYSTOLIC BLOOD PRESSURE: 104 MMHG | WEIGHT: 207 LBS | HEIGHT: 67 IN | HEART RATE: 76 BPM | OXYGEN SATURATION: 97 % | BODY MASS INDEX: 32.49 KG/M2 | TEMPERATURE: 96.6 F | DIASTOLIC BLOOD PRESSURE: 74 MMHG

## 2021-12-02 DIAGNOSIS — H02.402 PTOSIS OF LEFT EYELID: ICD-10-CM

## 2021-12-02 DIAGNOSIS — R73.01 IMPAIRED FASTING BLOOD SUGAR: ICD-10-CM

## 2021-12-02 DIAGNOSIS — I10 ESSENTIAL HYPERTENSION: ICD-10-CM

## 2021-12-02 DIAGNOSIS — N32.81 OAB (OVERACTIVE BLADDER): ICD-10-CM

## 2021-12-02 DIAGNOSIS — Z01.818 PRE-OP EXAM: Primary | ICD-10-CM

## 2021-12-02 DIAGNOSIS — M25.561 ACUTE PAIN OF RIGHT KNEE: ICD-10-CM

## 2021-12-02 DIAGNOSIS — I45.10 RBBB: ICD-10-CM

## 2021-12-02 PROCEDURE — 1036F TOBACCO NON-USER: CPT | Performed by: PHYSICIAN ASSISTANT

## 2021-12-02 PROCEDURE — 3008F BODY MASS INDEX DOCD: CPT | Performed by: PHYSICIAN ASSISTANT

## 2021-12-02 PROCEDURE — 93000 ELECTROCARDIOGRAM COMPLETE: CPT | Performed by: PHYSICIAN ASSISTANT

## 2021-12-02 PROCEDURE — 3078F DIAST BP <80 MM HG: CPT | Performed by: PHYSICIAN ASSISTANT

## 2021-12-02 PROCEDURE — 99213 OFFICE O/P EST LOW 20 MIN: CPT | Performed by: PHYSICIAN ASSISTANT

## 2021-12-02 PROCEDURE — 3074F SYST BP LT 130 MM HG: CPT | Performed by: PHYSICIAN ASSISTANT

## 2021-12-02 RX ORDER — SOLIFENACIN SUCCINATE 5 MG/1
5 TABLET, FILM COATED ORAL DAILY
Qty: 90 TABLET | Refills: 1 | Status: SHIPPED | OUTPATIENT
Start: 2021-12-02 | End: 2022-05-12 | Stop reason: SDUPTHER

## 2021-12-02 RX ORDER — TRIAMCINOLONE ACETONIDE 1 MG/G
CREAM TOPICAL
COMMUNITY
Start: 2021-11-02 | End: 2022-07-21

## 2022-01-06 DIAGNOSIS — R73.01 IMPAIRED FASTING GLUCOSE: ICD-10-CM

## 2022-01-07 ENCOUNTER — TELEPHONE (OUTPATIENT)
Dept: INTERNAL MEDICINE CLINIC | Age: 67
End: 2022-01-07

## 2022-01-07 RX ORDER — SITAGLIPTIN AND METFORMIN HYDROCHLORIDE 1000; 50 MG/1; MG/1
1 TABLET, FILM COATED ORAL 2 TIMES DAILY
Qty: 180 TABLET | Refills: 0 | Status: SHIPPED | OUTPATIENT
Start: 2022-01-07 | End: 2022-05-12 | Stop reason: SDUPTHER

## 2022-01-21 ENCOUNTER — OFFICE VISIT (OUTPATIENT)
Dept: OBGYN CLINIC | Facility: CLINIC | Age: 67
End: 2022-01-21
Payer: COMMERCIAL

## 2022-01-21 ENCOUNTER — APPOINTMENT (OUTPATIENT)
Dept: RADIOLOGY | Facility: CLINIC | Age: 67
End: 2022-01-21
Payer: COMMERCIAL

## 2022-01-21 VITALS
WEIGHT: 206.4 LBS | DIASTOLIC BLOOD PRESSURE: 82 MMHG | SYSTOLIC BLOOD PRESSURE: 126 MMHG | HEART RATE: 79 BPM | HEIGHT: 67 IN | BODY MASS INDEX: 32.39 KG/M2

## 2022-01-21 DIAGNOSIS — M25.561 RIGHT KNEE PAIN, UNSPECIFIED CHRONICITY: ICD-10-CM

## 2022-01-21 DIAGNOSIS — Z01.89 ENCOUNTER FOR LOWER EXTREMITY COMPARISON IMAGING STUDY: ICD-10-CM

## 2022-01-21 DIAGNOSIS — M17.11 PRIMARY OSTEOARTHRITIS OF RIGHT KNEE: Primary | ICD-10-CM

## 2022-01-21 DIAGNOSIS — M25.561 CHRONIC PAIN OF RIGHT KNEE: ICD-10-CM

## 2022-01-21 DIAGNOSIS — G89.29 CHRONIC PAIN OF RIGHT KNEE: ICD-10-CM

## 2022-01-21 PROCEDURE — 3074F SYST BP LT 130 MM HG: CPT | Performed by: ORTHOPAEDIC SURGERY

## 2022-01-21 PROCEDURE — 3079F DIAST BP 80-89 MM HG: CPT | Performed by: ORTHOPAEDIC SURGERY

## 2022-01-21 PROCEDURE — 99204 OFFICE O/P NEW MOD 45 MIN: CPT | Performed by: ORTHOPAEDIC SURGERY

## 2022-01-21 PROCEDURE — 1036F TOBACCO NON-USER: CPT | Performed by: ORTHOPAEDIC SURGERY

## 2022-01-21 PROCEDURE — 3008F BODY MASS INDEX DOCD: CPT | Performed by: ORTHOPAEDIC SURGERY

## 2022-01-21 PROCEDURE — 73562 X-RAY EXAM OF KNEE 3: CPT

## 2022-01-21 PROCEDURE — 73560 X-RAY EXAM OF KNEE 1 OR 2: CPT

## 2022-01-21 PROCEDURE — 1160F RVW MEDS BY RX/DR IN RCRD: CPT | Performed by: ORTHOPAEDIC SURGERY

## 2022-01-21 RX ORDER — CLONAZEPAM 1 MG/1
TABLET ORAL
COMMUNITY
Start: 2022-01-16

## 2022-01-21 NOTE — PROGRESS NOTES
nurseAssessment/Plan:  1  Primary osteoarthritis of right knee  XR knee 1 or 2 vw left    Durable Medical Equipment    Ambulatory Referral to Physical Therapy   2  Chronic pain of right knee  XR knee 3 vw right non injury    Durable Medical Equipment    Ambulatory Referral to Physical Therapy       Scribe Attestation    I,:  Adriana Olea am acting as a scribe while in the presence of the attending physician :       I,:  Vito Cooney MD personally performed the services described in this documentation    as scribed in my presence :         Amena Wilson is a pleasant 78year old who presents to the office today for an initial evaluation of her right knee  Upon review of the right knee x-ray's, a thorough history and my examination, Amena Wilson is presenting with signs and symptoms consistent with right knee severe medial and moderate patellofemoral compartmental osteoarthritis  Non-operative treatments were discussed with the patient in the forms of oral anti-inflammatories, bracing, formal physical therapy, physician directed home exercise program, corticosteroid injections and gel-lubricant injections  I discussed with the patient that I encourage her to remain active but should refrain from any high impact activities  A script for physical therapy was provided to the patient at today's visit  She was fitted for a right hinge knee brace at today's visit and was instructed to wear the brace when active  I discussed with the patient that she may take glucosamine chondroitin which can help with pain relief  I will follow-up with her in 6 weeks for a clinical re-evaluation  She understood and had no further questions  Subjective:   Patient ID: Lory De Anda is a 78 y o  female who presents to the office today for an initial evaluation of her right knee  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   She states that she is active and attends the gym on a regular basis  She states that her right knee pain began about 3 months ago with no specific mechanism of injury or trauma  She states that she will experience a daily constant pain which fluctuates between dull during the day and sharp at night  She localizes her pain over the medial aspect of her right knee  She states that her pain is exacerbated with ambulating up and down stairs  She states that she feels that her knee will give out on her  She has used Tylenol and icy Hot to help alleviate her pain  She denies any previous treatment in the forms of formal physical therapy or corticosteroid injections  She states about 30 years ago she her her right knee when performing leg lifts with heavy weight but sought no treatment  Review of Systems   Constitutional: Positive for activity change  Negative for chills, fever and unexpected weight change  HENT: Negative for hearing loss, nosebleeds and sore throat  Eyes: Negative for pain, redness and visual disturbance  Respiratory: Negative for cough, shortness of breath and wheezing  Cardiovascular: Negative for chest pain, palpitations and leg swelling  Gastrointestinal: Negative for abdominal pain, nausea and vomiting  Endocrine: Negative for polyphagia and polyuria  Genitourinary: Negative for dysuria and hematuria  Musculoskeletal:        As noted in HPI   Skin: Negative for rash and wound  Neurological: Negative for dizziness, numbness and headaches  Psychiatric/Behavioral: Negative for decreased concentration and suicidal ideas  The patient is not nervous/anxious            Past Medical History:   Diagnosis Date    Acquired acanthosis nigricans     Anxiety     Depression 10/3/2017    Essential hypertension     HLD (hyperlipidemia)     Hypertension     Prediabetes     Type 2 diabetes mellitus without complication ([facility] [address] )     last assessed: 10/03/2017    Type 2 diabetes mellitus without complication, without long-term current use of insulin ([facility] [address] ) 3/4/2021       Past Surgical History:   Procedure Laterality Date    APPENDECTOMY      TOTAL ABDOMINAL HYSTERECTOMY  1989    age 27   [facility] STUDY  8/2/2018       Family History   Problem Relation Age of Onset    Diabetes Mother     Stroke Mother     No Known Problems Father     No Known Problems Sister     No Known Problems Daughter     No Known Problems Brother     No Known Problems Brother     No Known Problems Brother     No Known Problems Half-Sister        Social History     Occupational History    Not on file   Tobacco Use    Smoking status: Never Smoker    Smokeless tobacco: Never Used   Vaping Use    Vaping Use: Never used   Substance and Sexual Activity    Alcohol use: No    Drug use: No    Sexual activity: Yes         Current Outpatient Medications:     amLODIPine (NORVASC) 10 mg tablet, Take 1 tablet (10 mg total) by mouth daily, Disp: 90 tablet, Rfl: 1    ammonium lactate (LAC-HYDRIN) 12 % lotion, Apply topically daily as needed for dry skin, Disp: 400 g, Rfl: 0    Azelaic Acid 15 % cream, Apply 1 application topically 2 (two) times a day To affected area, Disp: , Rfl:     clonazePAM (KlonoPIN) 1 mg tablet, TAKE 1 TABLET BY MOUTH ONCE DAILY AS NEEDED FOR ANXIETY, Disp: , Rfl:     clonazePAM (KlonoPIN) 2 mg tablet, Take 2 mg by mouth as needed (1/2 tablet as needed) , Disp: , Rfl:     Dermatological Products, Misc  (EPICERAM) lotion, APPLY TO THE AFFECTED AREA(S) 1 TO 2 TIMES DAILY, Disp: , Rfl:     eszopiclone (LUNESTA) tablet, Take 3 mg by mouth daily at bedtime as needed, Disp: , Rfl: 0    ZpTlr-EcJvrv-HM-B Cmp-C-Biot (INTEGRA PLUS) CAPS, Take 1 tablet by mouth daily, Disp: , Rfl: 0    hydroquinone 4 % cream, as needed  , Disp: , Rfl: 0    rosuvastatin (CRESTOR) 10 MG tablet, TAKE 1 TABLET BY MOUTH MONDAY, WEDNESDAY, AND FRIDAY, Disp: 36 tablet, Rfl: 5    sitaGLIPtin-metFORMIN (Janumet)  MG per tablet, Take 1 tablet by mouth 2 (two) times a day, Disp: 180 tablet, Rfl: 0    solifenacin (VESICARE) 5 mg tablet, Take 1 tablet (5 mg total) by mouth daily, Disp: 90 tablet, Rfl: 1    tazarotene (AVAGE) 0 1 % cream, Apply topically daily , Disp: , Rfl: 0    triamcinolone (KENALOG) 0 1 % cream, APPLY EVERY MORNING TO [place] UP TO 2 WEEKS/MONTH AS NEEDED ON BODY, Disp: , Rfl:     valsartan (DIOVAN) 80 mg tablet, Take 1 tablet (80 mg total) by mouth daily, Disp: 90 tablet, Rfl: 1    WELLBUTRIN  MG 24 hr tablet, Take 1 tablet by mouth daily, Disp: , Rfl: 0    Allergies   Allergen Reactions    Escitalopram Rash    Sulfa Antibiotics Hives and Rash    Food Other (See Comments)     Annotation - [id number]: CORN - causes extreme sleepiness       Objective:  Vitals:    01/21/22 1041   BP: 126/82   Pulse: 79       Right Knee Exam     Tenderness   The patient is experiencing tenderness in the medial joint line  Range of Motion   Extension: 0   Flexion: 120     Tests   Varus: negative Valgus: positive    Other   Erythema: absent  Scars: absent  Sensation: normal  Pulse: present  Swelling: mild  Effusion: no effusion present    Comments:  Parapatellar crepitus appreciated  Neurovascularly intact               Physical Exam  Vitals reviewed  Constitutional:       Appearance: Normal appearance  She is well-developed  HENT:      Head: Normocephalic and atraumatic  Eyes:      General:         Right eye: No discharge  Left eye: No discharge  Extraocular Movements: Extraocular movements intact  Conjunctiva/sclera: Conjunctivae normal    Cardiovascular:      Rate and Rhythm: Normal rate  Pulmonary:      Effort: Pulmonary effort is normal  No respiratory distress  Musculoskeletal:      Cervical back: Normal range of motion and neck supple  Right knee: No effusion  Skin:     General: Skin is warm and dry  Neurological:      General: No focal deficit present        Mental Status: She is alert and oriented to person, place, and time  Psychiatric:         Mood and Affect: Mood normal          Behavior: Behavior normal          I have personally reviewed pertinent films in PACS and my interpretation is as follows:  X-rays performed in the office today of her right knee demonstrates severe medial and moderate patellofemoral compartmental osteoarthritis  X-ray's demonstrates joint space narrowing, sclerosis, osteophytes and subchondral cysts  There are no acute fractures, dislocations, lytic or blastic lesions

## 2022-02-02 ENCOUNTER — EVALUATION (OUTPATIENT)
Dept: PHYSICAL THERAPY | Facility: CLINIC | Age: 67
End: 2022-02-02
Payer: COMMERCIAL

## 2022-02-02 DIAGNOSIS — M25.561 CHRONIC PAIN OF RIGHT KNEE: Primary | ICD-10-CM

## 2022-02-02 DIAGNOSIS — G89.29 CHRONIC PAIN OF RIGHT KNEE: Primary | ICD-10-CM

## 2022-02-02 PROCEDURE — 97162 PT EVAL MOD COMPLEX 30 MIN: CPT | Performed by: PHYSICAL THERAPIST

## 2022-02-02 PROCEDURE — 97110 THERAPEUTIC EXERCISES: CPT | Performed by: PHYSICAL THERAPIST

## 2022-02-02 NOTE — PROGRESS NOTES
PT Evaluation     Today's date: 2022  Patient name: Orlin Padilla  : 1955  MRN: [id number]  Referring provider: Silvina Cortez*  Dx: No diagnosis found  Assessment  Assessment details: Orlin Padilla is a 78 y o  female with R knee pain due to DJD  She presents with pain, decreased strength, decreased ROM, and ambulatory dysfunction  Due to these impairments, Patient has difficulty performing a/iadls, recreational activities and engaging in social activities  Patient's clinical presentation is consistent with their referring diagnosis  Patient would benefit from skilled physical therapy to address their aforementioned impairments, improve their level of function and to improve their overall quality of life   has been given a home exercise program and is in agreement with the plan of care  Thank you for your referral   Impairments: abnormal or restricted ROM, impaired physical strength, lacks appropriate home exercise program, pain with function and weight-bearing intolerance    Symptom irritability: lowUnderstanding of Dx/Px/POC: excellent  Goals  ST Goals - 2-4 weeks  1  Patient will report decreased pain with activity by at least 2 points within 4 weeks  2  Patient will improve ROM 5-10 degrees within 4 weeks  3  Patient will demonstrate ability to actively correct posture without cueing within 4 weeks  4  Patient will perform IADLs without pain in 2 weeks  5  Patient will increase strength by 25% in 4 weeks    LT Goals - Discharge  1  Patient will improve FOTO score to maximum stated or greater by discharge  2  Patient will return to preferred recreational activity without significant pain increase by discharge   3    Patient will return to all work related activities without pain by discharge     Plan  Patient would benefit from: skilled physical therapy  Referral necessary: No        Subjective Evaluation    History of Present Illness  Mechanism of injury: Patient reports that 20 years ago she began having pain in the knee when she did some leg raises at the gym  Those sx had resolved  She reports that she had a  who worked her hard and her knee tolerated that activity (2019)  She states that approx 3 months ago she was working out at [employer] again and then she began having pain again in the knee  CC:  She states that she frequently has pain in the night  Pain wakes her at night everynight  She reports that she wakes with pain and it does not change during the day  Patient indicates medial knee pain  Function:  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  She is wearing a brace which she wears daily  Recurrent probem    Quality of life: excellent    Pain  Current pain ratin  At worst pain ratin  Relieving factors: medications  Aggravating factors: walking, standing and stair climbing  Progression: worsening    Social Support  Steps to enter house: yes  Stairs in house: yes   Lives with: alone    Employment status: working    Diagnostic Tests  X-ray: abnormal  Patient Goals  Patient goals for therapy: decreased edema, decreased pain, increased motion, return to work, return to [employer] activities, independence with ADLs/IADLs and increased strength          Objective     Observations     Right Knee   Positive for effusion  Palpation     Additional Palpation Details  (+) R Pez Anserine Bursitis      Active Range of Motion   Left Knee   Flexion: 135 degrees   Extension: 0 degrees   Extensor la degrees     Right Knee   Flexion: 120 degrees   Extension: 7 degrees   Extensor la degrees     Mobility   Patellar Mobility:   Left Knee   WFL: medial, lateral, superior and inferior       Right Knee   Hypomobile: medial, lateral, superior and inferior     Patellar Static Positioning   Right Knee: lateral tilt    Strength/Myotome Testing     Left Knee   Flexion: 5  Extension: 5    Right Knee   Flexion: 4  Extension: 4  Quadriceps contraction: fair    Additional Strength Details  Hip Strength:   4/5 on R    Swelling     Left Knee Girth Measurement (cm)   Joint line: 45 cm  10 cm above joint line: 49 5 cm    Right Knee Girth Measurement (cm)   Joint line: 46 cm  10 cm above joint line: 50 cm    Functional Assessment        Single Leg Stance - Eyes Open   Left  Trial 1: 5 seconds  Trial 2: 10 seconds    Right  Trial 1: 5 seconds  Trial 2: 6 seconds  Trial 3: 10 seconds  Average: 7 seconds              Precautions: HTN      Manuals 2/2                         JM patella                                       Neuro Re-Ed                                                                                                        Ther Ex                          Bike nv                         QS 10 x :05            SLR 10 x :03            Hip ABD s/l 10 x :03            clamshells nv            Heel slides nv            Ther Activity                                       Gait Training                                       Modalities

## 2022-02-04 PROCEDURE — 4010F ACE/ARB THERAPY RXD/TAKEN: CPT | Performed by: ORTHOPAEDIC SURGERY

## 2022-02-09 ENCOUNTER — OFFICE VISIT (OUTPATIENT)
Dept: PHYSICAL THERAPY | Facility: CLINIC | Age: 67
End: 2022-02-09
Payer: COMMERCIAL

## 2022-02-09 DIAGNOSIS — G89.29 CHRONIC PAIN OF RIGHT KNEE: Primary | ICD-10-CM

## 2022-02-09 DIAGNOSIS — M25.561 CHRONIC PAIN OF RIGHT KNEE: Primary | ICD-10-CM

## 2022-02-09 PROCEDURE — 97110 THERAPEUTIC EXERCISES: CPT

## 2022-02-09 NOTE — PROGRESS NOTES
Daily Note     Today's date: 2022  Patient name: Haylee Mora  : 1955  MRN: [id number]  Referring provider: Ruben Rodriguez*  Dx: No diagnosis found  Subjective: Patient states that she has remained compliant with HEP and has noticed an improvement with pain during the day and at night     Objective: See treatment diary below      Assessment: Tolerated treatment well  Patient exhibited good technique with therapeutic exercises      Plan: Continue per plan of care        Precautions: HTN        Manuals                         JM patella  5 MW                                     Neuro Re-Ed                                                                                                        Ther Ex                          Bike nv 6 min                         QS 10 x :05            SLR 10 x :03            Hip ABD s/l 10 x :03            clamshells nv RTB 5" x 10            Heel slides nv 10" x 10           Side stepping   RTB x 5 laps            TM backward walking                                        Ther Activity                                       Gait Training                                       Modalities

## 2022-02-14 ENCOUNTER — OFFICE VISIT (OUTPATIENT)
Dept: PHYSICAL THERAPY | Facility: CLINIC | Age: 67
End: 2022-02-14
Payer: COMMERCIAL

## 2022-02-14 DIAGNOSIS — M25.561 CHRONIC PAIN OF RIGHT KNEE: Primary | ICD-10-CM

## 2022-02-14 DIAGNOSIS — G89.29 CHRONIC PAIN OF RIGHT KNEE: Primary | ICD-10-CM

## 2022-02-14 PROCEDURE — 97110 THERAPEUTIC EXERCISES: CPT

## 2022-02-14 NOTE — PROGRESS NOTES
Daily Note     Today's date: 2022  Patient name: Katarina Mcmillan  : 1955  MRN: [id number]  Referring provider: Ankur Chi*  Dx:   Encounter Diagnosis     ICD-10-CM    1  Chronic pain of right knee  M25 561     G89 29                   Subjective: Patient reports having less sleep issues, she also notes she likes using heat more than cold for pain relief  Objective: See treatment diary below      Assessment: Tolerated treatment well  Reports compliance with HEP  Good quad activation with quad set and able to maintain knee ext during SLR  Patient would benefit from continued PT      Plan: Continue per plan of care        Precautions: HTN        Manuals                        JM patella  5 MW                                     Neuro Re-Ed                                                                                                        Ther Ex                          Bike nv 6 min  6'                       QS 10 x :05  10x :05          SLR 10 x :03  10x :03           Hip ABD s/l 10 x :03            clamshells nv RTB 5" x 10  RTB  10x :05           Heel slides nv 10" x 10 10x :10           Side stepping   RTB x 5 laps  RTB 6 laps          TM backward walking                                        Ther Activity                                       Gait Training                                       Modalities

## 2022-02-17 ENCOUNTER — OFFICE VISIT (OUTPATIENT)
Dept: PHYSICAL THERAPY | Facility: CLINIC | Age: 67
End: 2022-02-17
Payer: COMMERCIAL

## 2022-02-17 DIAGNOSIS — M25.561 CHRONIC PAIN OF RIGHT KNEE: Primary | ICD-10-CM

## 2022-02-17 DIAGNOSIS — G89.29 CHRONIC PAIN OF RIGHT KNEE: Primary | ICD-10-CM

## 2022-02-17 PROCEDURE — 97110 THERAPEUTIC EXERCISES: CPT

## 2022-02-17 NOTE — PROGRESS NOTES
Daily Note     Today's date: 2022  Patient name: Caleb Carlos  : 1955  MRN: [id number]  Referring provider: Silverio Casper*  Dx:   Encounter Diagnosis     ICD-10-CM    1  Chronic pain of right knee  M25 561     G89 29                   Subjective: patient states that she has noticed an improvement with pain since starting therapy  Continues to wake up with some pain in R knee at night  Objective: See treatment diary below      Assessment: Tolerated treatment well  Patient exhibited good technique with therapeutic exercises      Plan: Continue per plan of care        Precautions: HTN        Manuals                        JM patella  5 MW  5 MW                                   Neuro Re-Ed                                                                                                        Ther Ex                          Bike nv 6 min  6' 6         SLS    10" x 5  Foam         QS 10 x :05  10x :05          SLR 10 x :03  10x :03  1# 1 x 10         Hip ABD s/l 10 x :03   1# x 10          clamshells nv RTB 5" x 10  RTB  10x :05  GTB 2 x 10          Heel slides nv 10" x 10 10x :10  10" x 10          Side stepping   RTB x 5 laps  RTB 6 laps RTB 6 laps         TM backward walking              Total gym squats     lvl 18 2 x 20                      Ther Activity                                       Gait Training                                       Modalities

## 2022-02-21 ENCOUNTER — OFFICE VISIT (OUTPATIENT)
Dept: PHYSICAL THERAPY | Facility: CLINIC | Age: 67
End: 2022-02-21
Payer: COMMERCIAL

## 2022-02-21 DIAGNOSIS — M25.561 CHRONIC PAIN OF RIGHT KNEE: Primary | ICD-10-CM

## 2022-02-21 DIAGNOSIS — G89.29 CHRONIC PAIN OF RIGHT KNEE: Primary | ICD-10-CM

## 2022-02-21 PROCEDURE — 97112 NEUROMUSCULAR REEDUCATION: CPT | Performed by: PHYSICAL THERAPIST

## 2022-02-21 PROCEDURE — 97110 THERAPEUTIC EXERCISES: CPT | Performed by: PHYSICAL THERAPIST

## 2022-02-21 NOTE — PROGRESS NOTES
Daily Note     Today's date: 2022  Patient name: Kian Veras  : 1955  MRN: [id number]  Referring provider: Gemini Yoder*  Dx:   Encounter Diagnosis     ICD-10-CM    1  Chronic pain of right knee  M25 561     G89 29                   Subjective: Patient reports that her knee has been feeling well  Objective: See treatment diary below      Assessment: Tolerated treatment well  Patient would benefit from continued PT      Plan: Continue per plan of care        Precautions: HTN        Manuals                      JM patella  5 MW  5 MW 3'                                  Neuro Re-Ed                                                                                                        Ther Ex                          Bike nv 6 min  6' 6 6        SLS    10" x 5  Foam         QS 10 x :05  10x :05  dc        SLR 10 x :03  10x :03  1# 1 x 10 1 5#  X 10        Hip ABD s/l 10 x :03   1# x 10  1 5# x 10        clamshells nv RTB 5" x 10  RTB  10x :05  GTB 2 x 10  GTB 2 x 10        Heel slides nv 10" x 10 10x :10  10" x 10  dc        Side stepping   RTB x 5 laps  RTB 6 laps RTB 6 laps GTB 6 laps        TM backward walking              Total gym squats     lvl 18 2 x 20 lvl 22  2 x 20        Standing hip 3 way     nv        Ther Activity                                       Gait Training                                       Modalities

## 2022-02-23 ENCOUNTER — OFFICE VISIT (OUTPATIENT)
Dept: PHYSICAL THERAPY | Facility: CLINIC | Age: 67
End: 2022-02-23
Payer: COMMERCIAL

## 2022-02-23 DIAGNOSIS — M25.561 CHRONIC PAIN OF RIGHT KNEE: Primary | ICD-10-CM

## 2022-02-23 DIAGNOSIS — G89.29 CHRONIC PAIN OF RIGHT KNEE: Primary | ICD-10-CM

## 2022-02-23 PROCEDURE — 97110 THERAPEUTIC EXERCISES: CPT | Performed by: PHYSICAL THERAPIST

## 2022-02-23 PROCEDURE — 97140 MANUAL THERAPY 1/> REGIONS: CPT | Performed by: PHYSICAL THERAPIST

## 2022-02-23 PROCEDURE — 97112 NEUROMUSCULAR REEDUCATION: CPT | Performed by: PHYSICAL THERAPIST

## 2022-02-23 NOTE — PROGRESS NOTES
Daily Note     Today's date: 2022  Patient name: Haylee Mora  : 1955  MRN: [id number]  Referring provider: Ruben Rodriguez*  Dx:   Encounter Diagnosis     ICD-10-CM    1  Chronic pain of right knee  M25 561     G89 29                   Subjective: Patient reports having minor soreness in the medial patellar region yesterday which resolved  Objective: See treatment diary below      Assessment: Tolerated treatment well  Patient would benefit from continued PT      Plan: Continue per plan of care        Precautions: HTN        Manuals                     JM patella  5 MW  5 MW 3' 3'                                 Neuro Re-Ed                                                                                                        Ther Ex                          Bike nv 6 min  6' 6 6 7'       SLS    10" x 5  Foam  foam       QS 10 x :05  10x :05  dc        SLR 10 x :03  10x :03  1# 1 x 10 1 5#  X 10 1 5# x 10       Hip ABD s/l 10 x :03   1# x 10  1 5# x 10 1 5# x 10       clamshells nv RTB 5" x 10  RTB  10x :05  GTB 2 x 10  GTB 2 x 10 GTB 2 x 10       Heel slides nv 10" x 10 10x :10  10" x 10  dc        Side stepping   RTB x 5 laps  RTB 6 laps RTB 6 laps GTB 6 laps GTB x 6 laps       TM backward walking              Total gym squats     lvl 18 2 x 20 lvl 22  2 x 20 lvl 22 2 x 20       Standing hip 3 way     nv Hip ABD/Flex       Ther Activity                                       Gait Training                                       Modalities

## 2022-02-28 ENCOUNTER — OFFICE VISIT (OUTPATIENT)
Dept: PHYSICAL THERAPY | Facility: CLINIC | Age: 67
End: 2022-02-28
Payer: COMMERCIAL

## 2022-02-28 DIAGNOSIS — M25.561 CHRONIC PAIN OF RIGHT KNEE: Primary | ICD-10-CM

## 2022-02-28 DIAGNOSIS — G89.29 CHRONIC PAIN OF RIGHT KNEE: Primary | ICD-10-CM

## 2022-02-28 PROCEDURE — 97110 THERAPEUTIC EXERCISES: CPT

## 2022-02-28 NOTE — PROGRESS NOTES
Daily Note     Today's date: 2022  Patient name: Naga Awan  : 1955  MRN: [id number]  Referring provider: Chantel Hastings*  Dx:   Encounter Diagnosis     ICD-10-CM    1  Chronic pain of right knee  M25 561     G89 29                   Subjective: Patient states that she has noticed an improvement with tolerance to activity  Objective: See treatment diary below      Assessment: Tolerated treatment well  Patient exhibited good technique with therapeutic exercises      Plan: Continue per plan of care        Precautions: HTN        Manuals                     JM patella  5 MW  5 MW 3' 3' 3                                Neuro Re-Ed                                                                                                        Ther Ex                          Bike nv 6 min  6' 6 6 7' 7      SLS    10" x 5  Foam  foam Foam 10" x 5                    SLR 10 x :03  10x :03  1# 1 x 10 1 5#  X 10 1 5# x 10 1 5# 2 x 10      Hip ABD s/l 10 x :03   1# x 10  1 5# x 10 1 5# x 10 1 5# x 20       clamshells nv RTB 5" x 10  RTB  10x :05  GTB 2 x 10  GTB 2 x 10 GTB 2 x 10       Leg press        120# 2 x 20       Side stepping   RTB x 5 laps  RTB 6 laps RTB 6 laps GTB 6 laps GTB x 6 laps GTB x 6 laps       TM backward walking              Total gym squats     lvl 18 2 x 20 lvl 22  2 x 20 lvl 22 2 x 20 lvl 22 x 20       Standing hip abd/ext     nv Hip ABD/Flex G x 20       Ther Activity                                       Gait Training                                       Modalities

## 2022-03-02 ENCOUNTER — OFFICE VISIT (OUTPATIENT)
Dept: PHYSICAL THERAPY | Facility: CLINIC | Age: 67
End: 2022-03-02
Payer: COMMERCIAL

## 2022-03-02 DIAGNOSIS — G89.29 CHRONIC PAIN OF RIGHT KNEE: Primary | ICD-10-CM

## 2022-03-02 DIAGNOSIS — M25.561 CHRONIC PAIN OF RIGHT KNEE: Primary | ICD-10-CM

## 2022-03-02 PROCEDURE — 97110 THERAPEUTIC EXERCISES: CPT | Performed by: PHYSICAL THERAPIST

## 2022-03-02 PROCEDURE — 97530 THERAPEUTIC ACTIVITIES: CPT | Performed by: PHYSICAL THERAPIST

## 2022-03-02 PROCEDURE — 97112 NEUROMUSCULAR REEDUCATION: CPT | Performed by: PHYSICAL THERAPIST

## 2022-03-02 NOTE — PROGRESS NOTES
Daily Note     Today's date: 3/2/2022  Patient name: Haylee Mora  : 1955  MRN: [id number]  Referring provider: Ruben Rodriguez*  Dx:   Encounter Diagnosis     ICD-10-CM    1  Chronic pain of right knee  M25 561     G89 29                   Subjective: Patient reports that she tripped and fell up her stairs  She hit her knees but has no pain, bruising or swelling  Objective: See treatment diary below      Assessment: Tolerated treatment well  Patient would benefit from continued PT      Plan: Continue per plan of care        Precautions: HTN        Manuals 2/2 2/9 2/14 2/17  2/21 2/23 2/28  3/2                  JM patella  5 MW  5 MW 3' 3' 3 3'                               Neuro Re-Ed                                                                                                        Ther Ex                          Bike nv 6 min  6' 6 6 7' 7 8'     SLS    10" x 5  Foam  foam Foam 10" x 5  10 x :10                  SLR 10 x :03  10x :03  1# 1 x 10 1 5#  X 10 1 5# x 10 1 5# 2 x 10 1 5# 2x10     Hip ABD s/l 10 x :03   1# x 10  1 5# x 10 1 5# x 10 1 5# x 20  1 5# 2x10     clamshells nv RTB 5" x 10  RTB  10x :05  GTB 2 x 10  GTB 2 x 10 GTB 2 x 10  GTB 2 x 10     Leg press        120# 2 x 20  120# 2 x 20     Side stepping   RTB x 5 laps  RTB 6 laps RTB 6 laps GTB 6 laps GTB x 6 laps GTB x 6 laps  GTB 6 laps     TM backward walking              Total gym squats     lvl 18 2 x 20 lvl 22  2 x 20 lvl 22 2 x 20 lvl 22 x 20  LVL 22 x 20     Standing hip abd/ext     nv Hip ABD/Flex G x 20  G x 20     Ther Activity                                       Gait Training                                       Modalities

## 2022-03-07 ENCOUNTER — EVALUATION (OUTPATIENT)
Dept: PHYSICAL THERAPY | Facility: CLINIC | Age: 67
End: 2022-03-07
Payer: COMMERCIAL

## 2022-03-07 DIAGNOSIS — M25.561 CHRONIC PAIN OF RIGHT KNEE: Primary | ICD-10-CM

## 2022-03-07 DIAGNOSIS — G89.29 CHRONIC PAIN OF RIGHT KNEE: Primary | ICD-10-CM

## 2022-03-07 PROCEDURE — 97110 THERAPEUTIC EXERCISES: CPT | Performed by: PHYSICAL THERAPIST

## 2022-03-07 PROCEDURE — 97112 NEUROMUSCULAR REEDUCATION: CPT | Performed by: PHYSICAL THERAPIST

## 2022-03-07 PROCEDURE — 97530 THERAPEUTIC ACTIVITIES: CPT | Performed by: PHYSICAL THERAPIST

## 2022-03-07 NOTE — PROGRESS NOTES
Daily Note     Today's date: 3/7/2022  Patient name: Tanvir Pope  : 1955  MRN: [id number]  Referring provider: Carlos Alberto Child*  Dx:   Encounter Diagnosis     ICD-10-CM    1  Chronic pain of right knee  M25 561     G89 29                   Subjective: Patient reports no knee pain at this time  Objective: See treatment diary below      Assessment: Tolerated treatment well  Patient would benefit from continued PT      Plan: Continue per plan of care        Precautions: HTN        Manuals 2/2 2/9 2/14 2/17  2/21 2/23 2/28  3/2 3/7                 JM patella  5 MW  5 MW 3' 3' 3 3'                               Neuro Re-Ed                                                                                                        Ther Ex                          Bike nv 6 min  6' 6 6 7' 7 8' 7    SLS    10" x 5  Foam  foam Foam 10" x 5  10 x :10 10 x :10                 SLR 10 x :03  10x :03  1# 1 x 10 1 5#  X 10 1 5# x 10 1 5# 2 x 10 1 5# 2x10 2# 2 x 10    Hip ABD s/l 10 x :03   1# x 10  1 5# x 10 1 5# x 10 1 5# x 20  1 5# 2x10 2# 2 x 10    clamshells nv RTB 5" x 10  RTB  10x :05  GTB 2 x 10  GTB 2 x 10 GTB 2 x 10  GTB 2 x 10 GTB 2 x 10    Leg press  -B       120# 2 x 20  120# 2 x 20 120#/135#  2 x 10    Side stepping   RTB x 5 laps  RTB 6 laps RTB 6 laps GTB 6 laps GTB x 6 laps GTB x 6 laps  GTB 6 laps GTB x 6    TM walking          5'    Total gym squats     lvl 18 2 x 20 lvl 22  2 x 20 lvl 22 2 x 20 lvl 22 x 20  LVL 22 x 20 LVL 22    Standing hip abd/ext     nv Hip ABD/Flex G x 20  G x 20 G x 20    LP uni          105# x B                                Gait Training                                       Modalities

## 2022-03-09 ENCOUNTER — OFFICE VISIT (OUTPATIENT)
Dept: PHYSICAL THERAPY | Facility: CLINIC | Age: 67
End: 2022-03-09
Payer: COMMERCIAL

## 2022-03-09 DIAGNOSIS — M25.561 CHRONIC PAIN OF RIGHT KNEE: Primary | ICD-10-CM

## 2022-03-09 DIAGNOSIS — G89.29 CHRONIC PAIN OF RIGHT KNEE: Primary | ICD-10-CM

## 2022-03-09 PROCEDURE — 97110 THERAPEUTIC EXERCISES: CPT | Performed by: PHYSICAL THERAPIST

## 2022-03-09 PROCEDURE — 97530 THERAPEUTIC ACTIVITIES: CPT | Performed by: PHYSICAL THERAPIST

## 2022-03-09 PROCEDURE — 97140 MANUAL THERAPY 1/> REGIONS: CPT | Performed by: PHYSICAL THERAPIST

## 2022-03-09 NOTE — PROGRESS NOTES
PT Re-evaluation     Today's date: 3/9/22  Patient name: Katarina Mcmillan  : 1955  MRN: [id number]  Referring provider: Ankur Chi  Dx: R knee DJD               [address] has been attending physical therapy for R knee pain due to DJD  She has made excellent progress in decreasing her pain levels and in improving strength, ROM, gait and balance  Overall she has made good progress functionally  She would continue to benefit from skilled physical therapy to maximize her functional benefits and abolish pain  Thank you for you referral       Assessment details: Katarina Mcmillan is a 78 y o  female with R knee pain due to DJD  She presents with pain, decreased strength, decreased ROM, and ambulatory dysfunction  Due to these impairments, Patient has difficulty performing a/iadls, recreational activities and engaging in social activities  Patient's clinical presentation is consistent with their referring diagnosis  Patient would benefit from skilled physical therapy to address their aforementioned impairments, improve their level of function and to improve their overall quality of life   has been given a home exercise program and is in agreement with the plan of care  Thank you for your referral   Impairments: abnormal or restricted ROM, impaired physical strength, lacks appropriate home exercise program, pain with function and weight-bearing intolerance    Symptom irritability: lowUnderstanding of Dx/Px/POC: excellent  Goals  ST Goals - 2-4 weeks  1  Patient will report decreased pain with activity by at least 2 points within 4 weeks -  Partially met  2  Patient will improve ROM 5-10 degrees within 4 weeks - met  3  Patient will demonstrate ability to actively correct ambulatory posture without cueing within 4 weeks - met  4  Patient will perform IADLs without pain in 2 weeks - partially met  5    Patient will increase strength by 25% in 4 weeks - met    LT Goals - Discharge  1  Patient will improve FOTO score to maximum stated or greater by discharge  2  Patient will return to preferred recreational activity without significant pain increase by discharge   3  Patient will return to all work related activities without pain by discharge     Plan  Patient would benefit from: skilled physical therapy  Referral necessary: No        Subjective Evaluation    History of Present Illness  Mechanism of injury: Patient reports that 20 years ago she began having pain in the knee when she did some leg raises at the gym  Those sx had resolved  She reports that she had a  who worked her hard and her knee tolerated that activity (2019)  She states that approx 3 months ago she was working out at [employer] again and then she began having pain again in the knee  CC:  She states that she frequently has pain in the night  Pain wakes her at night everynight  She reports that she wakes with pain and it does not change during the day  Patient indicates medial knee pain  Function:  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 and works on her feet in patient care  She is wearing a brace which she wears daily  CC:   Patient notes less pain overall  Recurrent probem    Quality of life: excellent    Pain  Current pain ratin    0 /10  At worst pain ratin   2/10  Relieving factors: medications  Aggravating factors: walking, standing and stair climbing  Progression: worsening  / improving    Social Support  Steps to enter house: yes  Stairs in house: yes   Lives with: alone    Employment status: working    Diagnostic Tests  X-ray: abnormal  Patient Goals  Patient goals for therapy: decreased edema, decreased pain, increased motion, return to work, return to [employer] activities, independence with ADLs/IADLs and increased strength          Objective     Observations     Right Knee   Positive for effusion       Palpation Additional Palpation Details  (+) R Pez Anserine Bursitis      Active Range of Motion   Left Knee   Flexion: 135 degrees   Extension: 0 degrees   Extensor la degrees     Right Knee   Flexion: 120 degrees / 130 degrees  Extension: 7 degrees  / 3 degrees  Extensor la degrees   /  5 degrees    Mobility   Patellar Mobility:   Left Knee   WFL: medial, lateral, superior and inferior       Right Knee   Hypomobile: medial, lateral, superior and inferior  / mobility has improved to wnl    Patellar Static Positioning   Right Knee: lateral tilt    Strength/Myotome Testing     Left Knee   Flexion: 5  Extension: 5    Right Knee   Flexion: 4  / 4+ pain in medial knee  Extension: 4 / 5  Quadriceps contraction: fair    Additional Strength Details  Hip Strength:   4/5 on R   / SLR:  4+/5    Swelling     Left Knee Girth Measurement (cm)   Joint line: 45 cm  /  44 5 cm  10 cm above joint line: 49 5 cm    Right Knee Girth Measurement (cm)   Joint line: 46 cm   43 5 cm   10 cm above joint line: 50 cm    Functional Assessment        Single Leg Stance - Eyes Open   Left  Trial 1: 5 seconds    /  10 seconds  Trial 2: 10 seconds  / 10 seconds    Right  Trial 1: 5 seconds   /  10 seconds   Trial 2: 6 seconds   /  10 seconds  Trial 3: 10 seconds  Average: 7 seconds

## 2022-03-09 NOTE — PROGRESS NOTES
Daily Note     Today's date: 3/9/2022  Patient name: Lory De Anda  : 1955  MRN: [id number]  Referring provider: Kayla López*  Dx:   Encounter Diagnosis     ICD-10-CM    1  Chronic pain of right knee  M25 561     G89 29                   Subjective: See rev      Objective: See treatment diary below      Assessment: Tolerated treatment well  Patient would benefit from continued PT      Plan: Continue per plan of care        Precautions: HTN        Manuals 2/2 2/9 2/14 2/17  2/21 2/23 2/28  3/2 3/7 3/9                JM patella  5 MW  5 MW 3' 3' 3 3'     REV          10'                Neuro Re-Ed                                                                                                        Ther Ex                          Bike nv 6 min  6' 6 6 7' 7 8' 7 7'   SLS    10" x 5  Foam  foam Foam 10" x 5  10 x :10 10 x :10 10 x :10                SLR 10 x :03  10x :03  1# 1 x 10 1 5#  X 10 1 5# x 10 1 5# 2 x 10 1 5# 2x10 2# 2 x 10 2# 2 x 10   Hip ABD s/l 10 x :03   1# x 10  1 5# x 10 1 5# x 10 1 5# x 20  1 5# 2x10 2# 2 x 10 2# 2 x 10   clamshells nv RTB 5" x 10  RTB  10x :05  GTB 2 x 10  GTB 2 x 10 GTB 2 x 10  GTB 2 x 10 GTB 2 x 10 GTB 2 x 10   Leg press  -B       120# 2 x 20  120# 2 x 20 120#/135#  2 x 10 135#  X 30   Side stepping   RTB x 5 laps  RTB 6 laps RTB 6 laps GTB 6 laps GTB x 6 laps GTB x 6 laps  GTB 6 laps GTB x 6 GTB x 6 laps   TM walking          5'    Total gym squats     lvl 18 2 x 20 lvl 22  2 x 20 lvl 22 2 x 20 lvl 22 x 20  LVL 22 x 20 LVL 22 LVL 22 x 30    Standing hip abd/ext     nv Hip ABD/Flex G x 20  G x 20 G x 20 G x 20   LP uni          105# x B   105# x 30   Sit to stand at grey table           nv                Gait Training                                       Modalities

## 2022-03-14 ENCOUNTER — OFFICE VISIT (OUTPATIENT)
Dept: PHYSICAL THERAPY | Facility: CLINIC | Age: 67
End: 2022-03-14
Payer: COMMERCIAL

## 2022-03-14 DIAGNOSIS — M25.561 CHRONIC PAIN OF RIGHT KNEE: Primary | ICD-10-CM

## 2022-03-14 DIAGNOSIS — G89.29 CHRONIC PAIN OF RIGHT KNEE: Primary | ICD-10-CM

## 2022-03-14 PROCEDURE — 97110 THERAPEUTIC EXERCISES: CPT

## 2022-03-14 PROCEDURE — 97112 NEUROMUSCULAR REEDUCATION: CPT

## 2022-03-14 NOTE — PROGRESS NOTES
Daily Note     Today's date: 3/14/2022  Patient name: Naga Awan  : 1955  MRN: [id number]  Referring provider: Chantel Hastings*  Dx:   Encounter Diagnosis     ICD-10-CM    1  Chronic pain of right knee  M25 561     G89 29                   Subjective: Patient states that she has noticed an improvement with pain and strength since starting PT  Objective: See treatment diary below      Assessment: Tolerated treatment well  Patient denies pain througout treatment session      Plan: Continue per plan of care        Precautions: HTN        Manuals 3/14    2/21 2/23 2/28  3/2 3/7 3/9                JM patella     3' 3' 3 3'     REV          10'                Neuro Re-Ed                                                                                                        Ther Ex                          Bike 8    6 7' 7 8' 7 7'   SLS 10" x10     Foam  foam Foam 10" x 5  10 x :10 10 x :10 10 x :10                SLR 2#2 x 10     1 5#  X 10 1 5# x 10 1 5# 2 x 10 1 5# 2x10 2# 2 x 10 2# 2 x 10   Hip ABD s/l 2# 2 x 10     1 5# x 10 1 5# x 10 1 5# x 20  1 5# 2x10 2# 2 x 10 2# 2 x 10   clamshells B x 20     GTB 2 x 10 GTB 2 x 10  GTB 2 x 10 GTB 2 x 10 GTB 2 x 10   Leg press  -B 135# 2 x 20       120# 2 x 20  120# 2 x 20 120#/135#  2 x 10 135#  X 30   Side stepping  G 6 laps     GTB 6 laps GTB x 6 laps GTB x 6 laps  GTB 6 laps GTB x 6 GTB x 6 laps   TM walking          5'    Total gym squats  Lvl 22 x 30    lvl 22  2 x 20 lvl 22 2 x 20 lvl 22 x 20  LVL 22 x 20 LVL 22 LVL 22 x 30    Standing hip abd/ext G  X 20     nv Hip ABD/Flex G x 20  G x 20 G x 20 G x 20   LP uni  105# 2 x 20         105# x B   105# x 30   Sit to stand at grey table  NV         nv                Gait Training                                       Modalities

## 2022-03-16 ENCOUNTER — OFFICE VISIT (OUTPATIENT)
Dept: PHYSICAL THERAPY | Facility: CLINIC | Age: 67
End: 2022-03-16
Payer: COMMERCIAL

## 2022-03-16 DIAGNOSIS — G89.29 CHRONIC PAIN OF RIGHT KNEE: Primary | ICD-10-CM

## 2022-03-16 DIAGNOSIS — M25.561 CHRONIC PAIN OF RIGHT KNEE: Primary | ICD-10-CM

## 2022-03-16 PROCEDURE — 97112 NEUROMUSCULAR REEDUCATION: CPT | Performed by: PHYSICAL THERAPIST

## 2022-03-16 PROCEDURE — 97110 THERAPEUTIC EXERCISES: CPT | Performed by: PHYSICAL THERAPIST

## 2022-03-16 PROCEDURE — 97530 THERAPEUTIC ACTIVITIES: CPT | Performed by: PHYSICAL THERAPIST

## 2022-03-16 NOTE — PROGRESS NOTES
Daily Note     Today's date: 3/16/2022  Patient name: Digna Bledsoe  : 1955  MRN: [id number]  Referring provider: Brenda King*  Dx:   Encounter Diagnosis     ICD-10-CM    1  Chronic pain of right knee  M25 561     G89 29                   Subjective: Patient reports that knee is feeling improved  Objective: See treatment diary below      Assessment: Tolerated treatment well  Patient would benefit from continued PT      Plan: Continue per plan of care        Precautions: HTN        Manuals 3/14 3/16   2/21 2/23 2/28  3/2 3/7 3/9                JM patella  dc   3' 3' 3 3'     REV          10'                Neuro Re-Ed                                                                                                        Ther Ex                          Bike 8 7'   6 7' 7 8' 7 7'   SLS 10" x10  10 x :10   Foam  foam Foam 10" x 5  10 x :10 10 x :10 10 x :10                SLR 2#2 x 10  2# x 20   1 5#  X 10 1 5# x 10 1 5# 2 x 10 1 5# 2x10 2# 2 x 10 2# 2 x 10   Hip ABD s/l 2# 2 x 10  2# 20   1 5# x 10 1 5# x 10 1 5# x 20  1 5# 2x10 2# 2 x 10 2# 2 x 10   clamshells B x 20  X 20   GTB 2 x 10 GTB 2 x 10  GTB 2 x 10 GTB 2 x 10 GTB 2 x 10   Leg press  -B 135# 2 x 20  135# 2 x 20     120# 2 x 20  120# 2 x 20 120#/135#  2 x 10 135#  X 30   Side stepping  G 6 laps  G 6 laps   GTB 6 laps GTB x 6 laps GTB x 6 laps  GTB 6 laps GTB x 6 GTB x 6 laps   TM walking          5'    Total gym squats  Lvl 22 x 30 LVL 22 x 30   lvl 22  2 x 20 lvl 22 2 x 20 lvl 22 x 20  LVL 22 x 20 LVL 22 LVL 22 x 30    Standing hip abd/ext G  X 20  G 2 x 20   nv Hip ABD/Flex G x 20  G x 20 G x 20 G x 20   LP uni  105# 2 x 20  105# 2 x 20       105# x B   105# x 30   Sit to stand at grey table  NV         nv                Gait Training                                       Modalities

## 2022-03-17 ENCOUNTER — OFFICE VISIT (OUTPATIENT)
Dept: INTERNAL MEDICINE CLINIC | Age: 67
End: 2022-03-17
Payer: COMMERCIAL

## 2022-03-17 VITALS
OXYGEN SATURATION: 97 % | HEART RATE: 90 BPM | TEMPERATURE: 97.8 F | HEIGHT: 67 IN | SYSTOLIC BLOOD PRESSURE: 116 MMHG | BODY MASS INDEX: 32.33 KG/M2 | WEIGHT: 206 LBS | DIASTOLIC BLOOD PRESSURE: 64 MMHG

## 2022-03-17 DIAGNOSIS — L85.3 DRY SKIN DERMATITIS: ICD-10-CM

## 2022-03-17 DIAGNOSIS — I10 ESSENTIAL HYPERTENSION: ICD-10-CM

## 2022-03-17 DIAGNOSIS — R79.89 HIGH SERUM MAGNESIUM: ICD-10-CM

## 2022-03-17 DIAGNOSIS — E78.2 MIXED HYPERLIPIDEMIA: Primary | ICD-10-CM

## 2022-03-17 PROCEDURE — 99214 OFFICE O/P EST MOD 30 MIN: CPT | Performed by: PHYSICIAN ASSISTANT

## 2022-03-17 PROCEDURE — 4010F ACE/ARB THERAPY RXD/TAKEN: CPT | Performed by: ORTHOPAEDIC SURGERY

## 2022-03-17 RX ORDER — VALSARTAN 80 MG/1
80 TABLET ORAL DAILY
Qty: 90 TABLET | Refills: 1 | Status: SHIPPED | OUTPATIENT
Start: 2022-03-17

## 2022-03-17 RX ORDER — ARIPIPRAZOLE 5 MG/1
5 TABLET ORAL DAILY
COMMUNITY
Start: 2022-03-08

## 2022-03-17 RX ORDER — AMMONIUM LACTATE 12 G/100G
LOTION TOPICAL DAILY PRN
Qty: 400 G | Refills: 2 | Status: SHIPPED | OUTPATIENT
Start: 2022-03-17

## 2022-03-17 RX ORDER — ROSUVASTATIN CALCIUM 10 MG/1
TABLET, COATED ORAL
Qty: 36 TABLET | Refills: 5 | Status: SHIPPED | OUTPATIENT
Start: 2022-03-17

## 2022-03-17 NOTE — PROGRESS NOTES
Assessment/Plan:         Diagnoses and all orders for this visit:    Mixed hyperlipidemia  Comments:  low chol diet   continue crestor - pt tolerating   Orders:  -     rosuvastatin (CRESTOR) 10 MG tablet; TAKE 1 TABLET BY MOUTH MONDAY, WEDNESDAY, AND FRIDAY    Dry skin dermatitis  -     ammonium lactate (LAC-HYDRIN) 12 % lotion; Apply topically daily as needed for dry skin    High serum magnesium  Comments:  mildly elevated  pt taking supplement with mag  repeat labs   denies use of mom, miralax etc  Orders:  -     Magnesium; Future    Essential hypertension  Comments:  controlled  Orders:  -     valsartan (DIOVAN) 80 mg tablet; Take 1 tablet (80 mg total) by mouth daily    Essential hypertension  Comments:  well controlled   Orders:  -     valsartan (DIOVAN) 80 mg tablet; Take 1 tablet (80 mg total) by mouth daily    Other orders  -     ARIPiprazole (ABILIFY) 5 mg tablet; Take 5 mg by mouth daily        BMI Counseling: Body mass index is 31 88 kg/m²  The BMI is above normal  Nutrition recommendations include decreasing portion sizes, encouraging healthy choices of fruits and vegetables and consuming healthier snacks  Exercise recommendations include moderate physical activity 150 minutes/week and strength training exercises  Rationale for BMI follow-up plan is due to patient being overweight or obese  Subjective:      Patient ID: Jennifer Linder is a 78 y o  female      79 y/o female with hx of htn, impaired FBS, hyperlipidemia  Labs reviewed hga1c 6 5  Pt going to gym     Pt has been going to PT for R knee pain which she states has been helpful for pain   Planning to do R knee surgery in near future   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 (pre op for eye surgery) saw Dr Denae Locke     Pt saw Dr Ruben Dejesus and was started on abilify 4 days ago, due to increased stressors with family situation and caring for her dgt   States she has had improvement with this        The following portions of the patient's history were reviewed and updated as appropriate: allergies, current medications, past family history, past medical history, past social history, past surgical history and problem list     Review of Systems   Constitutional: Negative for activity change, appetite change, chills, diaphoresis, fatigue and fever  HENT: Negative for congestion and sore throat  Eyes: Negative for pain and redness  Respiratory: Negative for cough, shortness of breath and wheezing  Cardiovascular: Negative for chest pain and leg swelling  Gastrointestinal: Negative for abdominal pain, constipation, diarrhea and nausea  Musculoskeletal: Positive for arthralgias (improved R knee)  Negative for gait problem and joint swelling  Skin: Negative for rash  Neurological: Negative for dizziness and headaches  Psychiatric/Behavioral: Negative for sleep disturbance  The patient is not nervous/anxious            Past Medical History:   Diagnosis Date    Acquired acanthosis nigricans     Anxiety     Depression 10/3/2017    Essential hypertension     HLD (hyperlipidemia)     Hypertension     Prediabetes     [facility]     last assessed: 10/03/2017    Type 2 diabetes mellitus without complication, without long-term current use of insulin ([facility]) 3/4/2021         Current Outpatient Medications:     amLODIPine (NORVASC) 10 mg tablet, Take 1 tablet (10 mg total) by mouth daily, Disp: 90 tablet, Rfl: 1    ammonium lactate (LAC-HYDRIN) 12 % lotion, Apply topically daily as needed for dry skin, Disp: 400 g, Rfl: 2    ARIPiprazole (ABILIFY) 5 mg tablet, Take 5 mg by mouth daily, Disp: , Rfl:     clonazePAM (KlonoPIN) 1 mg tablet, TAKE 1 TABLET BY MOUTH ONCE DAILY AS NEEDED FOR ANXIETY, Disp: , Rfl:     Dermatological Products, Misc  (EPICERAM) lotion, APPLY TO THE AFFECTED AREA(S) 1 TO 2 TIMES DAILY, Disp: , Rfl:     eszopiclone (LUNESTA) tablet, Take 3 mg by mouth daily at bedtime as needed, Disp: , Rfl: 0    SgQck-TnEagu-YW-B Cmp-C-Biot (INTEGRA PLUS) CAPS, Take 1 tablet by mouth daily, Disp: , Rfl: 0    rosuvastatin (CRESTOR) 10 MG tablet, TAKE 1 TABLET BY MOUTH MONDAY, WEDNESDAY, AND FRIDAY, Disp: 36 tablet, Rfl: 5    sitaGLIPtin-metFORMIN (Janumet)  MG per tablet, Take 1 tablet by mouth 2 (two) times a day, Disp: 180 tablet, Rfl: 0    solifenacin (VESICARE) 5 mg tablet, Take 1 tablet (5 mg total) by mouth daily, Disp: 90 tablet, Rfl: 1    tazarotene (AVAGE) 0 1 % cream, Apply topically daily , Disp: , Rfl: 0    triamcinolone (KENALOG) 0 1 % cream, APPLY EVERY MORNING TO [place] UP TO 2 WEEKS/MONTH AS NEEDED ON BODY, Disp: , Rfl:     WELLBUTRIN  MG 24 hr tablet, Take 1 tablet by mouth daily, Disp: , Rfl: 0    valsartan (DIOVAN) 80 mg tablet, Take 1 tablet (80 mg total) by mouth daily, Disp: 90 tablet, Rfl: 1    Allergies   Allergen Reactions    Escitalopram Rash    Sulfa Antibiotics Hives and Rash    Food Other (See Comments)     Annotation - [id number]: CORN - causes extreme sleepiness       Social History   Past Surgical History:   Procedure Laterality Date    APPENDECTOMY      TOTAL ABDOMINAL HYSTERECTOMY  1989    age 27   [facility] STUDY  8/2/2018     Family History   Problem Relation Age of Onset    Diabetes Mother     Stroke Mother     No Known Problems Father     No Known Problems Sister     No Known Problems Daughter     No Known Problems Brother     No Known Problems Brother     No Known Problems Brother     No Known Problems Half-Sister        Objective:  /64   Pulse 90   Temp 97 8 °F (36 6 °C) (Temporal)   Ht 5' 7 4" (1 712 m)   Wt 93 4 kg (206 lb)   SpO2 97%   BMI 31 88 kg/m²        Physical Exam  Vitals reviewed  Constitutional:       General: She is not in acute distress  HENT:      Head: Normocephalic        Right Ear: Tympanic membrane, ear canal and external ear normal       Left Ear: Tympanic membrane, ear canal and external ear normal    Eyes:      General:         Right eye: No discharge  Left eye: No discharge  Extraocular Movements: Extraocular movements intact  Conjunctiva/sclera: Conjunctivae normal       Pupils: Pupils are equal, round, and reactive to light  Cardiovascular:      Rate and Rhythm: Normal rate and regular rhythm  Pulmonary:      Effort: Pulmonary effort is normal  No respiratory distress  Breath sounds: Normal breath sounds  No wheezing or rales  Musculoskeletal:         General: Normal range of motion  Right lower leg: No edema  Left lower leg: No edema  Skin:     General: Skin is warm  Findings: No erythema or rash  Neurological:      General: No focal deficit present  Mental Status: She is alert and oriented to person, place, and time     Psychiatric:         Mood and Affect: Mood normal

## 2022-03-18 ENCOUNTER — OFFICE VISIT (OUTPATIENT)
Dept: OBGYN CLINIC | Facility: CLINIC | Age: 67
End: 2022-03-18
Payer: COMMERCIAL

## 2022-03-18 VITALS
SYSTOLIC BLOOD PRESSURE: 127 MMHG | HEIGHT: 67 IN | DIASTOLIC BLOOD PRESSURE: 86 MMHG | BODY MASS INDEX: 31.89 KG/M2 | WEIGHT: 203.2 LBS | HEART RATE: 84 BPM

## 2022-03-18 DIAGNOSIS — M17.11 PRIMARY OSTEOARTHRITIS OF RIGHT KNEE: Primary | ICD-10-CM

## 2022-03-18 PROCEDURE — 1036F TOBACCO NON-USER: CPT | Performed by: ORTHOPAEDIC SURGERY

## 2022-03-18 PROCEDURE — 99213 OFFICE O/P EST LOW 20 MIN: CPT | Performed by: ORTHOPAEDIC SURGERY

## 2022-03-18 PROCEDURE — 1160F RVW MEDS BY RX/DR IN RCRD: CPT | Performed by: ORTHOPAEDIC SURGERY

## 2022-03-18 NOTE — PROGRESS NOTES
Assessment/Plan:  1  Primary osteoarthritis of right knee  Ambulatory Referral to Orthopedic Surgery       Scribe Attestation    I,:  Luis Fernando Leiva am acting as a scribe while in the presence of the attending physician :       I,:  Royce Carey MD personally performed the services described in this documentation    as scribed in my presence :         Abhinav Ornelas upon examination and review of the x-rays of the right knee does demonstrate incremental clinical improvements with non operative treatment for her right knee medial compartment osteoarthritis  Her knee does feel grossly stable on exam today  I did note that the balance issues and episodes of falling can be attributed to weakness that is common secondary to severe osteoarthritis of the knee  I am encouraged that she has had improvements with physical therapy and activity modification  However, I do feel it is reasonable for her to have a discussion with our total joint specialist Dr Ladi Sung in regards to a total knee arthroplasty to fully restore her quality of life as she does feel she still has limitations  I did provide her with a referral to Dr Sunil Aguirre today  My office will help facilitate her seeing him in the future for consultation  She is encouraged to continue to be active tolerance  Abhinav Ornelas verbalized understanding and was amenable to her treatment plan presented to her today  I will see her back on an as-needed basis  Subjective:   Scott Wu is a 78 y o  female who presents to the office today for follow-up evaluation of her right knee  She states that she is doing well overall and does appreciate incremental improvements with the assistance of physical therapy  She does rate her improvement at approximately 80%  She states she does have a high pain tolerance and states that she does experience some discomfort to the medial aspect of her knee with prolonged weight-bearing activities    She has modified her exercise routine with the use of an income and bike verses regular bike  She notes that she is trying to progress herself to a treadmill to remain fit  She has avoided any weightlifting activities with the lower extremities  She has graduated herself out of the hinged knee brace as she feels that she is more stable  However, she does still experiencing intermittent balance issues and has suffered a few falls  She states she does also have some uncertainty with her knee while trying to step off of a stepladder  She notes that she is very suffer line and and the intermittent bouts of instability and falls have cause some concern  She does wish to have a discussion about a total knee arthroplasty in the future  Review of Systems   Constitutional: Negative for chills, fever and unexpected weight change  HENT: Negative for hearing loss, nosebleeds and sore throat  Eyes: Negative for pain, redness and visual disturbance  Respiratory: Negative for cough, shortness of breath and wheezing  Cardiovascular: Negative for chest pain, palpitations and leg swelling  Gastrointestinal: Negative for abdominal pain, nausea and vomiting  Endocrine: Negative for polydipsia and polyuria  Genitourinary: Negative for dysuria and hematuria  Musculoskeletal: Negative for arthralgias  See HPI   Skin: Negative for rash and wound  Neurological: Negative for dizziness, numbness and headaches  Psychiatric/Behavioral: Negative for decreased concentration and suicidal ideas  The patient is not nervous/anxious            Past Medical History:   Diagnosis Date    Acquired acanthosis nigricans     Anxiety     Depression 10/3/2017    Essential hypertension     HLD (hyperlipidemia)     Hypertension     Prediabetes     [facility] )     last assessed: 10/03/2017    Type 2 diabetes mellitus without complication, without long-term current use of insulin ([address] ) 3/4/2021 Past Surgical History:   Procedure Laterality Date    APPENDECTOMY      TOTAL ABDOMINAL HYSTERECTOMY  1989    age 27   [address] STUDY  8/2/2018       Family History   Problem Relation Age of Onset    Diabetes Mother     Stroke Mother     No Known Problems Father     No Known Problems Sister     No Known Problems Daughter     No Known Problems Brother     No Known Problems Brother     No Known Problems Brother     No Known Problems Half-Sister        Social History     Occupational History    Not on file   Tobacco Use    Smoking status: Never Smoker    Smokeless tobacco: Never Used   Vaping Use    Vaping Use: Never used   Substance and Sexual Activity    Alcohol use: No    Drug use: No    Sexual activity: Yes         Current Outpatient Medications:     amLODIPine (NORVASC) 10 mg tablet, Take 1 tablet (10 mg total) by mouth daily, Disp: 90 tablet, Rfl: 1    ammonium lactate (LAC-HYDRIN) 12 % lotion, Apply topically daily as needed for dry skin, Disp: 400 g, Rfl: 2    ARIPiprazole (ABILIFY) 5 mg tablet, Take 5 mg by mouth daily, Disp: , Rfl:     clonazePAM (KlonoPIN) 1 mg tablet, TAKE 1 TABLET BY MOUTH ONCE DAILY AS NEEDED FOR ANXIETY, Disp: , Rfl:     Dermatological Products, Misc  (EPICERAM) lotion, APPLY TO THE AFFECTED AREA(S) 1 TO 2 TIMES DAILY, Disp: , Rfl:     eszopiclone (LUNESTA) tablet, Take 3 mg by mouth daily at bedtime as needed, Disp: , Rfl: 0    EtEal-EaFiwi-UO-B Cmp-C-Biot (INTEGRA PLUS) CAPS, Take 1 tablet by mouth daily, Disp: , Rfl: 0    rosuvastatin (CRESTOR) 10 MG tablet, TAKE 1 TABLET BY MOUTH MONDAY, WEDNESDAY, AND FRIDAY, Disp: 36 tablet, Rfl: 5    sitaGLIPtin-metFORMIN (Janumet)  MG per tablet, Take 1 tablet by mouth 2 (two) times a day, Disp: 180 tablet, Rfl: 0    solifenacin (VESICARE) 5 mg tablet, Take 1 tablet (5 mg total) by mouth daily, Disp: 90 tablet, Rfl: 1    tazarotene (AVAGE) 0 1 % cream, Apply topically daily , Disp: , Rfl: 0    triamcinolone (KENALOG) 0 1 % cream, APPLY EVERY MORNING TO ECZEMA UP TO 2 WEEKS/MONTH AS NEEDED ON BODY, Disp: , Rfl:     valsartan (DIOVAN) 80 mg tablet, Take 1 tablet (80 mg total) by mouth daily, Disp: 90 tablet, Rfl: 1    WELLBUTRIN  MG 24 hr tablet, Take 1 tablet by mouth daily, Disp: , Rfl: 0    Allergies   Allergen Reactions    Escitalopram Rash    Sulfa Antibiotics Hives and Rash    Food Other (See Comments)     [facility] - [id number]: CORN - causes extreme sleepiness       Objective:  Vitals:    03/18/22 1050   BP: 127/86   Pulse: 84       Right Knee Exam     Tenderness   The patient is experiencing tenderness in the medial joint line  Range of Motion   Extension: 0   Flexion: 120     Tests   Varus: negative Valgus: negative  Drawer:  Anterior - negative    Posterior - negative    Other   Erythema: absent  Scars: absent  Sensation: normal  Pulse: present            Physical Exam  Vitals reviewed  HENT:      Head: Normocephalic and atraumatic  Eyes:      General:         Right eye: No discharge  Left eye: No discharge  Conjunctiva/sclera: Conjunctivae normal       Pupils: Pupils are equal, round, and reactive to light  Cardiovascular:      Rate and Rhythm: Normal rate  Pulmonary:      Effort: Pulmonary effort is normal  No respiratory distress  Musculoskeletal:      Cervical back: Normal range of motion and neck supple  Comments: As noted in HPI   Skin:     General: Skin is warm and dry  Neurological:      Mental Status: She is alert and oriented to person, place, and time  I have personally reviewed pertinent films in PACS and my interpretation is as follows:    X-rays of the right knee demonstrates severe medial compartment osteoarthritis with osteophytes and subchondral sclerosis

## 2022-03-21 ENCOUNTER — OFFICE VISIT (OUTPATIENT)
Dept: PHYSICAL THERAPY | Facility: CLINIC | Age: 67
End: 2022-03-21
Payer: COMMERCIAL

## 2022-03-21 DIAGNOSIS — G89.29 CHRONIC PAIN OF RIGHT KNEE: Primary | ICD-10-CM

## 2022-03-21 DIAGNOSIS — M25.561 CHRONIC PAIN OF RIGHT KNEE: Primary | ICD-10-CM

## 2022-03-21 PROCEDURE — 97112 NEUROMUSCULAR REEDUCATION: CPT | Performed by: PHYSICAL THERAPIST

## 2022-03-21 PROCEDURE — 97530 THERAPEUTIC ACTIVITIES: CPT | Performed by: PHYSICAL THERAPIST

## 2022-03-21 PROCEDURE — 97110 THERAPEUTIC EXERCISES: CPT | Performed by: PHYSICAL THERAPIST

## 2022-03-21 NOTE — PROGRESS NOTES
Daily note and DISCHARGE      Today's date: 3/21/2022  Patient name: Susan Perez   : 1955  MRN: [id number]  Referring provider: Khloe Kyle*  Dx:   Encounter Diagnosis     ICD-10-CM    1  Chronic pain of right knee  M25 561     G89 29                   Subjective: Patient reports that her knee is feeling good  She notes that she saw the MD who is referring her to Dr Carmelo Johnson for TKA consultation  Objective: See treatment diary below      Assessment: Tolerated treatment well  Patient with good understanding of hep  Plan: Discharge patient from physical therapy at this time         Precautions: HTN        Manuals 3/14 3/16 3/21  2/21 2/23 2/28  3/2 3/7 3/9                JM patella  dc   3' 3' 3 3'     REV          10'                Neuro Re-Ed                                                                                                        Ther Ex                          Bike 8 7' 7'  6 7' 7 8' 7 7'   SLS 10" x10  10 x :10 10 x :10  Foam  foam Foam 10" x 5  10 x :10 10 x :10 10 x :10                SLR 2#2 x 10  2# x 20 2# x 20  1 5#  X 10 1 5# x 10 1 5# 2 x 10 1 5# 2x10 2# 2 x 10 2# 2 x 10   Hip ABD s/l 2# 2 x 10  2# 20 2# x 20  1 5# x 10 1 5# x 10 1 5# x 20  1 5# 2x10 2# 2 x 10 2# 2 x 10   clamshells B x 20  X 20 x20  GTB 2 x 10 GTB 2 x 10  GTB 2 x 10 GTB 2 x 10 GTB 2 x 10   Leg press  -B 135# 2 x 20  135# 2 x 20 135# 2 x 20    120# 2 x 20  120# 2 x 20 120#/135#  2 x 10 135#  X 30   Side stepping  G 6 laps  G 6 laps G6 laps  GTB 6 laps GTB x 6 laps GTB x 6 laps  GTB 6 laps GTB x 6 GTB x 6 laps   TM walking          5'    Total gym squats  Lvl 22 x 30 LVL 22 x 30 LVL 22 x 30  lvl 22  2 x 20 lvl 22 2 x 20 lvl 22 x 20  LVL 22 x 20 LVL 22 LVL 22 x 30    Standing hip abd/ext G  X 20  G 2 x 20 G2 x 20  nv Hip ABD/Flex G x 20  G x 20 G x 20 G x 20   LP uni  105# 2 x 20  105# 2 x 20 105# 2 x  20      105# x B   105# x 30   Sit to stand at grey table  NV         nv Gait Training                                       Modalities

## 2022-03-23 ENCOUNTER — APPOINTMENT (OUTPATIENT)
Dept: PHYSICAL THERAPY | Facility: CLINIC | Age: 67
End: 2022-03-23
Payer: COMMERCIAL

## 2022-03-28 ENCOUNTER — APPOINTMENT (OUTPATIENT)
Dept: PHYSICAL THERAPY | Facility: CLINIC | Age: 67
End: 2022-03-28
Payer: COMMERCIAL

## 2022-03-30 ENCOUNTER — APPOINTMENT (OUTPATIENT)
Dept: PHYSICAL THERAPY | Facility: CLINIC | Age: 67
End: 2022-03-30
Payer: COMMERCIAL

## 2022-04-01 ENCOUNTER — OFFICE VISIT (OUTPATIENT)
Dept: OBGYN CLINIC | Facility: CLINIC | Age: 67
End: 2022-04-01
Payer: COMMERCIAL

## 2022-04-01 VITALS
BODY MASS INDEX: 31.39 KG/M2 | WEIGHT: 200 LBS | SYSTOLIC BLOOD PRESSURE: 110 MMHG | DIASTOLIC BLOOD PRESSURE: 68 MMHG | HEART RATE: 92 BPM | HEIGHT: 67 IN

## 2022-04-01 DIAGNOSIS — M17.11 PRIMARY OSTEOARTHRITIS OF RIGHT KNEE: Primary | ICD-10-CM

## 2022-04-01 PROCEDURE — 20610 DRAIN/INJ JOINT/BURSA W/O US: CPT | Performed by: ORTHOPAEDIC SURGERY

## 2022-04-01 PROCEDURE — 1036F TOBACCO NON-USER: CPT | Performed by: ORTHOPAEDIC SURGERY

## 2022-04-01 PROCEDURE — 99214 OFFICE O/P EST MOD 30 MIN: CPT | Performed by: ORTHOPAEDIC SURGERY

## 2022-04-01 PROCEDURE — 1160F RVW MEDS BY RX/DR IN RCRD: CPT | Performed by: ORTHOPAEDIC SURGERY

## 2022-04-01 PROCEDURE — 3008F BODY MASS INDEX DOCD: CPT | Performed by: ORTHOPAEDIC SURGERY

## 2022-04-01 RX ORDER — TRIAMCINOLONE ACETONIDE 40 MG/ML
80 INJECTION, SUSPENSION INTRA-ARTICULAR; INTRAMUSCULAR
Status: COMPLETED | OUTPATIENT
Start: 2022-04-01 | End: 2022-04-01

## 2022-04-01 RX ORDER — BUPIVACAINE HYDROCHLORIDE 5 MG/ML
6 INJECTION, SOLUTION EPIDURAL; INTRACAUDAL
Status: COMPLETED | OUTPATIENT
Start: 2022-04-01 | End: 2022-04-01

## 2022-04-01 RX ADMIN — TRIAMCINOLONE ACETONIDE 80 MG: 40 INJECTION, SUSPENSION INTRA-ARTICULAR; INTRAMUSCULAR at 14:00

## 2022-04-01 RX ADMIN — BUPIVACAINE HYDROCHLORIDE 6 ML: 5 INJECTION, SOLUTION EPIDURAL; INTRACAUDAL at 14:00

## 2022-04-01 NOTE — PROGRESS NOTES
Assessment/Plan:  1  Primary osteoarthritis of right knee  Ambulatory Referral to Orthopedic Surgery     78 F right knee osteoarthritis, worsening over last year now interfering with her activities of daily living, unresponsive to OTC pain medications and a course of physical therapy  She can remain weight bearing to tolerance right lower extremity  Patient was offered, accepted, and provided with right knee steroid injection for primary osteoarthritis pain  She can follow up in 3 months for repeat injection or further consideration of right total knee arthroplasty depending on her response  Large joint arthrocentesis  Universal Protocol:  Consent: Verbal consent obtained  Consent given by: patient    Supporting Documentation  Indications: pain   Procedure Details  Needle size: 22 G  Ultrasound guidance: no  Approach: anterolateral  Medications administered: 6 mL bupivacaine (PF) 0 5 %; 80 mg triamcinolone acetonide 40 mg/mL    Patient tolerance: patient tolerated the procedure well with no immediate complications  Dressing:  Sterile dressing applied          Subjective:   Hernan is a 77 yo F with a 1 year history of chronic knee pain  Pain is located medial joint line, worse with activity especially when she goes to the gym to exercise  This pain has been limiting her ability to exercise and also bothers her on a daily basis while walking or going up stairs  Sharp in character, intermittent in duration, moderate to severe in intensity  There is no radiation of the pain  She occasionally has knee swelling episodes that self resolve after these painful episodes  She denies fevers chills chest pain shortness of breath nausea vomiting  She has a history of prediabetes last A1C is 6 4% per patient  Patient ID: Elizabeth Baugh is a 78 y o  female    Review of Systems   Constitutional: Negative for chills and fever  HENT: Negative for ear pain and sore throat  Eyes: Negative for pain     Respiratory: Negative for cough and shortness of breath  Cardiovascular: Negative for chest pain  Gastrointestinal: Negative for nausea and vomiting  Endocrine: Negative for cold intolerance and heat intolerance  Genitourinary: Negative for difficulty urinating and dysuria  Musculoskeletal: Positive for joint swelling  Negative for back pain  Skin: Negative for rash and wound  Allergic/Immunologic: Positive for environmental allergies (Sulfa drugs)  Negative for food allergies  Neurological: Negative for numbness  Hematological: Does not bruise/bleed easily           Past Medical History:   Diagnosis Date    Acquired acanthosis nigricans     Anxiety     Depression 10/3/2017    Essential hypertension     HLD (hyperlipidemia)     Hypertension     Prediabetes     Type 2 diabetes mellitus without complication ([facility] )     last assessed: 10/03/2017    Type 2 diabetes mellitus without complication, without long-term current use of insulin ([facility] ) 3/4/2021       Past Surgical History:   Procedure Laterality Date    APPENDECTOMY      TOTAL ABDOMINAL HYSTERECTOMY  1989    age 27    [facility] STUDY  8/2/2018       Family History   Problem Relation Age of Onset    Diabetes Mother     Stroke Mother     No Known Problems Father     No Known Problems Sister     No Known Problems Daughter     No Known Problems Brother     No Known Problems Brother     No Known Problems Brother     No Known Problems Half-Sister        Social History     Occupational History    Not on file   Tobacco Use    Smoking status: Never Smoker    Smokeless tobacco: Never Used   Vaping Use    Vaping Use: Never used   Substance and Sexual Activity    Alcohol use: No    Drug use: No    Sexual activity: Yes         Current Outpatient Medications:     amLODIPine (NORVASC) 10 mg tablet, Take 1 tablet (10 mg total) by mouth daily, Disp: 90 tablet, Rfl: 1    ammonium lactate (LAC-HYDRIN) 12 % lotion, Apply topically daily as needed for dry skin, Disp: 400 g, Rfl: 2    ARIPiprazole (ABILIFY) 5 mg tablet, Take 5 mg by mouth daily, Disp: , Rfl:     clonazePAM (KlonoPIN) 1 mg tablet, TAKE 1 TABLET BY MOUTH ONCE DAILY AS NEEDED FOR ANXIETY, Disp: , Rfl:     Dermatological Products, Misc  (EPICERAM) lotion, APPLY TO THE AFFECTED AREA(S) 1 TO 2 TIMES DAILY, Disp: , Rfl:     eszopiclone (LUNESTA) tablet, Take 3 mg by mouth daily at bedtime as needed, Disp: , Rfl: 0    QdZya-WcAksi-AE-B Cmp-C-Biot (INTEGRA PLUS) CAPS, Take 1 tablet by mouth daily, Disp: , Rfl: 0    rosuvastatin (CRESTOR) 10 MG tablet, TAKE 1 TABLET BY MOUTH MONDAY, WEDNESDAY, AND FRIDAY, Disp: 36 tablet, Rfl: 5    sitaGLIPtin-metFORMIN (Janumet)  MG per tablet, Take 1 tablet by mouth 2 (two) times a day, Disp: 180 tablet, Rfl: 0    solifenacin (VESICARE) 5 mg tablet, Take 1 tablet (5 mg total) by mouth daily, Disp: 90 tablet, Rfl: 1    tazarotene (AVAGE) 0 1 % cream, Apply topically daily , Disp: , Rfl: 0    triamcinolone (KENALOG) 0 1 % cream, APPLY EVERY MORNING TO ECZEMA UP TO 2 WEEKS/MONTH AS NEEDED ON BODY, Disp: , Rfl:     valsartan (DIOVAN) 80 mg tablet, Take 1 tablet (80 mg total) by mouth daily, Disp: 90 tablet, Rfl: 1    WELLBUTRIN  MG 24 hr tablet, Take 1 tablet by mouth daily, Disp: , Rfl: 0    Allergies   Allergen Reactions    Escitalopram Rash    Sulfa Antibiotics Hives and Rash    Food Other (See Comments)     Annotation - [id number]: CORN - causes extreme sleepiness       Objective: There were no vitals filed for this visit  Body mass index is 30 95 kg/m²  Right Knee Exam     Muscle Strength   The patient has normal right knee strength  Tenderness   The patient is experiencing tenderness in the medial joint line  Range of Motion   The patient has normal right knee ROM      Tests   Lasha:  Medial - negative Lateral - negative  Varus: negative Valgus: negative  Lachman:  Anterior - negative      Drawer: Anterior - negative    Posterior - negative  Right knee patellar apprehension test: Bony crepitus and mild tenderness with compression over patella during flexion arc  Physical Exam  Musculoskeletal:      Right knee:      Instability Tests: Medial Lasha test negative and lateral Lasha test negative  I have personally reviewed pertinent films in PACS  Xray right knee shows medial joint space narrowing subchondral sclerosis, osteophyte formation, joint space narrowing patellofemoral compartment   No fracture or dislocation

## 2022-05-12 ENCOUNTER — OFFICE VISIT (OUTPATIENT)
Dept: INTERNAL MEDICINE CLINIC | Age: 67
End: 2022-05-12
Payer: COMMERCIAL

## 2022-05-12 VITALS
BODY MASS INDEX: 32.33 KG/M2 | HEIGHT: 67 IN | WEIGHT: 206 LBS | SYSTOLIC BLOOD PRESSURE: 126 MMHG | OXYGEN SATURATION: 99 % | DIASTOLIC BLOOD PRESSURE: 62 MMHG | HEART RATE: 82 BPM | TEMPERATURE: 97.5 F

## 2022-05-12 DIAGNOSIS — B35.9 TINEA: Primary | ICD-10-CM

## 2022-05-12 DIAGNOSIS — R73.01 IMPAIRED FASTING GLUCOSE: ICD-10-CM

## 2022-05-12 DIAGNOSIS — I10 ESSENTIAL HYPERTENSION: ICD-10-CM

## 2022-05-12 DIAGNOSIS — N32.81 OAB (OVERACTIVE BLADDER): ICD-10-CM

## 2022-05-12 DIAGNOSIS — L81.9 HYPOPIGMENTED SKIN LESION: ICD-10-CM

## 2022-05-12 DIAGNOSIS — Z12.31 ENCOUNTER FOR SCREENING MAMMOGRAM FOR MALIGNANT NEOPLASM OF BREAST: ICD-10-CM

## 2022-05-12 PROCEDURE — 3008F BODY MASS INDEX DOCD: CPT | Performed by: PHYSICIAN ASSISTANT

## 2022-05-12 PROCEDURE — 1036F TOBACCO NON-USER: CPT | Performed by: PHYSICIAN ASSISTANT

## 2022-05-12 PROCEDURE — 99213 OFFICE O/P EST LOW 20 MIN: CPT | Performed by: PHYSICIAN ASSISTANT

## 2022-05-12 PROCEDURE — 1160F RVW MEDS BY RX/DR IN RCRD: CPT | Performed by: PHYSICIAN ASSISTANT

## 2022-05-12 RX ORDER — SOLIFENACIN SUCCINATE 5 MG/1
5 TABLET, FILM COATED ORAL DAILY
Qty: 90 TABLET | Refills: 1 | Status: SHIPPED | OUTPATIENT
Start: 2022-05-12

## 2022-05-12 RX ORDER — DULOXETIN HYDROCHLORIDE 60 MG/1
60 CAPSULE, DELAYED RELEASE ORAL DAILY
COMMUNITY
Start: 2022-04-12

## 2022-05-12 RX ORDER — AMLODIPINE BESYLATE 10 MG/1
10 TABLET ORAL DAILY
Qty: 90 TABLET | Refills: 1 | Status: SHIPPED | OUTPATIENT
Start: 2022-05-12

## 2022-05-12 RX ORDER — SITAGLIPTIN AND METFORMIN HYDROCHLORIDE 1000; 50 MG/1; MG/1
1 TABLET, FILM COATED ORAL 2 TIMES DAILY
Qty: 180 TABLET | Refills: 1 | Status: SHIPPED | OUTPATIENT
Start: 2022-05-12

## 2022-05-12 RX ORDER — CLOTRIMAZOLE 1 %
CREAM (GRAM) TOPICAL 2 TIMES DAILY
Qty: 60 G | Refills: 0 | Status: SHIPPED | OUTPATIENT
Start: 2022-05-12 | End: 2022-07-21

## 2022-05-12 NOTE — PROGRESS NOTES
Assessment/Plan:         Diagnoses and all orders for this visit:    Tinea  Comments:  trial clotrimazole  to call derm and schedule apt asap  Orders:  -     clotrimazole (LOTRIMIN) 1 % cream; Apply topically 2 (two) times a day    Essential hypertension  Comments:  well controlled   Orders:  -     amLODIPine (NORVASC) 10 mg tablet; Take 1 tablet (10 mg total) by mouth in the morning  OAB (overactive bladder)  Comments:  improved with vesicare  Orders:  -     solifenacin (VESICARE) 5 mg tablet; Take 1 tablet (5 mg total) by mouth in the morning  Impaired fasting glucose  Comments:  f/u labs due  continue with daily exercise   Orders:  -     sitaGLIPtin-metFORMIN (Janumet)  MG per tablet; Take 1 tablet by mouth in the morning and 1 tablet in the evening  Encounter for screening mammogram for malignant neoplasm of breast  -     Mammo screening bilateral w 3d & cad; Future    Hypopigmented skin lesion    Other orders  -     DULoxetine (CYMBALTA) 60 mg delayed release capsule; Take 60 mg by mouth in the morning  Subjective:      Patient ID: Rondal Boxer is a 78 y o  female  79 y/o female with hx of impaired fbs, OA, htn, hyperlipidemia  Pt reports decreased pigmentation of the face which she noted a few days ago, this seemed to occur approx 3 days after IAI R knee which she thought may be related  Pt has not used anything topically at this time, denies itching or discomfort or any other patches on the skin  She tried to call dermatology for evaluation but can't get in until mid June     Pt reports IAI did not help her pain in the R knee       The following portions of the patient's history were reviewed and updated as appropriate: allergies, current medications, past family history, past medical history, past social history, past surgical history and problem list     Review of Systems   Constitutional: Negative for appetite change, chills, diaphoresis, fatigue and fever     HENT: Negative for congestion and sore throat  Respiratory: Negative for cough and shortness of breath  Cardiovascular: Negative for chest pain and leg swelling  Gastrointestinal: Negative for abdominal pain, constipation, diarrhea and nausea  Musculoskeletal: Positive for arthralgias (R knee )  Skin: Positive for rash  Negative for wound  Neurological: Negative for dizziness, light-headedness and headaches  Psychiatric/Behavioral: Negative for sleep disturbance  The patient is not nervous/anxious            Past Medical History:   Diagnosis Date    Acquired acanthosis nigricans     Anxiety     Depression 10/3/2017    Essential hypertension     HLD (hyperlipidemia)     Hypertension     Prediabetes     Type 2 diabetes mellitus without complication ([facility] )     last assessed: 10/03/2017    Type 2 diabetes mellitus without complication, without long-term current use of insulin ([facility] 3/4/2021         Current Outpatient Medications:     amLODIPine (NORVASC) 10 mg tablet, Take 1 tablet (10 mg total) by mouth in the morning , Disp: 90 tablet, Rfl: 1    ammonium lactate (LAC-HYDRIN) 12 % lotion, Apply topically daily as needed for dry skin, Disp: 400 g, Rfl: 2    ARIPiprazole (ABILIFY) 5 mg tablet, Take 5 mg by mouth daily, Disp: , Rfl:     clonazePAM (KlonoPIN) 1 mg tablet, TAKE 1 TABLET BY MOUTH ONCE DAILY AS NEEDED FOR ANXIETY, Disp: , Rfl:     clotrimazole (LOTRIMIN) 1 % cream, Apply topically 2 (two) times a day, Disp: 60 g, Rfl: 0    Dermatological Products, Misc  (EPICERAM) lotion, APPLY TO THE AFFECTED AREA(S) 1 TO 2 TIMES DAILY, Disp: , Rfl:     DULoxetine (CYMBALTA) 60 mg delayed release capsule, Take 60 mg by mouth in the morning , Disp: , Rfl:     eszopiclone (LUNESTA) tablet, Take 3 mg by mouth daily at bedtime as needed, Disp: , Rfl: 0    VcGve-ZlTsee-CX-B Cmp-C-Biot (INTEGRA PLUS) CAPS, Take 1 tablet by mouth daily, Disp: , Rfl: 0    rosuvastatin (CRESTOR) 10 MG tablet, TAKE 1 TABLET BY MOUTH MONDAY, WEDNESDAY, AND FRIDAY, Disp: 36 tablet, Rfl: 5    sitaGLIPtin-metFORMIN (Janumet)  MG per tablet, Take 1 tablet by mouth in the morning and 1 tablet in the evening , Disp: 180 tablet, Rfl: 1    solifenacin (VESICARE) 5 mg tablet, Take 1 tablet (5 mg total) by mouth in the morning , Disp: 90 tablet, Rfl: 1    tazarotene (AVAGE) 0 1 % cream, Apply topically daily , Disp: , Rfl: 0    valsartan (DIOVAN) 80 mg tablet, Take 1 tablet (80 mg total) by mouth daily, Disp: 90 tablet, Rfl: 1    WELLBUTRIN  MG 24 hr tablet, Take 1 tablet by mouth daily, Disp: , Rfl: 0    triamcinolone (KENALOG) 0 1 % cream, APPLY EVERY MORNING TO ECZEMA UP TO 2 WEEKS/MONTH AS NEEDED ON BODY (Patient not taking: Reported on 5/12/2022), Disp: , Rfl:     Allergies   Allergen Reactions    Escitalopram Rash    Sulfa Antibiotics Hives and Rash    Food Other (See Comments)     Annotation - [id number]: CORN - causes extreme sleepiness       Social History   Past Surgical History:   Procedure Laterality Date    APPENDECTOMY      TOTAL ABDOMINAL HYSTERECTOMY  1989    age 27   [facility] STUDY  8/2/2018     Family History   Problem Relation Age of Onset    Diabetes Mother     Stroke Mother     No Known Problems Father     No Known Problems Sister     No Known Problems Daughter     No Known Problems Brother     No Known Problems Brother     No Known Problems Brother     No Known Problems Half-Sister        Objective:  /62 (BP Location: Left arm, Patient Position: Sitting, Cuff Size: Standard)   Pulse 82   Temp 97 5 °F (36 4 °C) (Temporal)   Ht 5' 7 4" (1 712 m)   Wt 93 4 kg (206 lb)   SpO2 99%   BMI 31 88 kg/m²        Physical Exam  Vitals reviewed  Constitutional:       General: She is not in acute distress  Eyes:      Conjunctiva/sclera: Conjunctivae normal       Pupils: Pupils are equal, round, and reactive to light     Pulmonary:      Effort: Pulmonary effort is normal  No respiratory distress  Breath sounds: No wheezing or rales  Musculoskeletal:      Right lower leg: No edema  Left lower leg: No edema  Skin:     Findings: Lesion (patch hypopigmentation R cheek) and rash present  No erythema  Neurological:      General: No focal deficit present  Mental Status: She is alert

## 2022-07-18 ENCOUNTER — VBI (OUTPATIENT)
Dept: ADMINISTRATIVE | Facility: OTHER | Age: 67
End: 2022-07-18

## 2022-07-21 ENCOUNTER — OFFICE VISIT (OUTPATIENT)
Dept: INTERNAL MEDICINE CLINIC | Age: 67
End: 2022-07-21
Payer: COMMERCIAL

## 2022-07-21 VITALS
WEIGHT: 216 LBS | SYSTOLIC BLOOD PRESSURE: 118 MMHG | OXYGEN SATURATION: 96 % | BODY MASS INDEX: 33.9 KG/M2 | TEMPERATURE: 98.1 F | DIASTOLIC BLOOD PRESSURE: 80 MMHG | HEART RATE: 85 BPM | HEIGHT: 67 IN

## 2022-07-21 DIAGNOSIS — L30.9 DERMATITIS: ICD-10-CM

## 2022-07-21 DIAGNOSIS — I10 HYPERTENSION, ESSENTIAL: ICD-10-CM

## 2022-07-21 DIAGNOSIS — L50.9 HIVES: ICD-10-CM

## 2022-07-21 DIAGNOSIS — E78.00 HYPERCHOLESTEROLEMIA: Primary | ICD-10-CM

## 2022-07-21 DIAGNOSIS — F41.1 GAD (GENERALIZED ANXIETY DISORDER): ICD-10-CM

## 2022-07-21 DIAGNOSIS — E11.9 TYPE 2 DIABETES MELLITUS WITHOUT COMPLICATION, WITHOUT LONG-TERM CURRENT USE OF INSULIN (HCC): ICD-10-CM

## 2022-07-21 PROBLEM — E78.2 MIXED HYPERLIPIDEMIA: Status: ACTIVE | Noted: 2017-05-04

## 2022-07-21 PROCEDURE — 99214 OFFICE O/P EST MOD 30 MIN: CPT | Performed by: PHYSICIAN ASSISTANT

## 2022-07-21 PROCEDURE — 1160F RVW MEDS BY RX/DR IN RCRD: CPT | Performed by: PHYSICIAN ASSISTANT

## 2022-07-21 NOTE — PROGRESS NOTES
Assessment/Plan:         Diagnoses and all orders for this visit:    Hypercholesterolemia  Comments:  continue rosuvustatin     Hypertension, essential  Comments:  well controlled    Type 2 diabetes mellitus without complication, without long-term current use of insulin (HCC)    TASHA (generalized anxiety disorder)    Dermatitis  Comments:  opzelura (ruxolitinib) cream - ordered by dermatology at speciality pharmacy   following with dermatology ([facility])  Orders:  -     [facility] Allergy Panel, Adult; Future  -     Food Allergy Profile; Future    Hives  Comments:  unaware of trigger  anxiety may be causing  uses allegra prn   Orders:  -     [facility] Allergy Panel, Adult; Future  -     Food Allergy Profile; Future          Subjective:      Patient ID: Richard Lombardi is a 78 y o  female  79 y/o female with hx of tinea, dm, hld  Seeing dermatology and trying to get laser treatment ([facility]/ [facility]) but awaiting insurance coverage info  She reports increased stress with dgt     She reports hives after eating shrimp and scallops  No hx of known allergy to shellfish but will aviod  Took 4 allegra with improvement of hives  Discussed allergy testing     Pt states she discussed lab results on phone with dr pimentel  FBS slightly elevated  Trying to improve with diet  Reviewed labs with pt           The following portions of the patient's history were reviewed and updated as appropriate: allergies, current medications, past family history, past medical history, past social history, past surgical history and problem list     Review of Systems   Constitutional: Negative for appetite change, chills, diaphoresis, fatigue and fever  HENT: Negative for congestion  Eyes: Negative for redness  Respiratory: Negative for cough and shortness of breath  Cardiovascular: Negative for chest pain and leg swelling  Gastrointestinal: Negative for abdominal pain, constipation, diarrhea and nausea     Musculoskeletal: Negative for arthralgias, back pain and gait problem  Skin: Positive for rash  Neurological: Negative for dizziness and headaches  Hematological: Does not bruise/bleed easily  Psychiatric/Behavioral: Negative for sleep disturbance  The patient is nervous/anxious            Past Medical History:   Diagnosis Date    Acquired acanthosis nigricans     Anxiety     Depression 10/3/2017    Essential hypertension     HLD (hyperlipidemia)     Hypertension     Prediabetes     [facility] )     last assessed: 10/03/2017    Type 2 diabetes mellitus without complication, without long-term current use of insulin ([facility]) 3/4/2021         Current Outpatient Medications:     amLODIPine (NORVASC) 10 mg tablet, Take 1 tablet (10 mg total) by mouth in the morning , Disp: 90 tablet, Rfl: 1    ammonium lactate (LAC-HYDRIN) 12 % lotion, Apply topically daily as needed for dry skin, Disp: 400 g, Rfl: 2    ARIPiprazole (ABILIFY) 5 mg tablet, Take 5 mg by mouth daily, Disp: , Rfl:     clonazePAM (KlonoPIN) 1 mg tablet, TAKE 1 TABLET BY MOUTH ONCE DAILY AS NEEDED FOR ANXIETY, Disp: , Rfl:     Dermatological Products, Misc  (EPICERAM) lotion, APPLY TO THE AFFECTED AREA(S) 1 TO 2 TIMES DAILY, Disp: , Rfl:     DULoxetine (CYMBALTA) 60 mg delayed release capsule, Take 60 mg by mouth in the morning , Disp: , Rfl:     eszopiclone (LUNESTA) tablet, Take 3 mg by mouth daily at bedtime as needed, Disp: , Rfl: 0    FpEet-EwBbbp-YG-B Cmp-C-Biot (INTEGRA PLUS) CAPS, Take 1 tablet by mouth daily, Disp: , Rfl: 0    rosuvastatin (CRESTOR) 10 MG tablet, TAKE 1 TABLET BY MOUTH MONDAY, WEDNESDAY, AND FRIDAY, Disp: 36 tablet, Rfl: 5    sitaGLIPtin-metFORMIN (Janumet)  MG per tablet, Take 1 tablet by mouth in the morning and 1 tablet in the evening , Disp: 180 tablet, Rfl: 1    solifenacin (VESICARE) 5 mg tablet, Take 1 tablet (5 mg total) by mouth in the morning , Disp: 90 tablet, Rfl: 1   tazarotene (AVAGE) 0 1 % cream, Apply topically daily , Disp: , Rfl: 0    valsartan (DIOVAN) 80 mg tablet, Take 1 tablet (80 mg total) by mouth daily, Disp: 90 tablet, Rfl: 1    WELLBUTRIN  MG 24 hr tablet, Take 1 tablet by mouth daily, Disp: , Rfl: 0    Allergies   Allergen Reactions    Escitalopram Rash    Sulfa Antibiotics Hives and Rash    Food Other (See Comments)     Annotation - [id number]: CORN - causes extreme sleepiness       Social History   Past Surgical History:   Procedure Laterality Date    APPENDECTOMY      TOTAL ABDOMINAL HYSTERECTOMY  1989    age 27   [facility] STUDY  8/2/2018     Family History   Problem Relation Age of Onset    Diabetes Mother     Stroke Mother     No Known Problems Father     No Known Problems Sister     No Known Problems Daughter     No Known Problems Brother     No Known Problems Brother     No Known Problems Brother     No Known Problems Half-Sister        Objective:  /80 (BP Location: Left arm, Patient Position: Sitting, Cuff Size: Standard)   Pulse 85   Temp 98 1 °F (36 7 °C) (Temporal)   Ht 5' 7 4" (1 712 m)   Wt 98 kg (216 lb)   SpO2 96% Comment: room air  BMI 33 43 kg/m²        Physical Exam  Vitals reviewed  Constitutional:       General: She is not in acute distress  HENT:      Head: Normocephalic and atraumatic  Eyes:      Extraocular Movements: Extraocular movements intact  Conjunctiva/sclera: Conjunctivae normal       Pupils: Pupils are equal, round, and reactive to light  Cardiovascular:      Rate and Rhythm: Normal rate and regular rhythm  Pulmonary:      Effort: Pulmonary effort is normal  No respiratory distress  Breath sounds: Normal breath sounds  No wheezing or rhonchi  Musculoskeletal:         General: Normal range of motion  Right lower leg: No edema  Left lower leg: No edema  Skin:     General: Skin is warm        Findings: No rash (no hives currently, hypopig patch on cheek )  Neurological:      General: No focal deficit present  Mental Status: She is alert and oriented to person, place, and time     Psychiatric:         Mood and Affect: Mood normal

## 2022-07-22 ENCOUNTER — OFFICE VISIT (OUTPATIENT)
Dept: OBGYN CLINIC | Facility: CLINIC | Age: 67
End: 2022-07-22
Payer: COMMERCIAL

## 2022-07-22 VITALS
DIASTOLIC BLOOD PRESSURE: 84 MMHG | WEIGHT: 210.8 LBS | HEIGHT: 67 IN | BODY MASS INDEX: 33.09 KG/M2 | HEART RATE: 91 BPM | SYSTOLIC BLOOD PRESSURE: 127 MMHG

## 2022-07-22 DIAGNOSIS — G89.29 CHRONIC PAIN OF RIGHT KNEE: ICD-10-CM

## 2022-07-22 DIAGNOSIS — R73.03 PREDIABETES: ICD-10-CM

## 2022-07-22 DIAGNOSIS — M25.561 CHRONIC PAIN OF RIGHT KNEE: ICD-10-CM

## 2022-07-22 DIAGNOSIS — M17.11 PRIMARY OSTEOARTHRITIS OF RIGHT KNEE: Primary | ICD-10-CM

## 2022-07-22 PROCEDURE — 3079F DIAST BP 80-89 MM HG: CPT | Performed by: ORTHOPAEDIC SURGERY

## 2022-07-22 PROCEDURE — 1160F RVW MEDS BY RX/DR IN RCRD: CPT | Performed by: ORTHOPAEDIC SURGERY

## 2022-07-22 PROCEDURE — 3074F SYST BP LT 130 MM HG: CPT | Performed by: ORTHOPAEDIC SURGERY

## 2022-07-22 PROCEDURE — 99214 OFFICE O/P EST MOD 30 MIN: CPT | Performed by: ORTHOPAEDIC SURGERY

## 2022-07-22 NOTE — PROGRESS NOTES
Assessment/Plan:  1  Primary osteoarthritis of right knee  Injection Procedure Prior Authorization   2  Chronic pain of right knee  Injection Procedure Prior Authorization   3  Prediabetes  Injection Procedure Prior Authorization     Scribe Attestation    I,:  Bing Luu PA-C am acting as a scribe while in the presence of the attending physician :       I,:  Dianna Diggs DO personally performed the services described in this documentation    as scribed in my presence :         Anders Soler is a pleasant 48-year-old female presenting today for follow-up of her activity related right knee pain due to her severe end-stage underlying osteoarthritis  Unfortunately, she has now demonstrated failure of the cortisone injection that was administered 3 and half months ago  We discussed that it is unlikely that the skin discoloration under her right eye is related to the right knee steroid injection  However, because of this potential side effect and because the injection was not efficacious, we agreed to defer a repeat injection today  We discussed options including viscosupplementation verses pursuing a robotic assisted right total knee arthroplasty  She would like to exhaust all conservative treatment before considering surgery, and we are in agreement  Therefore, we will submit for authorization of viscosupplementation  When approved, she can return to the office to initiate the series  She expressed understanding all of her questions were addressed    Subjective: Right knee follow up    Patient ID: Arnaldo Agee is a 78 y o  female  Anders Soler is a pleasant 78year old female presenting today for follow-up of her activity related right knee pain with known end-stage underlying osteoarthritis  She underwent a cortisone injection approximately 3 and half months ago, but states that it failed to provide any significant relief    Proximally 5 days after the injection, she noticed a discoloration of her skin under her right eye  She is unsure if this is correlated with the steroid injection or just coincidence  She did not have any discoloration around the injection site  This is being managed by Dermatology and she has not had any other reactions  She continues to have activity related pain on a daily basis    Review of Systems   Constitutional: Positive for activity change  HENT: Negative  Eyes: Negative  Respiratory: Negative  Cardiovascular: Negative  Gastrointestinal: Negative  Endocrine: Negative  Genitourinary: Negative  Musculoskeletal: Positive for arthralgias, gait problem, joint swelling and myalgias  Skin: Positive for color change  face   Allergic/Immunologic: Negative  Hematological: Negative  Psychiatric/Behavioral: Negative        Past Medical History:   Diagnosis Date    Acquired acanthosis nigricans     Anxiety     Depression 10/3/2017    Essential hypertension     HLD (hyperlipidemia)     Hypertension     Prediabetes     Type 2 diabetes mellitus without complication ([facility] )     last assessed: 10/03/2017    Type 2 diabetes mellitus without complication, without long-term current use of insulin ([facility] ) 3/4/2021     Past Surgical History:   Procedure Laterality Date    APPENDECTOMY      TOTAL ABDOMINAL HYSTERECTOMY  1989    age 27    [address] STUDY  8/2/2018     Family History   Problem Relation Age of Onset    Diabetes Mother     Stroke Mother     No Known Problems Father     No Known Problems Sister     No Known Problems Daughter     No Known Problems Brother     No Known Problems Brother     No Known Problems Brother     No Known Problems Half-Sister        Social History     Occupational History    Not on file   Tobacco Use    Smoking status: Never Smoker    Smokeless tobacco: Never Used   Vaping Use    Vaping Use: Never used   Substance and Sexual Activity    Alcohol use: No    Drug use: No    Sexual activity: Yes         Current Outpatient Medications:     amLODIPine (NORVASC) 10 mg tablet, Take 1 tablet (10 mg total) by mouth in the morning , Disp: 90 tablet, Rfl: 1    ammonium lactate (LAC-HYDRIN) 12 % lotion, Apply topically daily as needed for dry skin, Disp: 400 g, Rfl: 2    ARIPiprazole (ABILIFY) 5 mg tablet, Take 5 mg by mouth daily, Disp: , Rfl:     clonazePAM (KlonoPIN) 1 mg tablet, TAKE 1 TABLET BY MOUTH ONCE DAILY AS NEEDED FOR ANXIETY, Disp: , Rfl:     Dermatological Products, Misc  (EPICERAM) lotion, APPLY TO THE AFFECTED AREA(S) 1 TO 2 TIMES DAILY, Disp: , Rfl:     DULoxetine (CYMBALTA) 60 mg delayed release capsule, Take 60 mg by mouth in the morning , Disp: , Rfl:     eszopiclone (LUNESTA) tablet, Take 3 mg by mouth daily at bedtime as needed, Disp: , Rfl: 0    SeRum-PrHnez-VW-B Cmp-C-Biot (INTEGRA PLUS) CAPS, Take 1 tablet by mouth daily, Disp: , Rfl: 0    rosuvastatin (CRESTOR) 10 MG tablet, TAKE 1 TABLET BY MOUTH MONDAY, WEDNESDAY, AND FRIDAY, Disp: 36 tablet, Rfl: 5    sitaGLIPtin-metFORMIN (Janumet)  MG per tablet, Take 1 tablet by mouth in the morning and 1 tablet in the evening , Disp: 180 tablet, Rfl: 1    solifenacin (VESICARE) 5 mg tablet, Take 1 tablet (5 mg total) by mouth in the morning , Disp: 90 tablet, Rfl: 1    tazarotene (AVAGE) 0 1 % cream, Apply topically daily , Disp: , Rfl: 0    valsartan (DIOVAN) 80 mg tablet, Take 1 tablet (80 mg total) by mouth daily, Disp: 90 tablet, Rfl: 1    WELLBUTRIN  MG 24 hr tablet, Take 1 tablet by mouth daily, Disp: , Rfl: 0    Allergies   Allergen Reactions    Escitalopram Rash    Sulfa Antibiotics Hives and Rash    Food Other (See Comments)     Annotation - [id number]: CORN - causes extreme sleepiness       Objective:  Vitals:    07/22/22 0932   BP: 127/84   Pulse: 91       Body mass index is 32 63 kg/m²  Right Knee Exam     Muscle Strength   The patient has normal right knee strength      Tenderness   The patient is experiencing tenderness in the medial joint line and MCL  Range of Motion   Extension:  0 normal   Flexion:  120 normal     Tests   Lasha:  Medial - negative Lateral - negative  Varus: negative Valgus: negative  Drawer:  Anterior - negative    Posterior - negative  Patellar apprehension: negative    Other   Erythema: absent  Scars: absent  Sensation: normal  Pulse: present  Swelling: none  Effusion: no effusion present    Comments:  Bony crepitus and mild tenderness with compression over patella during flexion arc  Collateral ligaments stable at 0, 30, and 90  Thigh and calf soft and nontender  Ambulates with a slightly antalgic gait on the right without assistive device  Grossly distally neurovascular intact          Observations     Right Knee   Negative for effusion  Physical Exam  Vitals and nursing note reviewed  Constitutional:       Appearance: Normal appearance  She is well-developed  Comments: Body mass index is 32 63 kg/m²  HENT:      Head: Normocephalic and atraumatic  Right Ear: External ear normal       Left Ear: External ear normal    Eyes:      Extraocular Movements: Extraocular movements intact  Conjunctiva/sclera: Conjunctivae normal    Cardiovascular:      Rate and Rhythm: Normal rate  Pulses: Normal pulses  Pulmonary:      Effort: Pulmonary effort is normal    Abdominal:      Palpations: Abdomen is soft  Musculoskeletal:      Cervical back: Normal range of motion  Right knee: No effusion  Instability Tests: Medial Lasha test negative and lateral Lasha test negative  Comments: See ortho exam   Skin:     General: Skin is warm and dry  Neurological:      General: No focal deficit present  Mental Status: She is alert and oriented to person, place, and time  Mental status is at baseline  Psychiatric:         Mood and Affect: Mood normal          Behavior: Behavior normal          Thought Content:  Thought content normal  Judgment: Judgment normal

## 2022-08-19 ENCOUNTER — HOSPITAL ENCOUNTER (OUTPATIENT)
Dept: MAMMOGRAPHY | Facility: HOSPITAL | Age: 67
Discharge: HOME | End: 2022-08-19
Payer: COMMERCIAL

## 2022-08-19 VITALS — BODY MASS INDEX: 32.77 KG/M2 | HEIGHT: 67 IN

## 2022-08-19 DIAGNOSIS — Z12.31 ENCOUNTER FOR SCREENING MAMMOGRAM FOR MALIGNANT NEOPLASM OF BREAST: ICD-10-CM

## 2022-08-19 PROCEDURE — 77063 BREAST TOMOSYNTHESIS BI: CPT

## 2022-08-19 PROCEDURE — 77067 SCR MAMMO BI INCL CAD: CPT

## 2022-08-24 ENCOUNTER — PROCEDURE VISIT (OUTPATIENT)
Dept: OBGYN CLINIC | Facility: CLINIC | Age: 67
End: 2022-08-24
Payer: COMMERCIAL

## 2022-08-24 DIAGNOSIS — G89.29 CHRONIC PAIN OF RIGHT KNEE: ICD-10-CM

## 2022-08-24 DIAGNOSIS — M25.561 CHRONIC PAIN OF RIGHT KNEE: ICD-10-CM

## 2022-08-24 DIAGNOSIS — M17.11 PRIMARY OSTEOARTHRITIS OF RIGHT KNEE: Primary | ICD-10-CM

## 2022-08-24 PROCEDURE — 20610 DRAIN/INJ JOINT/BURSA W/O US: CPT | Performed by: ORTHOPAEDIC SURGERY

## 2022-08-24 RX ORDER — HYALURONATE SODIUM 10 MG/ML
20 SYRINGE (ML) INTRAARTICULAR
Status: COMPLETED | OUTPATIENT
Start: 2022-08-24 | End: 2022-08-24

## 2022-08-24 RX ADMIN — Medication 20 MG: at 15:37

## 2022-08-24 NOTE — PROGRESS NOTES
Assessment/Plan:  1  Primary osteoarthritis of right knee  Large joint arthrocentesis   2  Chronic pain of right knee  Large joint arthrocentesis     Nikki Cobian is a pleasant 41-year-old female presenting today for the 1st of 3 Euflexxa injections for her right knee activity-related pain due to her underlying osteoarthritis  She consented to and underwent the injection as detailed below, which she tolerated well without difficulty or complication  Post injection instructions were provided  We will plan to see her back next week and week after to complete the series  She is considering total knee arthroplasty in March  All questions addressed    Large joint arthrocentesis: R knee  Universal Protocol:  Consent: Verbal consent obtained  Risks and benefits: risks, benefits and alternatives were discussed  Consent given by: patient  Time out: Immediately prior to procedure a "time out" was called to verify the correct patient, procedure, equipment, support staff and site/side marked as required  Timeout called at: 8/24/2022 3:31 PM   Site marked: the operative site was marked  Patient identity confirmed: verbally with patient    Supporting Documentation  Indications: pain   Procedure Details  Location: knee - R knee  Preparation: Patient was prepped and draped in the usual sterile fashion  Needle size: 20 G  Ultrasound guidance: no  Approach: anterolateral  Medications administered: 20 mg Sodium Hyaluronate 20 MG/2ML  Specialty Pharmacy Supplied: received medications from pharmacy  Patient tolerance: patient tolerated the procedure well with no immediate complications  Dressing:  Sterile dressing applied        Subjective: Right knee Euflexxa #1    Patient ID: Jeyonatan Mcclure is a 78 y o  female  Nikki Cobian is a pleasant 41-year-old female presenting today to initiate the series of viscosupplementation for her activity related right knee pain and osteoarthritis    She denies new injury from her previous visit, but continues to be limited in her ability to perform activities daily living and enjoyment      Review of Systems      Past Medical History:   Diagnosis Date    Acquired acanthosis nigricans     Anxiety     Depression 10/3/2017    Essential hypertension     HLD (hyperlipidemia)     Hypertension     Prediabetes     [facility]     last assessed: 10/03/2017    Type 2 diabetes mellitus without complication, without long-term current use of insulin ([facility] ) 3/4/2021       Past Surgical History:   Procedure Laterality Date    APPENDECTOMY      TOTAL ABDOMINAL HYSTERECTOMY  1989    age 27    [facility] STUDY  8/2/2018       Family History   Problem Relation Age of Onset    Diabetes Mother     Stroke Mother     No Known Problems Father     No Known Problems Sister     No Known Problems Daughter     No Known Problems Brother     No Known Problems Brother     No Known Problems Brother     No Known Problems Half-Sister        Social History     Occupational History    Not on file   Tobacco Use    Smoking status: Never Smoker    Smokeless tobacco: Never Used   Vaping Use    Vaping Use: Never used   Substance and Sexual Activity    Alcohol use: No    Drug use: No    Sexual activity: Yes         Current Outpatient Medications:     amLODIPine (NORVASC) 10 mg tablet, Take 1 tablet (10 mg total) by mouth in the morning , Disp: 90 tablet, Rfl: 1    ammonium lactate (LAC-HYDRIN) 12 % lotion, Apply topically daily as needed for dry skin, Disp: 400 g, Rfl: 2    ARIPiprazole (ABILIFY) 5 mg tablet, Take 5 mg by mouth daily, Disp: , Rfl:     clonazePAM (KlonoPIN) 1 mg tablet, TAKE 1 TABLET BY MOUTH ONCE DAILY AS NEEDED FOR ANXIETY, Disp: , Rfl:     Dermatological Products, Misc  (EPICERAM) lotion, APPLY TO THE AFFECTED AREA(S) 1 TO 2 TIMES DAILY, Disp: , Rfl:     DULoxetine (CYMBALTA) 60 mg delayed release capsule, Take 60 mg by mouth in the morning , Disp: , Rfl:     eszopiclone (LUNESTA) tablet, Take 3 mg by mouth daily at bedtime as needed, Disp: , Rfl: 0    SfPsq-OcNhbm-KC-B Cmp-C-Biot (INTEGRA PLUS) CAPS, Take 1 tablet by mouth daily, Disp: , Rfl: 0    rosuvastatin (CRESTOR) 10 MG tablet, TAKE 1 TABLET BY MOUTH MONDAY, WEDNESDAY, AND FRIDAY, Disp: 36 tablet, Rfl: 5    sitaGLIPtin-metFORMIN (Janumet)  MG per tablet, Take 1 tablet by mouth in the morning and 1 tablet in the evening , Disp: 180 tablet, Rfl: 1    solifenacin (VESICARE) 5 mg tablet, Take 1 tablet (5 mg total) by mouth in the morning , Disp: 90 tablet, Rfl: 1    tazarotene (AVAGE) 0 1 % cream, Apply topically daily , Disp: , Rfl: 0    valsartan (DIOVAN) 80 mg tablet, Take 1 tablet (80 mg total) by mouth daily, Disp: 90 tablet, Rfl: 1    WELLBUTRIN  MG 24 hr tablet, Take 1 tablet by mouth daily, Disp: , Rfl: 0    Allergies   Allergen Reactions    Escitalopram Rash    Sulfa Antibiotics Hives and Rash    Food Other (See Comments)     Annotation - [id number]: CORN - causes extreme sleepiness       Objective: There were no vitals filed for this visit  There is no height or weight on file to calculate BMI      Ortho Exam    Physical Exam

## 2022-09-01 ENCOUNTER — PROCEDURE VISIT (OUTPATIENT)
Dept: OBGYN CLINIC | Facility: CLINIC | Age: 67
End: 2022-09-01
Payer: COMMERCIAL

## 2022-09-01 VITALS — TEMPERATURE: 98.2 F

## 2022-09-01 DIAGNOSIS — M17.11 PRIMARY OSTEOARTHRITIS OF RIGHT KNEE: Primary | ICD-10-CM

## 2022-09-01 DIAGNOSIS — M25.561 CHRONIC PAIN OF RIGHT KNEE: ICD-10-CM

## 2022-09-01 DIAGNOSIS — G89.29 CHRONIC PAIN OF RIGHT KNEE: ICD-10-CM

## 2022-09-01 PROCEDURE — 20610 DRAIN/INJ JOINT/BURSA W/O US: CPT | Performed by: ORTHOPAEDIC SURGERY

## 2022-09-01 RX ORDER — HYALURONATE SODIUM 10 MG/ML
20 SYRINGE (ML) INTRAARTICULAR
Status: COMPLETED | OUTPATIENT
Start: 2022-09-01 | End: 2022-09-01

## 2022-09-01 RX ADMIN — Medication 20 MG: at 15:19

## 2022-09-01 NOTE — PROGRESS NOTES
Assessment/Plan:  1  Primary osteoarthritis of right knee  Large joint arthrocentesis: R knee   2  Chronic pain of right knee  Large joint arthrocentesis: R knee     Scribe Attestation    I,:  Rosi Agosto am acting as a scribe while in the presence of the attending physician :       I,:  Yasmany Augustin DO personally performed the services described in this documentation    as scribed in my presence :         Benita Diaz is a pleasant 78year old who presents to the office today for her 2nd 3 Euflexxa injections for her right knee activity related pain due to her underlying osteoarthritis  She consented to and underwent the injection as detailed below, which she tolerated well without difficulty or complication  Post injection instructions were provided  We plan to see her back next week to complete the series  She understood and had no further questions  Large joint arthrocentesis: R knee  Universal Protocol:  Risks and benefits: risks, benefits and alternatives were discussed  Consent given by: patient  Time out: Immediately prior to procedure a "time out" was called to verify the correct patient, procedure, equipment, support staff and site/side marked as required    Timeout called at: 9/1/2022 3:18 PM   Site marked: the operative site was marked  Patient identity confirmed: verbally with patient    Supporting Documentation  Indications: pain   Procedure Details  Location: knee - R knee  Needle size: 20 G  Ultrasound guidance: no  Approach: anterolateral  Medications administered: 20 mg Sodium Hyaluronate 20 MG/2ML  Specialty Pharmacy Supplied: received medications from pharmacy  Patient tolerance: patient tolerated the procedure well with no immediate complications  Dressing:  Sterile dressing applied      Subjective: Right knee Euflexxa #2    Patient ID: Digna Bledsoe is a 78 y o  female who presents to the office today for her 2nd 3 Euflexxa injections for her right knee activity related pain due to her underlying osteoarthritis  She tolerated the 1st Euflexxa injection without any difficulties      Review of Systems      Past Medical History:   Diagnosis Date    Acquired acanthosis nigricans     Anxiety     Depression 10/3/2017    Essential hypertension     HLD (hyperlipidemia)     Hypertension     Prediabetes     Type 2 diabetes mellitus without complication ([facility] [address] )     last assessed: 10/03/2017    Type 2 diabetes mellitus without complication, without long-term current use of insulin ([facility] [address] ) 3/4/2021       Past Surgical History:   Procedure Laterality Date    APPENDECTOMY      TOTAL ABDOMINAL HYSTERECTOMY  1989    age 27    [facility] STUDY  8/2/2018       Family History   Problem Relation Age of Onset    Diabetes Mother     Stroke Mother     No Known Problems Father     No Known Problems Sister     No Known Problems Daughter     No Known Problems Brother     No Known Problems Brother     No Known Problems Brother     No Known Problems Half-Sister        Social History     Occupational History    Not on file   Tobacco Use    Smoking status: Never Smoker    Smokeless tobacco: Never Used   Vaping Use    Vaping Use: Never used   Substance and Sexual Activity    Alcohol use: No    Drug use: No    Sexual activity: Yes         Current Outpatient Medications:     amLODIPine (NORVASC) 10 mg tablet, Take 1 tablet (10 mg total) by mouth in the morning , Disp: 90 tablet, Rfl: 1    ammonium lactate (LAC-HYDRIN) 12 % lotion, Apply topically daily as needed for dry skin, Disp: 400 g, Rfl: 2    ARIPiprazole (ABILIFY) 5 mg tablet, Take 5 mg by mouth daily, Disp: , Rfl:     clonazePAM (KlonoPIN) 1 mg tablet, TAKE 1 TABLET BY MOUTH ONCE DAILY AS NEEDED FOR ANXIETY, Disp: , Rfl:     Dermatological Products, Misc  (EPICERAM) lotion, APPLY TO THE AFFECTED AREA(S) 1 TO 2 TIMES DAILY, Disp: , Rfl:     DULoxetine (CYMBALTA) 60 mg delayed release capsule, Take 60 mg by mouth in the morning , Disp: , Rfl:     eszopiclone (LUNESTA) tablet, Take 3 mg by mouth daily at bedtime as needed, Disp: , Rfl: 0    SbCqr-IoUtlu-RT-B Cmp-C-Biot (INTEGRA PLUS) CAPS, Take 1 tablet by mouth daily, Disp: , Rfl: 0    rosuvastatin (CRESTOR) 10 MG tablet, TAKE 1 TABLET BY MOUTH MONDAY, WEDNESDAY, AND FRIDAY, Disp: 36 tablet, Rfl: 5    sitaGLIPtin-metFORMIN (Janumet)  MG per tablet, Take 1 tablet by mouth in the morning and 1 tablet in the evening , Disp: 180 tablet, Rfl: 1    solifenacin (VESICARE) 5 mg tablet, Take 1 tablet (5 mg total) by mouth in the morning , Disp: 90 tablet, Rfl: 1    tazarotene (AVAGE) 0 1 % cream, Apply topically daily , Disp: , Rfl: 0    valsartan (DIOVAN) 80 mg tablet, Take 1 tablet (80 mg total) by mouth daily, Disp: 90 tablet, Rfl: 1    WELLBUTRIN  MG 24 hr tablet, Take 1 tablet by mouth daily, Disp: , Rfl: 0    Allergies   Allergen Reactions    Escitalopram Rash    Sulfa Antibiotics Hives and Rash    Food Other (See Comments)     Annotation - [id number]: CORN - causes extreme sleepiness       Objective:  Vitals:    09/01/22 1511   Temp: 98 2 °F (36 8 °C)       There is no height or weight on file to calculate BMI      Ortho Exam    Physical Exam

## 2022-09-12 LAB
LEFT EYE DIABETIC RETINOPATHY: NORMAL
RIGHT EYE DIABETIC RETINOPATHY: NORMAL
SEVERITY (EYE EXAM): NORMAL

## 2022-09-16 ENCOUNTER — PROCEDURE VISIT (OUTPATIENT)
Dept: OBGYN CLINIC | Facility: CLINIC | Age: 67
End: 2022-09-16
Payer: COMMERCIAL

## 2022-09-16 VITALS — BODY MASS INDEX: 32.65 KG/M2 | WEIGHT: 210 LBS

## 2022-09-16 DIAGNOSIS — M25.561 CHRONIC PAIN OF RIGHT KNEE: ICD-10-CM

## 2022-09-16 DIAGNOSIS — G89.29 CHRONIC PAIN OF RIGHT KNEE: ICD-10-CM

## 2022-09-16 DIAGNOSIS — M17.11 PRIMARY OSTEOARTHRITIS OF RIGHT KNEE: Primary | ICD-10-CM

## 2022-09-16 PROCEDURE — 20610 DRAIN/INJ JOINT/BURSA W/O US: CPT | Performed by: ORTHOPAEDIC SURGERY

## 2022-09-16 RX ORDER — HYALURONATE SODIUM 10 MG/ML
20 SYRINGE (ML) INTRAARTICULAR
Status: COMPLETED | OUTPATIENT
Start: 2022-09-16 | End: 2022-09-16

## 2022-09-16 RX ADMIN — Medication 20 MG: at 13:01

## 2022-09-16 NOTE — PROGRESS NOTES
Assessment/Plan:  1  Primary osteoarthritis of right knee  Large joint arthrocentesis: R knee   2  Chronic pain of right knee  Large joint arthrocentesis: R knee     Scribe Attestation    I,:  Jordan Mckinley am acting as a scribe while in the presence of the attending physician :       I,:  Kye Mix, DO personally performed the services described in this documentation    as scribed in my presence :         Brent Tracy is a very pleasant 79-year-old female who returns today for the 3rd and final injection in her Euflexxa series for her right knee  She tolerated the 1st 2 injections in the series well  She consented to and underwent the final injection as documented below without issue or complication  This was well tolerated by the patient  Post injection instructions were provided  She understands this can be repeated in 6 months of beneficial   We will see her back as needed  Large joint arthrocentesis: R knee  Universal Protocol:  Consent: Verbal consent obtained  Risks and benefits: risks, benefits and alternatives were discussed  Consent given by: patient  Patient understanding: patient states understanding of the procedure being performed  Site marked: the operative site was marked  Patient identity confirmed: verbally with patient    Supporting Documentation  Indications: pain   Procedure Details  Location: knee - R knee  Preparation: Patient was prepped and draped in the usual sterile fashion  Needle size: 20 G  Ultrasound guidance: no  Approach: anterolateral  Medications administered: 20 mg Sodium Hyaluronate 20 MG/2ML  Specialty Pharmacy Supplied: received medications from pharmacy  Patient tolerance: patient tolerated the procedure well with no immediate complications  Dressing:  Sterile dressing applied        Subjective:  Right knee Euflexxa 3    Patient ID: Kian Veras is a 78 y o  female who returns today for the 3rd and final injection in her Euflexxa series for her right knee    She tolerated the 1st 2 injections in the series well  She denies any new injury trauma      Review of Systems      Past Medical History:   Diagnosis Date    Acquired acanthosis nigricans     Anxiety     Depression 10/3/2017    Essential hypertension     HLD (hyperlipidemia)     Hypertension     Prediabetes     Type 2 diabetes mellitus without complication ([facility] [address] )     last assessed: 10/03/2017    Type 2 diabetes mellitus without complication, without long-term current use of insulin ([facility] [address] ) 3/4/2021       Past Surgical History:   Procedure Laterality Date    APPENDECTOMY      TOTAL ABDOMINAL HYSTERECTOMY  1989    age 27    [facility] STUDY  8/2/2018       Family History   Problem Relation Age of Onset    Diabetes Mother     Stroke Mother     No Known Problems Father     No Known Problems Sister     No Known Problems Daughter     No Known Problems Brother     No Known Problems Brother     No Known Problems Brother     No Known Problems Half-Sister        Social History     Occupational History    Not on file   Tobacco Use    Smoking status: Never Smoker    Smokeless tobacco: Never Used   Vaping Use    Vaping Use: Never used   Substance and Sexual Activity    Alcohol use: No    Drug use: No    Sexual activity: Yes         Current Outpatient Medications:     amLODIPine (NORVASC) 10 mg tablet, Take 1 tablet (10 mg total) by mouth in the morning , Disp: 90 tablet, Rfl: 1    ammonium lactate (LAC-HYDRIN) 12 % lotion, Apply topically daily as needed for dry skin, Disp: 400 g, Rfl: 2    ARIPiprazole (ABILIFY) 5 mg tablet, Take 5 mg by mouth daily, Disp: , Rfl:     clonazePAM (KlonoPIN) 1 mg tablet, TAKE 1 TABLET BY MOUTH ONCE DAILY AS NEEDED FOR ANXIETY, Disp: , Rfl:     Dermatological Products, Misc  (EPICERAM) lotion, APPLY TO THE AFFECTED AREA(S) 1 TO 2 TIMES DAILY, Disp: , Rfl:     DULoxetine (CYMBALTA) 60 mg delayed release capsule, Take 60 mg by mouth in the morning , Disp: , Rfl:     eszopiclone (LUNESTA) tablet, Take 3 mg by mouth daily at bedtime as needed, Disp: , Rfl: 0    YvNya-QyBben-KX-B Cmp-C-Biot (INTEGRA PLUS) CAPS, Take 1 tablet by mouth daily, Disp: , Rfl: 0    rosuvastatin (CRESTOR) 10 MG tablet, TAKE 1 TABLET BY MOUTH MONDAY, WEDNESDAY, AND FRIDAY, Disp: 36 tablet, Rfl: 5    sitaGLIPtin-metFORMIN (Janumet)  MG per tablet, Take 1 tablet by mouth in the morning and 1 tablet in the evening , Disp: 180 tablet, Rfl: 1    solifenacin (VESICARE) 5 mg tablet, Take 1 tablet (5 mg total) by mouth in the morning , Disp: 90 tablet, Rfl: 1    tazarotene (AVAGE) 0 1 % cream, Apply topically daily , Disp: , Rfl: 0    valsartan (DIOVAN) 80 mg tablet, Take 1 tablet (80 mg total) by mouth daily, Disp: 90 tablet, Rfl: 1    WELLBUTRIN  MG 24 hr tablet, Take 1 tablet by mouth daily, Disp: , Rfl: 0    Allergies   Allergen Reactions    Escitalopram Rash    Sulfa Antibiotics Hives and Rash    Food Other (See Comments)     Annotation - [id number]: CORN - causes extreme sleepiness       Objective: There were no vitals filed for this visit  Body mass index is 32 65 kg/m²      Ortho Exam    Physical Exam

## 2022-11-16 ENCOUNTER — VBI (OUTPATIENT)
Dept: ADMINISTRATIVE | Facility: OTHER | Age: 67
End: 2022-11-16

## 2022-11-18 ENCOUNTER — RA CDI HCC (OUTPATIENT)
Dept: OTHER | Facility: HOSPITAL | Age: 67
End: 2022-11-18

## 2022-11-18 NOTE — PROGRESS NOTES
[facility]  coding opportunities          Chart Reviewed number of suggestions sent to Provider: 1     Patients Insurance        Commercial Insurance: [employer]     [id number]

## 2022-11-30 ENCOUNTER — OFFICE VISIT (OUTPATIENT)
Dept: INTERNAL MEDICINE CLINIC | Age: 67
End: 2022-11-30

## 2022-11-30 VITALS
SYSTOLIC BLOOD PRESSURE: 130 MMHG | BODY MASS INDEX: 34.21 KG/M2 | HEART RATE: 81 BPM | HEIGHT: 67 IN | WEIGHT: 218 LBS | OXYGEN SATURATION: 99 % | TEMPERATURE: 97.9 F | DIASTOLIC BLOOD PRESSURE: 80 MMHG

## 2022-11-30 DIAGNOSIS — Z23 FLU VACCINE NEED: ICD-10-CM

## 2022-11-30 DIAGNOSIS — E78.2 MIXED HYPERLIPIDEMIA: ICD-10-CM

## 2022-11-30 DIAGNOSIS — F32.A DEPRESSION, UNSPECIFIED DEPRESSION TYPE: ICD-10-CM

## 2022-11-30 DIAGNOSIS — I10 HYPERTENSION, ESSENTIAL: ICD-10-CM

## 2022-11-30 DIAGNOSIS — E11.9 TYPE 2 DIABETES MELLITUS WITHOUT COMPLICATION, WITHOUT LONG-TERM CURRENT USE OF INSULIN (HCC): Primary | ICD-10-CM

## 2022-11-30 RX ORDER — AMLODIPINE BESYLATE 5 MG/1
5 TABLET ORAL DAILY
COMMUNITY
Start: 2022-10-19

## 2022-11-30 RX ORDER — VALSARTAN 160 MG/1
160 TABLET ORAL DAILY
COMMUNITY
Start: 2022-10-19

## 2022-11-30 NOTE — PROGRESS NOTES
Name: Matilda Mcdonald      : 1955      MRN: [id number]  Encounter Provider: Barb Aldana MD  Encounter Date: 2022   Encounter department: [facility] Derek Burton     1  Type 2 diabetes mellitus without complication, without long-term current use of insulin ([facility] [address] )    2  Hypertension, essential    3  Mixed hyperlipidemia    4  Depression, unspecified depression type    5  Flu vaccine need  -     influenza vaccine, high-dose, PF 0 7 mL (FLUZONE HIGH-DOSE)           Subjective      This is a case of a 50-year-old Afro-American woman who has diabetes her last hemoglobin A1c was 6 6 and blood sugar was 120  Rest of the labs were reviewed and within normal limits  Patient had a EKG which shows right bundle-branch block  I reviewed her medications and the she does not have any specific no side effects from any of them  Review of Systems   Constitutional: Negative for chills, diaphoresis, fatigue, fever and unexpected weight change  HENT: Negative for congestion, nosebleeds, postnasal drip, rhinorrhea, sinus pressure, sinus pain, sneezing and sore throat  Eyes: Negative  Rt entropian   Respiratory: Negative for cough, choking, chest tightness, shortness of breath, wheezing and stridor  Cardiovascular: Negative for chest pain, palpitations and leg swelling  Gastrointestinal: Negative for abdominal distention, abdominal pain, anal bleeding, blood in stool, constipation, diarrhea and nausea  Genitourinary: Negative for difficulty urinating, frequency, pelvic pain and urgency  Musculoskeletal: Positive for arthralgias (rt  knee)  Negative for back pain, gait problem, joint swelling and myalgias  Neurological: Negative for dizziness, seizures, syncope, facial asymmetry, speech difficulty, light-headedness, numbness and headaches  Psychiatric/Behavioral: Positive for dysphoric mood (improved)   Negative for agitation, behavioral problems, confusion, decreased concentration and hallucinations  The patient is not nervous/anxious and is not hyperactive  Current Outpatient Medications on File Prior to Visit   Medication Sig   • amLODIPine (NORVASC) 10 mg tablet Take 1 tablet (10 mg total) by mouth in the morning  • ammonium lactate (LAC-HYDRIN) 12 % lotion Apply topically daily as needed for dry skin   • ARIPiprazole (ABILIFY) 5 mg tablet Take 5 mg by mouth daily   • clonazePAM (KlonoPIN) 1 mg tablet TAKE 1 TABLET BY MOUTH ONCE DAILY AS NEEDED FOR ANXIETY   • Dermatological Products, Misc  (EPICERAM) lotion APPLY TO THE AFFECTED AREA(S) 1 TO 2 TIMES DAILY   • DULoxetine (CYMBALTA) 60 mg delayed release capsule Take 60 mg by mouth in the morning  • eszopiclone (LUNESTA) tablet Take 3 mg by mouth daily at bedtime as needed   • NpSfp-DgTles-DC-B Cmp-C-Biot (INTEGRA PLUS) CAPS Take 1 tablet by mouth daily   • rosuvastatin (CRESTOR) 10 MG tablet TAKE 1 TABLET BY MOUTH MONDAY, WEDNESDAY, AND FRIDAY   • sitaGLIPtin-metFORMIN (Janumet)  MG per tablet Take 1 tablet by mouth in the morning and 1 tablet in the evening  • solifenacin (VESICARE) 5 mg tablet Take 1 tablet (5 mg total) by mouth in the morning     • valsartan (DIOVAN) 80 mg tablet Take 1 tablet (80 mg total) by mouth daily   • amLODIPine (NORVASC) 5 mg tablet Take 5 mg by mouth daily (Patient not taking: Reported on 11/30/2022)   • tazarotene (AVAGE) 0 1 % cream Apply topically daily  (Patient not taking: Reported on 11/30/2022)   • valsartan (DIOVAN) 160 mg tablet Take 160 mg by mouth daily   • WELLBUTRIN  MG 24 hr tablet Take 1 tablet by mouth daily (Patient not taking: Reported on 11/30/2022)       Objective     /80 (BP Location: Left arm, Patient Position: Sitting, Cuff Size: Adult)   Pulse 81   Temp 97 9 °F (36 6 °C) (Temporal)   Ht 5' 7 25" (1 708 m)   Wt 98 9 kg (218 lb)   SpO2 99%   BMI 33 89 kg/m²     Physical Exam  Constitutional:       Appearance: Normal appearance  She is obese  HENT:      Head: Normocephalic and atraumatic  Right Ear: External ear normal  There is no impacted cerumen  Left Ear: External ear normal  There is no impacted cerumen  Nose: No congestion  Mouth/Throat:      Pharynx: No oropharyngeal exudate or posterior oropharyngeal erythema  Eyes:      Extraocular Movements: Extraocular movements intact  Cardiovascular:      Rate and Rhythm: Normal rate and regular rhythm  Heart sounds: No murmur heard  No friction rub  Pulmonary:      Effort: Pulmonary effort is normal  No respiratory distress  Breath sounds: No wheezing or rhonchi  Abdominal:      General: Abdomen is flat  There is no distension  Palpations: There is no mass  Tenderness: There is no abdominal tenderness  Musculoskeletal:         General: No swelling, tenderness or deformity  Cervical back: No rigidity or tenderness  Right lower leg: No edema  Left lower leg: No edema  Lymphadenopathy:      Cervical: No cervical adenopathy  Skin:     General: Skin is warm and dry  Coloration: Skin is not jaundiced or pale  Findings: No bruising  Neurological:      General: No focal deficit present  Mental Status: She is alert  Mental status is at baseline  Cranial Nerves: No cranial nerve deficit  Sensory: No sensory deficit  Motor: No weakness  Psychiatric:         Mood and Affect: Mood normal          Behavior: Behavior normal          Thought Content:  Thought content normal          Judgment: Judgment normal        Margarita Iqbal MD

## 2022-12-21 DIAGNOSIS — R73.01 IMPAIRED FASTING GLUCOSE: ICD-10-CM

## 2022-12-22 RX ORDER — SITAGLIPTIN AND METFORMIN HYDROCHLORIDE 1000; 50 MG/1; MG/1
1 TABLET, FILM COATED ORAL 2 TIMES DAILY
Qty: 180 TABLET | Refills: 0 | Status: SHIPPED | OUTPATIENT
Start: 2022-12-22

## 2022-12-27 DIAGNOSIS — E78.2 MIXED HYPERLIPIDEMIA: ICD-10-CM

## 2022-12-28 RX ORDER — ROSUVASTATIN CALCIUM 10 MG/1
TABLET, COATED ORAL
Qty: 30 TABLET | Refills: 5 | Status: SHIPPED | OUTPATIENT
Start: 2022-12-28

## 2023-02-12 DIAGNOSIS — N32.81 OAB (OVERACTIVE BLADDER): ICD-10-CM

## 2023-02-13 RX ORDER — SOLIFENACIN SUCCINATE 5 MG/1
5 TABLET, FILM COATED ORAL DAILY
Qty: 90 TABLET | Refills: 0 | Status: SHIPPED | OUTPATIENT
Start: 2023-02-13

## 2023-03-06 DIAGNOSIS — M17.11 PRIMARY OSTEOARTHRITIS OF RIGHT KNEE: Primary | ICD-10-CM

## 2023-03-17 ENCOUNTER — PROCEDURE VISIT (OUTPATIENT)
Dept: OBGYN CLINIC | Facility: CLINIC | Age: 68
End: 2023-03-17

## 2023-03-17 VITALS
DIASTOLIC BLOOD PRESSURE: 74 MMHG | SYSTOLIC BLOOD PRESSURE: 117 MMHG | HEIGHT: 67 IN | WEIGHT: 206.6 LBS | TEMPERATURE: 98.3 F | HEART RATE: 85 BPM | BODY MASS INDEX: 32.43 KG/M2

## 2023-03-17 DIAGNOSIS — G89.29 CHRONIC PAIN OF RIGHT KNEE: ICD-10-CM

## 2023-03-17 DIAGNOSIS — M25.561 CHRONIC PAIN OF RIGHT KNEE: ICD-10-CM

## 2023-03-17 DIAGNOSIS — M17.11 PRIMARY OSTEOARTHRITIS OF RIGHT KNEE: Primary | ICD-10-CM

## 2023-03-17 RX ORDER — HYALURONATE SODIUM 10 MG/ML
20 SYRINGE (ML) INTRAARTICULAR
Status: COMPLETED | OUTPATIENT
Start: 2023-03-17 | End: 2023-03-17

## 2023-03-17 RX ADMIN — Medication 20 MG: at 13:20

## 2023-03-17 NOTE — PROGRESS NOTES
Assessment/Plan:  1  Primary osteoarthritis of right knee  Large joint arthrocentesis      2  Chronic pain of right knee  Large joint arthrocentesis        Scribe Attestation    I,:  Oly Rodarte PA-C am acting as a scribe while in the presence of the attending physician :       I,:  Jazmin Valadez DO personally performed the services described in this documentation    as scribed in my presence :         Lorena Ding is a pleasant 65-year-old presenting today for follow-up of her active related right knee pain due to her underlying osteoarthritis  She did very well from the previous viscosupplementation and had approximately 5 and half months worth of relief before she had a return of her activity related discomfort  Therefore, we feel it reasonable to reinitiate the series here today  She consented to and underwent the first of 3 Euflexxa injections for her right knee as detailed below, which she tolerated well without difficulty or complication  Postinjection instructions were provided  We will see her back next week and the week after to complete the series  All questions addressed    Large joint arthrocentesis: R knee  Universal Protocol:  Consent: Verbal consent obtained  Risks and benefits: risks, benefits and alternatives were discussed  Consent given by: patient  Time out: Immediately prior to procedure a "time out" was called to verify the correct patient, procedure, equipment, support staff and site/side marked as required    Timeout called at: 3/17/2023 1:08 PM   Site marked: the operative site was marked  Patient identity confirmed: verbally with patient    Supporting Documentation  Indications: pain   Procedure Details  Location: knee - R knee  Preparation: Patient was prepped and draped in the usual sterile fashion  Needle size: 20 G  Ultrasound guidance: no  Approach: anterolateral  Medications administered: 20 mg Sodium Hyaluronate 20 MG/2ML  Specialty Pharmacy Supplied: received medications from pharmacy  Patient tolerance: patient tolerated the procedure well with no immediate complications  Dressing:  Sterile dressing applied          Subjective: Right knee follow up    Patient ID: Luis Humphries is a 75 y o  female very known to our practice for her active related right knee pain due to her underlying osteoarthritis  She completed Euflexxa in September of last year and reports that it did provide significant benefit up until the past few weeks  She denies any new injury, but has had a return of her activity related discomfort which is now 5 out of 10 on a daily basis and worse with activity  Given the success she had previously with viscosupplementation, we feel it reasonable to repeat    Review of Systems   Constitutional: Positive for activity change  HENT: Negative  Eyes: Negative  Respiratory: Negative  Cardiovascular: Negative  Gastrointestinal: Negative  Endocrine: Negative  Genitourinary: Negative  Musculoskeletal: Positive for arthralgias, gait problem, joint swelling and myalgias  Skin: Negative  Allergic/Immunologic: Negative  Hematological: Negative  Psychiatric/Behavioral: Negative        Past Medical History:   Diagnosis Date   • Acquired acanthosis nigricans    • Anxiety    • Depression 10/3/2017   • Essential hypertension    • HLD (hyperlipidemia)    • Hypertension    • Prediabetes    • Type 2 diabetes mellitus without complication ([facility] [address] )     last assessed: 10/03/2017   • Type 2 diabetes mellitus without complication, without long-term current use of insulin ([facility] [address] ) 3/4/2021       Past Surgical History:   Procedure Laterality Date   • APPENDECTOMY     • TOTAL ABDOMINAL HYSTERECTOMY  1989    age 27   • [facility] STUDY  8/2/2018       Family History   Problem Relation Age of Onset   • Diabetes Mother    • Stroke Mother    • No Known Problems Father    • No Known Problems Sister    • No Known Problems Daughter    • No Known Problems Brother    • No Known Problems Brother    • No Known Problems Brother    • No Known Problems Half-Sister        Social History     Occupational History   • Not on file   Tobacco Use   • Smoking status: Never   • Smokeless tobacco: Never   Vaping Use   • Vaping Use: Never used   Substance and Sexual Activity   • Alcohol use: No   • Drug use: No   • Sexual activity: Yes         Current Outpatient Medications:   •  amLODIPine (NORVASC) 10 mg tablet, Take 1 tablet (10 mg total) by mouth in the morning , Disp: 90 tablet, Rfl: 1  •  ammonium lactate (LAC-HYDRIN) 12 % lotion, Apply topically daily as needed for dry skin, Disp: 400 g, Rfl: 2  •  ARIPiprazole (ABILIFY) 5 mg tablet, Take 5 mg by mouth daily, Disp: , Rfl:   •  clonazePAM (KlonoPIN) 1 mg tablet, TAKE 1 TABLET BY MOUTH ONCE DAILY AS NEEDED FOR ANXIETY, Disp: , Rfl:   •  Dermatological Products, Misc  (EPICERAM) lotion, APPLY TO THE AFFECTED AREA(S) 1 TO 2 TIMES DAILY, Disp: , Rfl:   •  DULoxetine (CYMBALTA) 60 mg delayed release capsule, Take 60 mg by mouth in the morning , Disp: , Rfl:   •  eszopiclone (LUNESTA) tablet, Take 3 mg by mouth daily at bedtime as needed, Disp: , Rfl: 0  •  QlEsw-KuJsun-VU-B Cmp-C-Biot (INTEGRA PLUS) CAPS, Take 1 tablet by mouth daily, Disp: , Rfl: 0  •  rosuvastatin (CRESTOR) 10 MG tablet, TAKE 1 TABLET BY MOUTH MONDAY, WEDNESDAY, AND FRIDAY, Disp: 30 tablet, Rfl: 5  •  sitaGLIPtin-metFORMIN (Janumet)  MG per tablet, Take 1 tablet by mouth 2 (two) times a day, Disp: 180 tablet, Rfl: 0  •  solifenacin (VESICARE) 5 mg tablet, Take 1 tablet (5 mg total) by mouth daily, Disp: 90 tablet, Rfl: 0  •  valsartan (DIOVAN) 160 mg tablet, Take 160 mg by mouth daily, Disp: , Rfl:   •  amLODIPine (NORVASC) 5 mg tablet, Take 5 mg by mouth daily (Patient not taking: Reported on 11/30/2022), Disp: , Rfl:   •  tazarotene (AVAGE) 0 1 % cream, Apply topically daily  (Patient not taking: Reported on 11/30/2022), Disp: , Rfl: 0  • valsartan (DIOVAN) 80 mg tablet, Take 1 tablet (80 mg total) by mouth daily, Disp: 90 tablet, Rfl: 1  •  WELLBUTRIN  MG 24 hr tablet, Take 1 tablet by mouth daily (Patient not taking: Reported on 11/30/2022), Disp: , Rfl: 0    Allergies   Allergen Reactions   • Escitalopram Rash   • Sulfa Antibiotics Hives and Rash   • Food Other (See Comments)     [facility] - [id number]: CORN - causes extreme sleepiness       Objective:  Vitals:    03/17/23 1300   BP: 117/74   Pulse: 85   Temp: 98 3 °F (36 8 °C)       Body mass index is 32 12 kg/m²  Right Knee Exam     Muscle Strength   The patient has normal right knee strength  Tenderness   The patient is experiencing tenderness in the medial joint line and MCL  Range of Motion   Extension:  0 normal   Flexion:  120 normal     Tests   Lasha:  Medial - negative Lateral - negative  Varus: negative Valgus: negative  Drawer:  Anterior - negative    Posterior - negative  Patellar apprehension: negative    Other   Erythema: absent  Scars: absent  Sensation: normal  Pulse: present  Swelling: none  Effusion: no effusion present    Comments:  Bony crepitus and mild tenderness with compression over patella during flexion arc  Collateral ligaments stable at 0, 30, and 90  Thigh and calf soft and nontender  Ambulates with a slightly antalgic gait on the right without assistive device  Grossly distally neurovascular intact          Observations     Right Knee   Negative for effusion  Physical Exam  Vitals and nursing note reviewed  Constitutional:       Appearance: Normal appearance  She is well-developed  Comments: Body mass index is 32 12 kg/m²  HENT:      Head: Normocephalic and atraumatic  Right Ear: External ear normal       Left Ear: External ear normal    Eyes:      Extraocular Movements: Extraocular movements intact  Conjunctiva/sclera: Conjunctivae normal    Cardiovascular:      Rate and Rhythm: Normal rate  Pulses: Normal pulses  Pulmonary:      Effort: Pulmonary effort is normal    Musculoskeletal:      Cervical back: Normal range of motion  Right knee: No effusion  Instability Tests: Medial Lasha test negative and lateral Lasha test negative  Comments: See ortho exam   Skin:     General: Skin is warm and dry  Neurological:      General: No focal deficit present  Mental Status: She is alert and oriented to person, place, and time  Mental status is at baseline  Psychiatric:         Mood and Affect: Mood normal          Behavior: Behavior normal          Thought Content: Thought content normal          Judgment: Judgment normal            This document was created using speech voice recognition software  Grammatical errors, random word insertions, pronoun errors, and incomplete sentences are an occasional consequence of this system due to software limitations, ambient noise, and hardware issues  Any formal questions or concerns about content, text, or information contained within the body of this dictation should be directly addressed to the provider for clarification

## 2023-03-21 ENCOUNTER — RA CDI HCC (OUTPATIENT)
Dept: OTHER | Facility: HOSPITAL | Age: 68
End: 2023-03-21

## 2023-03-21 NOTE — PROGRESS NOTES
[facility]  coding opportunities       Chart reviewed, no opportunity found: CHART REVIEWED, NO OPPORTUNITY FOUND        Patients Insurance        Commercial Insurance: [employer]

## 2023-03-29 ENCOUNTER — OFFICE VISIT (OUTPATIENT)
Dept: INTERNAL MEDICINE CLINIC | Age: 68
End: 2023-03-29

## 2023-03-29 VITALS
HEART RATE: 83 BPM | TEMPERATURE: 97.5 F | BODY MASS INDEX: 31.92 KG/M2 | DIASTOLIC BLOOD PRESSURE: 78 MMHG | HEIGHT: 68 IN | OXYGEN SATURATION: 96 % | SYSTOLIC BLOOD PRESSURE: 148 MMHG | WEIGHT: 210.6 LBS

## 2023-03-29 DIAGNOSIS — I10 HYPERTENSION, ESSENTIAL: ICD-10-CM

## 2023-03-29 DIAGNOSIS — Z12.31 ENCOUNTER FOR SCREENING MAMMOGRAM FOR MALIGNANT NEOPLASM OF BREAST: Primary | ICD-10-CM

## 2023-03-29 DIAGNOSIS — E11.9 TYPE 2 DIABETES MELLITUS WITHOUT COMPLICATION, WITHOUT LONG-TERM CURRENT USE OF INSULIN (HCC): ICD-10-CM

## 2023-03-29 DIAGNOSIS — R73.03 PREDIABETES: ICD-10-CM

## 2023-03-29 DIAGNOSIS — E56.9 VITAMIN DEFICIENCY: ICD-10-CM

## 2023-03-29 DIAGNOSIS — R73.01 IMPAIRED FASTING GLUCOSE: ICD-10-CM

## 2023-03-29 DIAGNOSIS — E61.1 IRON DEFICIENCY: ICD-10-CM

## 2023-03-29 DIAGNOSIS — I83.813 VARICOSE VEINS OF BILATERAL LOWER EXTREMITIES WITH PAIN: ICD-10-CM

## 2023-03-29 DIAGNOSIS — R53.83 TIRED: ICD-10-CM

## 2023-03-29 RX ORDER — SITAGLIPTIN AND METFORMIN HYDROCHLORIDE 1000; 50 MG/1; MG/1
1 TABLET, FILM COATED ORAL 2 TIMES DAILY
Qty: 180 TABLET | Refills: 1 | Status: SHIPPED | OUTPATIENT
Start: 2023-03-29

## 2023-03-29 NOTE — PATIENT INSTRUCTIONS
Blood work for next visit  Wear compression stocking 15 pressure  Continue diet  Visit in 4-5 months  Kam stephenson

## 2023-03-29 NOTE — PROGRESS NOTES
Name: Andrew Wilkes      : 1955      MRN: [id number]  Encounter Provider: Gena Gentile MD  Encounter Date: 3/29/2023   Encounter department: [address] [place] [address]     1  Encounter for screening mammogram for malignant neoplasm of breast  Comments:  needs to schulde Sangeetha  Orders:  -     Mammo screening bilateral w 3d & cad; Future; Expected date: 2023    2  Impaired fasting glucose  Comments:  f/u labs due  continue with daily exercise   Blod in 120 range   to check labs  Orders:  -     sitaGLIPtin-metFORMIN (Janumet)  MG per tablet; Take 1 tablet by mouth 2 (two) times a day    3  Type 2 diabetes mellitus without complication, without long-term current use of insulin (HCC)  Comments:  to check HgbA1c  Orders:  -     HEMOGLOBIN A1C W/ EAG ESTIMATION; Future    4  Vitamin deficiency  Comments:  Vit  D deficiency to check labs  Orders:  -     Vitamin D Panel; Future    5  Iron deficiency  Comments:  taking Iron  to check labs  Orders:  -     Iron; Future  -     Iron Panel (Includes Ferritin, Iron Sat%, Iron, and TIBC); Future    6  Tired  Comments:  to check TSH    7  Varicose veins of bilateral lower extremities with pain  Comments:  to wear compression stockings    8  Hypertension, essential  Comments:  slightly elevated today, to watch salt in diet    9  Prediabetes  Comments:  on diet watching sugar in diet           Subjective        Patient is somewhat fatigued she is diabetic doing well on her diet  Review of Systems   Constitutional: Positive for fatigue  Negative for appetite change, chills, diaphoresis and fever  HENT: Negative for congestion, hearing loss, postnasal drip, rhinorrhea, sinus pressure and sinus pain  Eyes: Negative  Respiratory: Negative for cough, choking, chest tightness, shortness of breath, wheezing and stridor  Cardiovascular: Negative for chest pain, palpitations and leg swelling (varicosities)  Gastrointestinal: Negative for abdominal distention, abdominal pain, anal bleeding, blood in stool, constipation, diarrhea, nausea and rectal pain  Genitourinary: Negative for difficulty urinating, dysuria, flank pain, frequency, hematuria, pelvic pain and urgency  Musculoskeletal: Negative  Skin: Positive for color change  Neurological: Negative for dizziness, seizures, facial asymmetry, speech difficulty, light-headedness, numbness and headaches  Psychiatric/Behavioral: Negative for agitation, confusion, decreased concentration, dysphoric mood, hallucinations and self-injury  The patient is not nervous/anxious and is not hyperactive  Current Outpatient Medications on File Prior to Visit   Medication Sig   • amLODIPine (NORVASC) 10 mg tablet Take 1 tablet (10 mg total) by mouth in the morning  • ammonium lactate (LAC-HYDRIN) 12 % lotion Apply topically daily as needed for dry skin   • ARIPiprazole (ABILIFY) 5 mg tablet Take 5 mg by mouth daily   • clonazePAM (KlonoPIN) 1 mg tablet TAKE 1 TABLET BY MOUTH ONCE DAILY AS NEEDED FOR ANXIETY   • DULoxetine (CYMBALTA) 60 mg delayed release capsule Take 60 mg by mouth in the morning     • eszopiclone (LUNESTA) tablet Take 3 mg by mouth daily at bedtime as needed   • AwJzy-BwRbhh-KA-B Cmp-C-Biot (INTEGRA PLUS) CAPS Take 1 tablet by mouth daily   • rosuvastatin (CRESTOR) 10 MG tablet TAKE 1 TABLET BY MOUTH MONDAY, WEDNESDAY, AND FRIDAY   • solifenacin (VESICARE) 5 mg tablet Take 1 tablet (5 mg total) by mouth daily   • valsartan (DIOVAN) 160 mg tablet Take 160 mg by mouth daily   • WELLBUTRIN  MG 24 hr tablet Take 1 tablet by mouth daily   • [DISCONTINUED] sitaGLIPtin-metFORMIN (Janumet)  MG per tablet Take 1 tablet by mouth 2 (two) times a day   • amLODIPine (NORVASC) 5 mg tablet Take 5 mg by mouth daily   • tazarotene (AVAGE) 0 1 % cream Apply topically daily       Objective     /78 (BP Location: Left arm, Patient Position: Sitting, "Cuff Size: Large)   Pulse 83   Temp 97 5 °F (36 4 °C) (Tympanic)   Ht 5' 7 52\" (1 715 m) Comment: wearing shoes  Wt 95 5 kg (210 lb 9 6 oz) Comment: wearing shoes  SpO2 96%   BMI 32 48 kg/m²     Physical Exam  Constitutional:       Appearance: Normal appearance  HENT:      Head: Normocephalic and atraumatic  Right Ear: Tympanic membrane normal       Left Ear: Tympanic membrane normal       Mouth/Throat:      Mouth: Mucous membranes are moist    Eyes:      Extraocular Movements: Extraocular movements intact  Pupils: Pupils are equal, round, and reactive to light  Cardiovascular:      Rate and Rhythm: Normal rate and regular rhythm  Heart sounds: No murmur heard  Pulmonary:      Effort: No respiratory distress  Breath sounds: No stridor  No wheezing or rhonchi  Abdominal:      General: There is no distension  Palpations: There is no mass  Tenderness: There is no abdominal tenderness  Hernia: No hernia is present  Musculoskeletal:         General: Normal range of motion  Cervical back: No rigidity  Right lower leg: Edema (trace) present  Left lower leg: Edema present  Comments: Getting synvisc in rt  knee   Skin:     General: Skin is warm  Coloration: Skin is not jaundiced or pale  Findings: No bruising or erythema  Comments: vitligo   Neurological:      Motor: No weakness  Gait: Gait normal    Psychiatric:         Mood and Affect: Mood normal          Behavior: Behavior normal          Thought Content:  Thought content normal          Judgment: Judgment normal        Quoc Medrano MD  "

## 2023-03-31 ENCOUNTER — PROCEDURE VISIT (OUTPATIENT)
Dept: OBGYN CLINIC | Facility: CLINIC | Age: 68
End: 2023-03-31

## 2023-03-31 VITALS — DIASTOLIC BLOOD PRESSURE: 84 MMHG | SYSTOLIC BLOOD PRESSURE: 126 MMHG | HEART RATE: 87 BPM | TEMPERATURE: 98.2 F

## 2023-03-31 DIAGNOSIS — M25.561 CHRONIC PAIN OF RIGHT KNEE: ICD-10-CM

## 2023-03-31 DIAGNOSIS — M17.11 PRIMARY OSTEOARTHRITIS OF RIGHT KNEE: Primary | ICD-10-CM

## 2023-03-31 DIAGNOSIS — G89.29 CHRONIC PAIN OF RIGHT KNEE: ICD-10-CM

## 2023-03-31 RX ORDER — HYALURONATE SODIUM 10 MG/ML
20 SYRINGE (ML) INTRAARTICULAR
Status: COMPLETED | OUTPATIENT
Start: 2023-03-31 | End: 2023-03-31

## 2023-03-31 RX ADMIN — Medication 20 MG: at 13:20

## 2023-03-31 NOTE — PROGRESS NOTES
"Assessment/Plan:  1  Primary osteoarthritis of right knee  Large joint arthrocentesis      2  Chronic pain of right knee  Large joint arthrocentesis        Franco Barboza is a pleasant 76-year-old presenting today for the second of 3 Euflexxa injections for her right knee osteoarthritis and active related pain  She consented to and underwent the injection as detailed below, which she tolerated well without difficulty or complication  Postinjection instructions were provided  We will see her back next week to complete the series    Large joint arthrocentesis: R knee  Universal Protocol:  Consent: Verbal consent obtained  Risks and benefits: risks, benefits and alternatives were discussed  Consent given by: patient  Time out: Immediately prior to procedure a \"time out\" was called to verify the correct patient, procedure, equipment, support staff and site/side marked as required  Timeout called at: 3/31/2023 1:19 PM   Site marked: the operative site was marked  Patient identity confirmed: verbally with patient    Supporting Documentation  Indications: pain   Procedure Details  Location: knee - R knee  Preparation: Patient was prepped and draped in the usual sterile fashion  Needle size: 20 G  Ultrasound guidance: no  Approach: anterolateral  Medications administered: 20 mg Sodium Hyaluronate 20 MG/2ML  Specialty Pharmacy Supplied: received medications from pharmacy  Patient tolerance: patient tolerated the procedure well with no immediate complications  Dressing:  Sterile dressing applied        Subjective: Right knee Euflexxa #2    Patient ID: Tia Best is a 75 y o  female presenting for the second of 3 Euflexxa injections for her right knee    She denies new injury from her previous visit    Review of Systems      Past Medical History:   Diagnosis Date   • Acquired acanthosis nigricans    • Anxiety    • Depression 10/3/2017   • Essential hypertension    • HLD (hyperlipidemia)    • Hypertension    • Prediabetes  " • Type 2 diabetes mellitus without complication ([facility] )     last assessed: 10/03/2017   • Type 2 diabetes mellitus without complication, without long-term current use of insulin ([facility] ) 3/4/2021       Past Surgical History:   Procedure Laterality Date   • APPENDECTOMY     • TOTAL ABDOMINAL HYSTERECTOMY  1989    age 27   • [facility] STUDY  2018       Family History   Problem Relation Age of Onset   • Diabetes Mother            • Stroke Mother    • Depression Mother    • No Known Problems Father    • No Known Problems Sister    • No Known Problems Daughter    • No Known Problems Brother    • No Known Problems Brother    • No Known Problems Brother    • No Known Problems Half-Sister    • Schizoaffective Disorder  Cousin         Aunt       Social History     Occupational History   • Not on file   Tobacco Use   • Smoking status: Never   • Smokeless tobacco: Never   Vaping Use   • Vaping Use: Never used   Substance and Sexual Activity   • Alcohol use: No   • Drug use: No   • Sexual activity: Not Currently     Partners: Male         Current Outpatient Medications:   •  amLODIPine (NORVASC) 10 mg tablet, Take 1 tablet (10 mg total) by mouth in the morning , Disp: 90 tablet, Rfl: 1  •  amLODIPine (NORVASC) 5 mg tablet, Take 5 mg by mouth daily, Disp: , Rfl:   •  ammonium lactate (LAC-HYDRIN) 12 % lotion, Apply topically daily as needed for dry skin, Disp: 400 g, Rfl: 2  •  ARIPiprazole (ABILIFY) 5 mg tablet, Take 5 mg by mouth daily, Disp: , Rfl:   •  clonazePAM (KlonoPIN) 1 mg tablet, TAKE 1 TABLET BY MOUTH ONCE DAILY AS NEEDED FOR ANXIETY, Disp: , Rfl:   •  DULoxetine (CYMBALTA) 60 mg delayed release capsule, Take 60 mg by mouth in the morning , Disp: , Rfl:   •  eszopiclone (LUNESTA) tablet, Take 3 mg by mouth daily at bedtime as needed, Disp: , Rfl: 0  •  BdSel-KiSmky-DA-B Cmp-C-Biot (INTEGRA PLUS) CAPS, Take 1 tablet by mouth daily, Disp: , Rfl: 0  •  rosuvastatin (CRESTOR) 10 MG tablet, TAKE 1 TABLET BY MOUTH MONDAY, WEDNESDAY, AND FRIDAY, Disp: 30 tablet, Rfl: 5  •  sitaGLIPtin-metFORMIN (Janumet)  MG per tablet, Take 1 tablet by mouth 2 (two) times a day, Disp: 180 tablet, Rfl: 1  •  solifenacin (VESICARE) 5 mg tablet, Take 1 tablet (5 mg total) by mouth daily, Disp: 90 tablet, Rfl: 0  •  tazarotene (AVAGE) 0 1 % cream, Apply topically daily, Disp: , Rfl: 0  •  valsartan (DIOVAN) 160 mg tablet, Take 160 mg by mouth daily, Disp: , Rfl:   •  WELLBUTRIN  MG 24 hr tablet, Take 1 tablet by mouth daily, Disp: , Rfl: 0    Allergies   Allergen Reactions   • Escitalopram Rash   • Sulfa Antibiotics Hives and Rash   • Food Other (See Comments)     Annotation - [id number]: CORN - causes extreme sleepiness       Objective:  Vitals:    03/31/23 1303   BP: 126/84   Pulse: 87   Temp: 98 2 °F (36 8 °C)       There is no height or weight on file to calculate BMI  Ortho Exam    Physical Exam    This document was created using speech voice recognition software  Grammatical errors, random word insertions, pronoun errors, and incomplete sentences are an occasional consequence of this system due to software limitations, ambient noise, and hardware issues  Any formal questions or concerns about content, text, or information contained within the body of this dictation should be directly addressed to the provider for clarification

## 2023-05-16 DIAGNOSIS — N32.81 OAB (OVERACTIVE BLADDER): ICD-10-CM

## 2023-05-16 DIAGNOSIS — R73.01 IMPAIRED FASTING GLUCOSE: ICD-10-CM

## 2023-05-16 RX ORDER — SOLIFENACIN SUCCINATE 5 MG/1
5 TABLET, FILM COATED ORAL DAILY
Qty: 90 TABLET | Refills: 1 | Status: SHIPPED | OUTPATIENT
Start: 2023-05-16

## 2023-05-17 RX ORDER — SITAGLIPTIN AND METFORMIN HYDROCHLORIDE 1000; 50 MG/1; MG/1
1 TABLET, FILM COATED ORAL 2 TIMES DAILY
Qty: 180 TABLET | Refills: 1 | Status: SHIPPED | OUTPATIENT
Start: 2023-05-17 | End: 2023-05-26 | Stop reason: SDUPTHER

## 2023-05-25 ENCOUNTER — TELEPHONE (OUTPATIENT)
Dept: INTERNAL MEDICINE CLINIC | Facility: OTHER | Age: 68
End: 2023-05-25

## 2023-05-25 DIAGNOSIS — R73.01 IMPAIRED FASTING GLUCOSE: ICD-10-CM

## 2023-05-25 NOTE — TELEPHONE ENCOUNTER
Per patient, she states the Janumet requires a prior Анна Burnette  I do not see that we have received anything in media

## 2023-05-26 RX ORDER — SITAGLIPTIN AND METFORMIN HYDROCHLORIDE 1000; 50 MG/1; MG/1
1 TABLET, FILM COATED ORAL 2 TIMES DAILY
Qty: 180 TABLET | Refills: 1 | Status: SHIPPED | OUTPATIENT
Start: 2023-05-26

## 2023-05-26 NOTE — TELEPHONE ENCOUNTER
The pharmacist at [address] [place] [address] recommended we send a new RX for Radhaineton because RX form 5/17/23 was cancelled  This will trigger the need for a new prior auth  RX sent

## 2023-06-01 DIAGNOSIS — R73.01 IMPAIRED FASTING GLUCOSE: ICD-10-CM

## 2023-06-05 DIAGNOSIS — R73.01 IMPAIRED FASTING GLUCOSE: ICD-10-CM

## 2023-06-05 RX ORDER — SITAGLIPTIN AND METFORMIN HYDROCHLORIDE 1000; 50 MG/1; MG/1
1 TABLET, FILM COATED ORAL 2 TIMES DAILY
Qty: 180 TABLET | Refills: 1 | Status: SHIPPED | OUTPATIENT
Start: 2023-06-05

## 2023-06-05 RX ORDER — SITAGLIPTIN AND METFORMIN HYDROCHLORIDE 1000; 50 MG/1; MG/1
1 TABLET, FILM COATED ORAL 2 TIMES DAILY
Qty: 180 TABLET | Refills: 0 | Status: CANCELLED | OUTPATIENT
Start: 2023-06-05

## 2023-07-26 ENCOUNTER — RA CDI HCC (OUTPATIENT)
Dept: OTHER | Facility: HOSPITAL | Age: 68
End: 2023-07-26

## 2023-07-26 NOTE — PROGRESS NOTES
[facility] coding opportunities          Chart Reviewed number of suggestions sent to Provider: 1  E11.22     Patients Insurance        Commercial Insurance: [employer]

## 2023-07-29 LAB
1,25(OH)2D SERPL-MCNC: 52 PG/ML (ref 18–72)
1,25(OH)2D2 SERPL-MCNC: <8 PG/ML
1,25(OH)2D3 SERPL-MCNC: 52 PG/ML
25(OH)D3+25(OH)D2 SERPL-MCNC: 54 NG/ML (ref 30–100)
EST. AVERAGE GLUCOSE BLD GHB EST-MCNC: 126 MG/DL
EST. AVERAGE GLUCOSE BLD GHB EST-SCNC: 7 MMOL/L
FERRITIN SERPL-MCNC: 36 NG/ML (ref 16–288)
HBA1C MFR BLD: 6 % OF TOTAL HGB
IRON SATN MFR SERPL: 19 % (CALC) (ref 16–45)
IRON SERPL-MCNC: 63 MCG/DL (ref 45–160)
TIBC SERPL-MCNC: 334 MCG/DL (CALC) (ref 250–450)
VITAMIN D2 SERPL-MCNC: <4 NG/ML
VITAMIN D3 SERPL-MCNC: 54 NG/ML

## 2023-09-05 ENCOUNTER — OFFICE VISIT (OUTPATIENT)
Dept: INTERNAL MEDICINE CLINIC | Age: 68
End: 2023-09-05
Payer: COMMERCIAL

## 2023-09-05 VITALS
BODY MASS INDEX: 29.25 KG/M2 | HEART RATE: 73 BPM | OXYGEN SATURATION: 96 % | SYSTOLIC BLOOD PRESSURE: 122 MMHG | TEMPERATURE: 97.9 F | WEIGHT: 193 LBS | HEIGHT: 68 IN | DIASTOLIC BLOOD PRESSURE: 80 MMHG

## 2023-09-05 DIAGNOSIS — I10 HYPERTENSION, ESSENTIAL: ICD-10-CM

## 2023-09-05 DIAGNOSIS — E78.2 MIXED HYPERLIPIDEMIA: ICD-10-CM

## 2023-09-05 DIAGNOSIS — F32.A DEPRESSION, UNSPECIFIED DEPRESSION TYPE: ICD-10-CM

## 2023-09-05 DIAGNOSIS — E11.9 TYPE 2 DIABETES MELLITUS WITHOUT COMPLICATION, WITHOUT LONG-TERM CURRENT USE OF INSULIN (HCC): Primary | ICD-10-CM

## 2023-09-05 PROBLEM — N18.2 TYPE 2 DIABETES MELLITUS WITH STAGE 2 CHRONIC KIDNEY DISEASE, WITHOUT LONG-TERM CURRENT USE OF INSULIN (HCC): Status: ACTIVE | Noted: 2023-09-05

## 2023-09-05 PROBLEM — E11.22 TYPE 2 DIABETES MELLITUS WITH STAGE 2 CHRONIC KIDNEY DISEASE, WITHOUT LONG-TERM CURRENT USE OF INSULIN (HCC): Status: ACTIVE | Noted: 2023-09-05

## 2023-09-05 PROBLEM — R94.31 ABNORMAL EKG: Status: ACTIVE | Noted: 2022-01-08

## 2023-09-05 PROCEDURE — 99214 OFFICE O/P EST MOD 30 MIN: CPT | Performed by: PHYSICIAN ASSISTANT

## 2023-09-05 NOTE — PROGRESS NOTES
Assessment/Plan:         Diagnoses and all orders for this visit:    Type 2 diabetes mellitus without complication, without long-term current use of insulin (HCC)  Comments:  improved, continue with exercise and low carb diet    Orders:  -     Comprehensive metabolic panel; Future  -     HEMOGLOBIN A1C W/ EAG ESTIMATION; Future    Hypertension, essential  Comments:  well controlled  Orders:  -     Comprehensive metabolic panel; Future  -     HEMOGLOBIN A1C W/ EAG ESTIMATION; Future    Mixed hyperlipidemia  Comments:  continue rosuvustatin   Orders:  -     Comprehensive metabolic panel; Future  -     HEMOGLOBIN A1C W/ EAG ESTIMATION; Future    Depression, unspecified depression type  Comments:  follows with psychiatry  pt reports sleeping well   Orders:  -     Comprehensive metabolic panel; Future  -     HEMOGLOBIN A1C W/ EAG ESTIMATION; Future        BMI Counseling: Body mass index is 29.76 kg/m². The BMI is above normal. Nutrition recommendations include encouraging healthy choices of fruits and vegetables and limiting drinks that contain sugar. Exercise recommendations include exercising 3-5 times per week. Rationale for BMI follow-up plan is due to patient being overweight or obese. Subjective:      Patient ID: Kayla Duff is a 75 y.o. female. 76 y/o female with hx of DM, htn, TASHA, hld presents for f/u  Pt reports she has been watching diet and trying to exercising more - lost 17 lbs since March  Going to gym and trying to walk   Pt reports feeling well  Labs reviewed   hga1c 6 - 6.5 - controlled       The following portions of the patient's history were reviewed and updated as appropriate: allergies, current medications, past family history, past medical history, past social history, past surgical history and problem list.    Review of Systems   Constitutional: Negative for activity change, appetite change, chills, diaphoresis, fatigue, fever and unexpected weight change.    HENT: Negative for congestion and sore throat. Eyes: Negative for pain and redness. Respiratory: Negative for cough, shortness of breath and wheezing. Cardiovascular: Negative for chest pain, palpitations and leg swelling. Gastrointestinal: Negative for abdominal pain, constipation, diarrhea and nausea. Genitourinary: Negative for dysuria and frequency. Musculoskeletal: Negative for arthralgias and back pain. Skin: Negative for rash. Neurological: Negative for dizziness, light-headedness and headaches. Psychiatric/Behavioral: Negative for sleep disturbance. The patient is nervous/anxious.           Past Medical History:   Diagnosis Date   • Acquired acanthosis nigricans    • Anxiety    • Depression 10/3/2017   • Essential hypertension    • HLD (hyperlipidemia)    • Hypertension    • Prediabetes    • Type 2 diabetes mellitus with stage 2 chronic kidney disease, without long-term current use of insulin ([address]) 9/5/2023   • Type 2 diabetes mellitus without complication ([address])     last assessed: 10/03/2017   • Type 2 diabetes mellitus without complication, without long-term current use of insulin ([facility]) 3/4/2021         Current Outpatient Medications:   •  amLODIPine (NORVASC) 10 mg tablet, Take 1 tablet (10 mg total) by mouth in the morning., Disp: 90 tablet, Rfl: 1  •  ammonium lactate (LAC-HYDRIN) 12 % lotion, Apply topically daily as needed for dry skin, Disp: 400 g, Rfl: 2  •  ARIPiprazole (ABILIFY) 5 mg tablet, Take 5 mg by mouth daily, Disp: , Rfl:   •  DULoxetine (CYMBALTA) 60 mg delayed release capsule, Take 60 mg by mouth in the morning., Disp: , Rfl:   •  eszopiclone (LUNESTA) tablet, Take 3 mg by mouth daily at bedtime as needed, Disp: , Rfl: 0  •  BtFch-[id number]-WL-B Cmp-C-Biot (INTEGRA PLUS) CAPS, Take 1 tablet by mouth daily, Disp: , Rfl: 0  •  rosuvastatin (CRESTOR) 10 MG tablet, TAKE 1 TABLET BY MOUTH MONDAY, WEDNESDAY, AND FRIDAY, Disp: 30 tablet, Rfl: 5  •  sitaGLIPtin-metFORMIN (Janumet)  MG per tablet, Take 1 tablet by mouth 2 (two) times a day, Disp: 180 tablet, Rfl: 1  •  solifenacin (VESICARE) 5 mg tablet, Take 1 tablet (5 mg total) by mouth daily, Disp: 90 tablet, Rfl: 1  •  tazarotene (AVAGE) 0.1 % cream, Apply topically daily, Disp: , Rfl: 0  •  valsartan (DIOVAN) 160 mg tablet, Take 160 mg by mouth daily, Disp: , Rfl:   •  WELLBUTRIN  MG 24 hr tablet, Take 1 tablet by mouth daily, Disp: , Rfl: 0    Allergies   Allergen Reactions   • Escitalopram Rash   • Sulfa Antibiotics Hives and Rash   • Food Other (See Comments)     Annotation - [id number]: CORN - causes extreme sleepiness       Social History   Past Surgical History:   Procedure Laterality Date   • APPENDECTOMY     • TOTAL ABDOMINAL HYSTERECTOMY      age 27   • [facility] STUDY  2018     Family History   Problem Relation Age of Onset   • Diabetes Mother            • Stroke Mother    • Depression Mother    • No Known Problems Father    • No Known Problems Sister    • No Known Problems Daughter    • No Known Problems Brother    • No Known Problems Brother    • No Known Problems Brother    • No Known Problems Half-Sister    • Schizoaffective Disorder  Cousin         Aunt       Objective:  /80 (BP Location: Left arm, Patient Position: Sitting, Cuff Size: Standard)   Pulse 73   Temp 97.9 °F (36.6 °C) (Temporal)   Ht 5' 7.52" (1.715 m)   Wt 87.5 kg (193 lb)   SpO2 96%   BMI 29.76 kg/m²        Physical Exam  Vitals reviewed. Constitutional:       General: She is not in acute distress. HENT:      Head: Normocephalic and atraumatic. Right Ear: Tympanic membrane, ear canal and external ear normal. There is no impacted cerumen. Left Ear: Tympanic membrane, ear canal and external ear normal. There is no impacted cerumen. Nose: Nose normal.      Mouth/Throat:      Mouth: Mucous membranes are moist.   Eyes:      Extraocular Movements: Extraocular movements intact. Conjunctiva/sclera: Conjunctivae normal.      Pupils: Pupils are equal, round, and reactive to light. Cardiovascular:      Rate and Rhythm: Normal rate and regular rhythm. Pulmonary:      Effort: Pulmonary effort is normal. No respiratory distress. Breath sounds: Normal breath sounds. No wheezing or rales. Abdominal:      General: Bowel sounds are normal. There is no distension. Musculoskeletal:      Cervical back: Normal range of motion. Right lower leg: No edema. Left lower leg: No edema. Skin:     General: Skin is warm. Findings: No erythema or rash. Neurological:      General: No focal deficit present. Mental Status: She is alert and oriented to person, place, and time.    Psychiatric:         Mood and Affect: Mood normal.

## 2023-09-05 NOTE — PROGRESS NOTES
Diabetic Foot Exam    Patient's shoes and socks removed. Right Foot/Ankle   Right Foot Inspection  Skin Exam: skin normal and skin intact. No dry skin, no warmth, no callus, no erythema, no maceration, no abnormal color, no pre-ulcer, no ulcer and no callus. Toe Exam: ROM and strength within normal limits. No swelling, no tenderness and erythema    Sensory   Vibration: intact  Proprioception: intact  Monofilament testing: intact    Vascular  The right DP pulse is 2+. The right PT pulse is 2+. Left Foot/Ankle  Left Foot Inspection  Skin Exam: skin normal and skin intact. No dry skin, no warmth, no erythema, no maceration, normal color, no pre-ulcer, no ulcer and no callus. Toe Exam: ROM and strength within normal limits. No swelling, no tenderness and no erythema. Sensory   Vibration: intact  Proprioception: intact  Monofilament testing: intact    Vascular  The left DP pulse is 2+. The left PT pulse is 2+.      Assign Risk Category  No deformity present  Loss of protective sensation  No weak pulses  Risk: 1

## 2023-10-03 ENCOUNTER — HOSPITAL ENCOUNTER (OUTPATIENT)
Dept: MAMMOGRAPHY | Facility: HOSPITAL | Age: 68
Discharge: HOME | End: 2023-10-03
Payer: COMMERCIAL

## 2023-10-03 ENCOUNTER — CLINICAL SUPPORT (OUTPATIENT)
Dept: INTERNAL MEDICINE CLINIC | Age: 68
End: 2023-10-03
Payer: COMMERCIAL

## 2023-10-03 VITALS — BODY MASS INDEX: 30.29 KG/M2 | HEIGHT: 67 IN | WEIGHT: 193 LBS

## 2023-10-03 DIAGNOSIS — Z12.31 ENCOUNTER FOR SCREENING MAMMOGRAM FOR MALIGNANT NEOPLASM OF BREAST: ICD-10-CM

## 2023-10-03 DIAGNOSIS — Z23 FLU VACCINE NEED: Primary | ICD-10-CM

## 2023-10-03 PROCEDURE — 77067 SCR MAMMO BI INCL CAD: CPT

## 2023-10-03 PROCEDURE — 90662 IIV NO PRSV INCREASED AG IM: CPT

## 2023-10-03 PROCEDURE — 77063 BREAST TOMOSYNTHESIS BI: CPT

## 2023-10-03 PROCEDURE — 90471 IMMUNIZATION ADMIN: CPT

## 2023-10-04 ENCOUNTER — TELEPHONE (OUTPATIENT)
Age: 68
End: 2023-10-04

## 2023-10-04 DIAGNOSIS — M17.11 PRIMARY OSTEOARTHRITIS OF RIGHT KNEE: Primary | ICD-10-CM

## 2023-10-04 NOTE — TELEPHONE ENCOUNTER
Caller: Patient  Doctor/office: Keila  CB#: [phone]       called to start auth/delivery for VISCO/EUFLEXXA/Durolane injections    Body Part: Right knee  Pain Level (scale 1-10): 8-9  Date of last Gel Injection: 4/12/23    Educated patient on Visco procedure.  Medications must first be authorized and delivered to our office BEFORE we can schedule

## 2023-10-25 ENCOUNTER — PROCEDURE VISIT (OUTPATIENT)
Dept: OBGYN CLINIC | Facility: CLINIC | Age: 68
End: 2023-10-25
Payer: COMMERCIAL

## 2023-10-25 DIAGNOSIS — G89.29 CHRONIC PAIN OF RIGHT KNEE: ICD-10-CM

## 2023-10-25 DIAGNOSIS — M25.561 CHRONIC PAIN OF RIGHT KNEE: ICD-10-CM

## 2023-10-25 DIAGNOSIS — M17.11 PRIMARY OSTEOARTHRITIS OF RIGHT KNEE: Primary | ICD-10-CM

## 2023-10-25 PROCEDURE — 20610 DRAIN/INJ JOINT/BURSA W/O US: CPT | Performed by: ORTHOPAEDIC SURGERY

## 2023-10-25 RX ORDER — HYALURONATE SODIUM 10 MG/ML
20 SYRINGE (ML) INTRAARTICULAR
Status: COMPLETED | OUTPATIENT
Start: 2023-10-25 | End: 2023-10-25

## 2023-10-25 RX ADMIN — Medication 20 MG: at 17:30

## 2023-10-25 NOTE — PROGRESS NOTES
Assessment/Plan:  1. Primary osteoarthritis of right knee  Large joint arthrocentesis: R knee      2. Chronic pain of right knee  Large joint arthrocentesis: R knee        Scribe Attestation      I,:  Og Guadalupe am acting as a scribe while in the presence of the attending physician.:       I,:  Patricia Covington DO personally performed the services described in this documentation    as scribed in my presence.:           Oswaldo De Los Santos is a pleasant 50-year-old female who returns today for follow-up evaluation of her right knee pain. She did very well with Euflexxa series which concluded on 4/12/2023. We discussed the risks and benefits of a repeat Euflexxa series today. She was amenable to this. I was able to obtain prior authorization before her visit today to initiate the series. She consented to and underwent the first injection in a Euflexxa series for her right knee as documented below without issue or complication and this was well-tolerated by the patient. Postinjection instructions were provided. We will see her back in 1 week for the second injection in the series. Large joint arthrocentesis: R knee  Universal Protocol:  Consent: Verbal consent obtained.   Risks and benefits: risks, benefits and alternatives were discussed  Consent given by: patient  Patient understanding: patient states understanding of the procedure being performed  Site marked: the operative site was marked  Patient identity confirmed: verbally with patient  Supporting Documentation  Indications: pain   Procedure Details  Location: knee - R knee  Preparation: Patient was prepped and draped in the usual sterile fashion  Needle size: 20 G  Ultrasound guidance: no  Approach: anterolateral  Medications administered: 20 mg Sodium Hyaluronate (Viscosup) 20 MG/2ML  Specialty Pharmacy Supplied: received medications from pharmacy  Patient tolerance: patient tolerated the procedure well with no immediate complications  Dressing:  Sterile dressing applied          Subjective: Follow-up evaluation for right knee pain    Patient ID: Gerard Caraballo is a 70 y.o. female who returns today for follow-up evaluation of her right knee pain. She concluded a viscosupplementation injection series on 4/12/2023. This provided good relief of her pain until recently. She complains of a return of her activity related right knee pain which can reach 8/10 on the pain scale. She would like to repeat her Euflexxa series. She denies any new injury or trauma. Review of Systems   Constitutional:  Positive for activity change. Negative for chills, fever and unexpected weight change. HENT:  Negative for hearing loss, nosebleeds and sore throat. Eyes:  Negative for pain, redness and visual disturbance. Respiratory:  Negative for cough, shortness of breath and wheezing. Cardiovascular:  Negative for chest pain, palpitations and leg swelling. Gastrointestinal:  Negative for abdominal pain, nausea and vomiting. Endocrine: Negative for polydipsia and polyuria. Genitourinary:  Negative for dysuria and hematuria. Musculoskeletal:  Positive for arthralgias and myalgias. Negative for joint swelling. See HPI   Skin:  Negative for rash and wound. Neurological:  Negative for dizziness, numbness and headaches. Psychiatric/Behavioral:  Negative for decreased concentration and suicidal ideas. The patient is not nervous/anxious.           Past Medical History:   Diagnosis Date    Acquired acanthosis nigricans     Anxiety     Depression 10/3/2017    Essential hypertension     HLD (hyperlipidemia)     Hypertension     Prediabetes     Type 2 diabetes mellitus with stage 2 chronic kidney disease, without long-term current use of insulin  9/5/2023    Type 2 diabetes mellitus without complication ([address])     last assessed: 10/03/2017    Type 2 diabetes mellitus without complication, without long-term current use of insulin ([address]) 3/4/2021       Past Surgical History:   Procedure Laterality Date    APPENDECTOMY      TOTAL ABDOMINAL HYSTERECTOMY      age 27    [address] [facility] STUDY  2018       Family History   Problem Relation Age of Onset    Diabetes Mother             Stroke Mother     Depression Mother     No Known Problems Father     No Known Problems Sister     No Known Problems Daughter     No Known Problems Brother     No Known Problems Brother     No Known Problems Brother     No Known Problems Half-Sister     Schizoaffective Disorder  Cousin         Aunt       Social History     Occupational History    Not on file   Tobacco Use    Smoking status: Never    Smokeless tobacco: Never   Vaping Use    Vaping Use: Never used   Substance and Sexual Activity    Alcohol use: No    Drug use: No    Sexual activity: Not Currently     Partners: Male         Current Outpatient Medications:     amLODIPine (NORVASC) 10 mg tablet, Take 1 tablet (10 mg total) by mouth in the morning., Disp: 90 tablet, Rfl: 1    ammonium lactate (LAC-HYDRIN) 12 % lotion, Apply topically daily as needed for dry skin, Disp: 400 g, Rfl: 2    ARIPiprazole (ABILIFY) 5 mg tablet, Take 5 mg by mouth daily, Disp: , Rfl:     DULoxetine (CYMBALTA) 60 mg delayed release capsule, Take 60 mg by mouth in the morning., Disp: , Rfl:     eszopiclone (LUNESTA) tablet, Take 3 mg by mouth daily at bedtime as needed, Disp: , Rfl: 0    [id number]-[id number]-B Cmp-C-Biot (INTEGRA PLUS) CAPS, Take 1 tablet by mouth daily, Disp: , Rfl: 0    rosuvastatin (CRESTOR) 10 MG tablet, TAKE 1 TABLET BY MOUTH MONDAY, WEDNESDAY, AND FRIDAY, Disp: 30 tablet, Rfl: 5    sitaGLIPtin-metFORMIN (Janumet)  MG per tablet, Take 1 tablet by mouth 2 (two) times a day, Disp: 180 tablet, Rfl: 1    solifenacin (VESICARE) 5 mg tablet, Take 1 tablet (5 mg total) by mouth daily, Disp: 90 tablet, Rfl: 1    tazarotene (AVAGE) 0.1 % cream, Apply topically daily, Disp: , Rfl: 0    valsartan (DIOVAN) 160 mg tablet, Take 160 mg by mouth daily, Disp: , Rfl:     WELLBUTRIN  MG 24 hr tablet, Take 1 tablet by mouth daily, Disp: , Rfl: 0    Allergies   Allergen Reactions    Escitalopram Rash    Sulfa Antibiotics Hives and Rash    Food Other (See Comments)     Annotation - [id number]: CORN - causes extreme sleepiness       Objective: There were no vitals filed for this visit. There is no height or weight on file to calculate BMI. Right Knee Exam     Muscle Strength   The patient has normal right knee strength. Tenderness   The patient is experiencing tenderness in the medial joint line and MCL. Range of Motion   Extension:  0 normal   Flexion:  120 normal     Tests   Varus: negative Valgus: negative  Drawer:  Anterior - negative    Posterior - negative  Patellar apprehension: negative    Other   Erythema: absent  Scars: absent  Sensation: normal  Pulse: present  Swelling: none  Effusion: no effusion present    Comments:  Stable at 0, 30, and 90  Thigh and calf soft and nontender  Ambulates with a slightly antalgic gait on the right without assistive device  Grossly distally neurovascular intact          Observations     Right Knee   Negative for effusion. Physical Exam  Vitals and nursing note reviewed. Constitutional:       Appearance: Normal appearance. She is well-developed. HENT:      Head: Normocephalic and atraumatic. Right Ear: External ear normal.      Left Ear: External ear normal.   Eyes:      General: No scleral icterus. Extraocular Movements: Extraocular movements intact. Conjunctiva/sclera: Conjunctivae normal.   Cardiovascular:      Rate and Rhythm: Normal rate. Pulmonary:      Effort: Pulmonary effort is normal. No respiratory distress. Musculoskeletal:      Cervical back: Normal range of motion and neck supple. Right knee: No effusion. Comments: See Ortho exam   Skin:     General: Skin is warm and dry. Neurological:      General: No focal deficit present. Mental Status: She is alert and oriented to person, place, and time. Psychiatric:         Behavior: Behavior normal.         This document was created using speech voice recognition software. Grammatical errors, random word insertions, pronoun errors, and incomplete sentences are an occasional consequence of this system due to software limitations, ambient noise, and hardware issues. Any formal questions or concerns about content, text, or information contained within the body of this dictation should be directly addressed to the provider for clarification.

## 2023-10-31 LAB
LEFT EYE DIABETIC RETINOPATHY: NORMAL
RIGHT EYE DIABETIC RETINOPATHY: NORMAL

## 2023-11-02 ENCOUNTER — PROCEDURE VISIT (OUTPATIENT)
Dept: OBGYN CLINIC | Facility: CLINIC | Age: 68
End: 2023-11-02
Payer: COMMERCIAL

## 2023-11-02 DIAGNOSIS — G89.29 CHRONIC PAIN OF RIGHT KNEE: ICD-10-CM

## 2023-11-02 DIAGNOSIS — M25.561 CHRONIC PAIN OF RIGHT KNEE: ICD-10-CM

## 2023-11-02 DIAGNOSIS — M17.11 PRIMARY OSTEOARTHRITIS OF RIGHT KNEE: Primary | ICD-10-CM

## 2023-11-02 PROCEDURE — 20610 DRAIN/INJ JOINT/BURSA W/O US: CPT

## 2023-11-02 RX ADMIN — Medication 20 MG: at 15:45

## 2023-11-02 NOTE — PROGRESS NOTES
Assessment/Plan:  1. Primary osteoarthritis of right knee  Large joint arthrocentesis: R knee      2. Chronic pain of right knee  Large joint arthrocentesis: R knee        Scribe Attestation      I,:  Damien Shetty PA-C am acting as a scribe while in the presence of the attending physician.:       I,:   March, DO personally performed the services described in this documentation    as scribed in my presence.:           Gopal Vera is a pleasant 41-year-old female who returns today for second of three Euflexxa injections. She reports improved pain overall following her injection last week, but still present along the medial joint line. She consented to and underwent the second injection in a Euflexxa series for her right knee as documented below without issue or complication and this was well-tolerated by the patient. Postinjection instructions were provided. We will see her back in 1 week for the third and final injection in the series. Large joint arthrocentesis: R knee  Universal Protocol:  Consent: Verbal consent obtained. Risks and benefits: risks, benefits and alternatives were discussed  Consent given by: patient  Timeout called at: 11/2/2023 3:47 PM.  Patient understanding: patient states understanding of the procedure being performed  Patient consent: the patient's understanding of the procedure matches consent given  Site marked: the operative site was marked  Radiology Images displayed and confirmed.  If images not available, report reviewed: imaging studies available  Patient identity confirmed: verbally with patient  Supporting Documentation  Indications: pain   Procedure Details  Location: knee - R knee  Needle size: 22 G  Ultrasound guidance: no  Approach: anterolateral  Medications administered: 20 mg Sodium Hyaluronate (Viscosup) 20 MG/2ML    Patient tolerance: patient tolerated the procedure well with no immediate complications  Dressing:  Sterile dressing applied          Subjective: Follow-up evaluation for Euflexxa #2/3 for right primary knee osteoarthritis    Patient ID: Patrick Roach is a 70 y.o. female who returns today for her second Euflexxa injection of three today. She reports 6/10 pain today localized to the medial aspect that is improved since her first Euflexxa injection last week. She denies any new injury or trauma and numbness/tingling. Review of Systems   Constitutional:  Positive for activity change. Negative for chills, fever and unexpected weight change. HENT:  Negative for hearing loss, nosebleeds and sore throat. Eyes:  Negative for pain, redness and visual disturbance. Respiratory:  Negative for cough, shortness of breath and wheezing. Cardiovascular:  Negative for chest pain, palpitations and leg swelling. Gastrointestinal:  Negative for abdominal pain, nausea and vomiting. Endocrine: Negative for polydipsia and polyuria. Genitourinary:  Negative for dysuria and hematuria. Musculoskeletal:  Positive for arthralgias and myalgias. Negative for joint swelling. See HPI   Skin:  Negative for rash and wound. Neurological:  Negative for dizziness, numbness and headaches. Psychiatric/Behavioral:  Negative for decreased concentration and suicidal ideas. The patient is not nervous/anxious.           Past Medical History:   Diagnosis Date    Acquired acanthosis nigricans     Anxiety     Depression 10/3/2017    Essential hypertension     HLD (hyperlipidemia)     Hypertension     Prediabetes     Type 2 diabetes mellitus with stage 2 chronic kidney disease, without long-term current use of insulin  9/5/2023    Type 2 diabetes mellitus without complication ([address] )     last assessed: 10/03/2017    Type 2 diabetes mellitus without complication, without long-term current use of insulin ([address]) 3/4/2021       Past Surgical History:   Procedure Laterality Date    APPENDECTOMY      TOTAL ABDOMINAL HYSTERECTOMY  1989    age 27    [facility] STUDY  2018       Family History   Problem Relation Age of Onset    Diabetes Mother             Stroke Mother     Depression Mother     No Known Problems Father     No Known Problems Sister     No Known Problems Daughter     No Known Problems Brother     No Known Problems Brother     No Known Problems Brother     No Known Problems Half-Sister     Schizoaffective Disorder  Cousin         Aunt       Social History     Occupational History    Not on file   Tobacco Use    Smoking status: Never    Smokeless tobacco: Never   Vaping Use    Vaping Use: Never used   Substance and Sexual Activity    Alcohol use: No    Drug use: No    Sexual activity: Not Currently     Partners: Male         Current Outpatient Medications:     amLODIPine (NORVASC) 10 mg tablet, Take 1 tablet (10 mg total) by mouth in the morning., Disp: 90 tablet, Rfl: 1    ammonium lactate (LAC-HYDRIN) 12 % lotion, Apply topically daily as needed for dry skin, Disp: 400 g, Rfl: 2    ARIPiprazole (ABILIFY) 5 mg tablet, Take 5 mg by mouth daily, Disp: , Rfl:     DULoxetine (CYMBALTA) 60 mg delayed release capsule, Take 60 mg by mouth in the morning., Disp: , Rfl:     eszopiclone (LUNESTA) tablet, Take 3 mg by mouth daily at bedtime as needed, Disp: , Rfl: 0    IsQjk-IcDcnw-RZ-B Cmp-C-Biot (INTEGRA PLUS) CAPS, Take 1 tablet by mouth daily, Disp: , Rfl: 0    rosuvastatin (CRESTOR) 10 MG tablet, TAKE 1 TABLET BY MOUTH MONDAY, WEDNESDAY, AND FRIDAY, Disp: 30 tablet, Rfl: 5    sitaGLIPtin-metFORMIN (Janumet)  MG per tablet, Take 1 tablet by mouth 2 (two) times a day, Disp: 180 tablet, Rfl: 1    solifenacin (VESICARE) 5 mg tablet, Take 1 tablet (5 mg total) by mouth daily, Disp: 90 tablet, Rfl: 1    tazarotene (AVAGE) 0.1 % cream, Apply topically daily, Disp: , Rfl: 0    valsartan (DIOVAN) 160 mg tablet, Take 160 mg by mouth daily, Disp: , Rfl:     WELLBUTRIN  MG 24 hr tablet, Take 1 tablet by mouth daily, Disp: , Rfl: 0    Allergies Allergen Reactions    Escitalopram Rash    Sulfa Antibiotics Hives and Rash    Food Other (See Comments)     Annotation - [id number]: CORN - causes extreme sleepiness       Objective: There were no vitals filed for this visit. There is no height or weight on file to calculate BMI. Right Knee Exam     Muscle Strength   The patient has normal right knee strength. Tenderness   The patient is experiencing tenderness in the medial joint line and MCL. Range of Motion   Extension:  0 normal   Flexion:  120 normal     Tests   Varus: negative Valgus: negative  Drawer:  Anterior - negative    Posterior - negative  Patellar apprehension: negative    Other   Erythema: absent  Scars: absent  Sensation: normal  Pulse: present  Swelling: none  Effusion: no effusion present    Comments:  Stable at 0, 30, and 90  Thigh and calf soft and nontender  Ambulates with a slightly antalgic gait on the right without assistive device  Grossly distally neurovascular intact          Observations     Right Knee   Negative for effusion. Physical Exam  Vitals and nursing note reviewed. Constitutional:       Appearance: Normal appearance. She is well-developed. HENT:      Head: Normocephalic and atraumatic. Right Ear: External ear normal.      Left Ear: External ear normal.   Eyes:      General: No scleral icterus. Extraocular Movements: Extraocular movements intact. Conjunctiva/sclera: Conjunctivae normal.   Cardiovascular:      Rate and Rhythm: Normal rate. Pulmonary:      Effort: Pulmonary effort is normal. No respiratory distress. Musculoskeletal:      Cervical back: Normal range of motion and neck supple. Right knee: No effusion. Comments: See Ortho exam   Skin:     General: Skin is warm and dry. Neurological:      General: No focal deficit present. Mental Status: She is alert and oriented to person, place, and time.    Psychiatric:         Behavior: Behavior normal.         This document was created using speech voice recognition software. Grammatical errors, random word insertions, pronoun errors, and incomplete sentences are an occasional consequence of this system due to software limitations, ambient noise, and hardware issues. Any formal questions or concerns about content, text, or information contained within the body of this dictation should be directly addressed to the provider for clarification.

## 2023-11-02 NOTE — LETTER
November 2, 2023     Patient: Shannon Mantilla  YOB: 1955  Date of Visit: 11/2/2023      To Whom it May Concern:    Marcellus Reece is under my professional care. Marie Mahajan was seen in my office on 11/2/2023. Please excuse Marie Mahajan for her absence for her absence this afternoon. If you have any questions or concerns, please don't hesitate to call.          Sincerely,          Blayne Fisher DO        CC: No Recipients

## 2023-11-03 RX ORDER — HYALURONATE SODIUM 10 MG/ML
20 SYRINGE (ML) INTRAARTICULAR
Status: COMPLETED | OUTPATIENT
Start: 2023-11-02 | End: 2023-11-02

## 2023-11-09 ENCOUNTER — PROCEDURE VISIT (OUTPATIENT)
Dept: OBGYN CLINIC | Facility: CLINIC | Age: 68
End: 2023-11-09
Payer: COMMERCIAL

## 2023-11-09 DIAGNOSIS — M17.11 PRIMARY OSTEOARTHRITIS OF RIGHT KNEE: Primary | ICD-10-CM

## 2023-11-09 DIAGNOSIS — M25.561 CHRONIC PAIN OF RIGHT KNEE: ICD-10-CM

## 2023-11-09 DIAGNOSIS — G89.29 CHRONIC PAIN OF RIGHT KNEE: ICD-10-CM

## 2023-11-09 PROCEDURE — 20610 DRAIN/INJ JOINT/BURSA W/O US: CPT | Performed by: ORTHOPAEDIC SURGERY

## 2023-11-09 RX ADMIN — Medication 20 MG: at 15:30

## 2023-11-09 NOTE — PROGRESS NOTES
Assessment/Plan:  1. Primary osteoarthritis of right knee  Large joint arthrocentesis: R knee      2. Chronic pain of right knee  Large joint arthrocentesis: R knee        Scribe Attestation      I,:  Favian Mayen PA-C am acting as a scribe while in the presence of the attending physician.:       I,:  Conchis Reeves DO personally performed the services described in this documentation    as scribed in my presence.:           Calvin Lui is a pleasant 79-year-old presenting today for the third and final Euflexxa injection for her right knee osteoarthritis and active related pain. She consented to and underwent the injection as detailed below, which she tolerated well without difficulty or complication. Postinjection instructions were provided. We will see her back in 6 months pending efficacy of the series. Large joint arthrocentesis: R knee  Universal Protocol:  Consent: Verbal consent obtained. Risks and benefits: risks, benefits and alternatives were discussed  Consent given by: patient  Time out: Immediately prior to procedure a "time out" was called to verify the correct patient, procedure, equipment, support staff and site/side marked as required.   Timeout called at: 11/9/2023 3:19 PM.  Site marked: the operative site was marked  Patient identity confirmed: verbally with patient  Supporting Documentation  Indications: pain   Procedure Details  Location: knee - R knee  Preparation: Patient was prepped and draped in the usual sterile fashion  Needle size: 20 G  Ultrasound guidance: no  Approach: anterolateral  Medications administered: 20 mg Sodium Hyaluronate (Viscosup) 20 MG/2ML  Specialty Pharmacy Supplied: received medications from pharmacy  Patient tolerance: patient tolerated the procedure well with no immediate complications  Dressing:  Sterile dressing applied            Subjective: Right knee Euflexxa #3    Patient ID: Lasha Guzman is a 70 y.o. female presenting today for the final right knee viscosupplementation injection    Review of Systems      Past Medical History:   Diagnosis Date    Acquired acanthosis nigricans     Anxiety     Depression 10/3/2017    Essential hypertension     HLD (hyperlipidemia)     Hypertension     Prediabetes     Type 2 diabetes mellitus with stage 2 chronic kidney disease, without long-term current use of insulin  2023    Type 2 diabetes mellitus without complication ([address])     last assessed: 10/03/2017    Type 2 diabetes mellitus without complication, without long-term current use of insulin ([address]) 3/4/2021       Past Surgical History:   Procedure Laterality Date    APPENDECTOMY      TOTAL ABDOMINAL HYSTERECTOMY      age 27    [facility] STUDY  2018       Family History   Problem Relation Age of Onset    Diabetes Mother             Stroke Mother     Depression Mother     No Known Problems Father     No Known Problems Sister     No Known Problems Daughter     No Known Problems Brother     No Known Problems Brother     No Known Problems Brother     No Known Problems Half-Sister     Schizoaffective Disorder  Cousin         Aunt       Social History     Occupational History    Not on file   Tobacco Use    Smoking status: Never    Smokeless tobacco: Never   Vaping Use    Vaping Use: Never used   Substance and Sexual Activity    Alcohol use: No    Drug use: No    Sexual activity: Not Currently     Partners: Male         Current Outpatient Medications:     amLODIPine (NORVASC) 10 mg tablet, Take 1 tablet (10 mg total) by mouth in the morning., Disp: 90 tablet, Rfl: 1    ammonium lactate (LAC-HYDRIN) 12 % lotion, Apply topically daily as needed for dry skin, Disp: 400 g, Rfl: 2    ARIPiprazole (ABILIFY) 5 mg tablet, Take 5 mg by mouth daily, Disp: , Rfl:     DULoxetine (CYMBALTA) 60 mg delayed release capsule, Take 60 mg by mouth in the morning., Disp: , Rfl:     eszopiclone (LUNESTA) tablet, Take 3 mg by mouth daily at bedtime as needed, Disp: , Rfl: 0    DdQjb-EnJlva-WE-B Cmp-C-Biot (INTEGRA PLUS) CAPS, Take 1 tablet by mouth daily, Disp: , Rfl: 0    rosuvastatin (CRESTOR) 10 MG tablet, TAKE 1 TABLET BY MOUTH MONDAY, WEDNESDAY, AND FRIDAY, Disp: 30 tablet, Rfl: 5    sitaGLIPtin-metFORMIN (Janumet)  MG per tablet, Take 1 tablet by mouth 2 (two) times a day, Disp: 180 tablet, Rfl: 1    solifenacin (VESICARE) 5 mg tablet, Take 1 tablet (5 mg total) by mouth daily, Disp: 90 tablet, Rfl: 1    tazarotene (AVAGE) 0.1 % cream, Apply topically daily, Disp: , Rfl: 0    valsartan (DIOVAN) 160 mg tablet, Take 160 mg by mouth daily, Disp: , Rfl:     WELLBUTRIN  MG 24 hr tablet, Take 1 tablet by mouth daily, Disp: , Rfl: 0    Allergies   Allergen Reactions    Escitalopram Rash    Sulfa Antibiotics Hives and Rash    Food Other (See Comments)     Annotation - [id number]: CORN - causes extreme sleepiness       Objective: There were no vitals filed for this visit. There is no height or weight on file to calculate BMI. Ortho Exam    Physical Exam  Vitals and nursing note reviewed. This document was created using speech voice recognition software. Grammatical errors, random word insertions, pronoun errors, and incomplete sentences are an occasional consequence of this system due to software limitations, ambient noise, and hardware issues. Any formal questions or concerns about content, text, or information contained within the body of this dictation should be directly addressed to the provider for clarification.

## 2023-11-10 RX ORDER — HYALURONATE SODIUM 10 MG/ML
20 SYRINGE (ML) INTRAARTICULAR
Status: COMPLETED | OUTPATIENT
Start: 2023-11-09 | End: 2023-11-09

## 2023-12-10 DIAGNOSIS — N32.81 OAB (OVERACTIVE BLADDER): ICD-10-CM

## 2023-12-11 RX ORDER — SOLIFENACIN SUCCINATE 5 MG/1
5 TABLET, FILM COATED ORAL DAILY
Qty: 90 TABLET | Refills: 0 | Status: SHIPPED | OUTPATIENT
Start: 2023-12-11

## 2024-01-10 ENCOUNTER — OFFICE VISIT (OUTPATIENT)
Dept: INTERNAL MEDICINE CLINIC | Age: 69
End: 2024-01-10
Payer: COMMERCIAL

## 2024-01-10 VITALS
DIASTOLIC BLOOD PRESSURE: 82 MMHG | SYSTOLIC BLOOD PRESSURE: 118 MMHG | WEIGHT: 219 LBS | HEIGHT: 68 IN | TEMPERATURE: 98.6 F | HEART RATE: 87 BPM | OXYGEN SATURATION: 95 % | BODY MASS INDEX: 33.19 KG/M2

## 2024-01-10 DIAGNOSIS — N39.41 URGE INCONTINENCE: ICD-10-CM

## 2024-01-10 DIAGNOSIS — I10 HYPERTENSION, ESSENTIAL: ICD-10-CM

## 2024-01-10 DIAGNOSIS — R01.1 HEART MURMUR: ICD-10-CM

## 2024-01-10 DIAGNOSIS — E11.9 TYPE 2 DIABETES MELLITUS WITHOUT COMPLICATION, WITHOUT LONG-TERM CURRENT USE OF INSULIN (HCC): Primary | ICD-10-CM

## 2024-01-10 DIAGNOSIS — L85.3 DRY SKIN DERMATITIS: ICD-10-CM

## 2024-01-10 DIAGNOSIS — R32 URINARY INCONTINENCE, UNSPECIFIED TYPE: ICD-10-CM

## 2024-01-10 DIAGNOSIS — E78.2 MIXED HYPERLIPIDEMIA: ICD-10-CM

## 2024-01-10 DIAGNOSIS — F32.A DEPRESSION, UNSPECIFIED DEPRESSION TYPE: ICD-10-CM

## 2024-01-10 PROCEDURE — 99214 OFFICE O/P EST MOD 30 MIN: CPT | Performed by: INTERNAL MEDICINE

## 2024-01-10 RX ORDER — AMMONIUM LACTATE 12 G/100G
LOTION TOPICAL DAILY PRN
Qty: 400 G | Refills: 2 | Status: SHIPPED | OUTPATIENT
Start: 2024-01-10

## 2024-01-10 RX ORDER — SOLIFENACIN SUCCINATE 10 MG/1
10 TABLET, FILM COATED ORAL DAILY
Qty: 30 TABLET | Refills: 5 | Status: SHIPPED | OUTPATIENT
Start: 2024-01-10

## 2024-01-10 NOTE — PROGRESS NOTES
Name: Jennifer Weems      : 1955      MRN: [id number]  Encounter Provider: Hector Sanders MD  Encounter Date: 1/10/2024   Encounter department: [facility] [place]    Assessment & Plan     1. Type 2 diabetes mellitus without complication, without long-term current use of insulin (HCC)  -     Microalbumin,Urine    2. Hypertension, essential    3. Dry skin dermatitis  -     ammonium lactate (LAC-HYDRIN) 12 % lotion; Apply topically daily as needed for dry skin    4. Depression, unspecified depression type    5. Mixed hyperlipidemia    6. Type 2 diabetes mellitus with stage 2 chronic kidney disease, without long-term current use of insulin            Subjective      This is a case 1 has diabetes type 2  She has a history of mixed hyperlipidemia on pravastatin history of depression for which she is followed by psychiatrist.  Review of Systems   Constitutional: Negative.    HENT: Negative.     Eyes: Negative.    Respiratory:  Negative for cough, chest tightness, shortness of breath, wheezing and stridor.    Cardiovascular:  Positive for leg swelling (sweling of left calf, no pain). Negative for chest pain and palpitations.   Gastrointestinal: Negative.    Genitourinary:  Positive for frequency and urgency. Negative for difficulty urinating, flank pain, hematuria and pelvic pain.        On vesicare 5 mg wishes to go to 10. Has no cholinergic symptoms   Musculoskeletal:  Positive for arthralgias.        Knee -right is painful gets injections   Skin:         Very small patch of vitaligo on face   Neurological:  Negative for dizziness, light-headedness, numbness and headaches.   Psychiatric/Behavioral:  The patient is nervous/anxious.      Current Outpatient Medications on File Prior to Visit   Medication Sig   • amLODIPine (NORVASC) 10 mg tablet Take 1 tablet (10 mg total) by mouth in the morning.   • ARIPiprazole (ABILIFY) 5 mg tablet Take 5 mg by mouth daily   • DULoxetine (CYMBALTA)  "60 mg delayed release capsule Take 60 mg by mouth in the morning.   • eszopiclone (LUNESTA) tablet Take 3 mg by mouth daily at bedtime as needed   • FoDwk-DdLqvb-TC-B Cmp-C-Biot (INTEGRA PLUS) CAPS Take 1 tablet by mouth daily   • rosuvastatin (CRESTOR) 10 MG tablet TAKE 1 TABLET BY MOUTH MONDAY, WEDNESDAY, AND FRIDAY   • sitaGLIPtin-metFORMIN (Janumet)  MG per tablet Take 1 tablet by mouth 2 (two) times a day   • solifenacin (VESICARE) 5 mg tablet Take 1 tablet (5 mg total) by mouth daily   • valsartan (DIOVAN) 160 mg tablet Take 160 mg by mouth daily   • WELLBUTRIN  MG 24 hr tablet Take 1 tablet by mouth daily   • [DISCONTINUED] ammonium lactate (LAC-HYDRIN) 12 % lotion Apply topically daily as needed for dry skin   • tazarotene (AVAGE) 0.1 % cream Apply topically daily       Objective     /82 (BP Location: Left arm, Patient Position: Sitting, Cuff Size: Large)   Pulse 87   Temp 98.6 °F (37 °C) (Temporal)   Ht 5' 7.72\" (1.72 m)   Wt 99.3 kg (219 lb)   SpO2 95%   BMI 33.58 kg/m²     Physical Exam  Constitutional:       Appearance: Normal appearance. She is obese.   HENT:      Head: Normocephalic and atraumatic.      Right Ear: External ear normal. There is no impacted cerumen.      Left Ear: External ear normal. There is no impacted cerumen.      Nose: Nose normal. No congestion.   Eyes:      Extraocular Movements: Extraocular movements intact.      Pupils: Pupils are equal, round, and reactive to light.   Cardiovascular:      Rate and Rhythm: Regular rhythm.      Heart sounds: Murmur heard.   Pulmonary:      Effort: Pulmonary effort is normal. No respiratory distress.      Breath sounds: Normal breath sounds.   Abdominal:      General: Abdomen is flat.      Palpations: Abdomen is soft. There is no mass.   Musculoskeletal:         General: No swelling, tenderness, deformity or signs of injury.      Cervical back: No rigidity or tenderness.      Comments: Homans negative   Skin:     " Coloration: Skin is not jaundiced or pale.   Neurological:      General: No focal deficit present.      Mental Status: She is alert.      Cranial Nerves: No cranial nerve deficit.   Psychiatric:         Mood and Affect: Mood normal.         Thought Content: Thought content normal.         Judgment: Judgment normal.   Hector Sanders MD

## 2024-01-11 LAB — MICROALBUMIN UR-MCNC: 0.4 MG/DL

## 2024-01-22 DIAGNOSIS — I10 HYPERTENSION, ESSENTIAL: Primary | ICD-10-CM

## 2024-01-22 RX ORDER — VALSARTAN 160 MG/1
160 TABLET ORAL DAILY
Qty: 90 TABLET | Refills: 1 | Status: SHIPPED | OUTPATIENT
Start: 2024-01-22

## 2024-01-22 RX ORDER — VALSARTAN 160 MG/1
160 TABLET ORAL DAILY
Refills: 0 | OUTPATIENT
Start: 2024-01-22

## 2024-02-07 DIAGNOSIS — R73.01 IMPAIRED FASTING GLUCOSE: ICD-10-CM

## 2024-02-07 RX ORDER — SITAGLIPTIN AND METFORMIN HYDROCHLORIDE 1000; 50 MG/1; MG/1
1 TABLET, FILM COATED ORAL 2 TIMES DAILY
Qty: 180 TABLET | Refills: 1 | Status: SHIPPED | OUTPATIENT
Start: 2024-02-07

## 2024-02-14 ENCOUNTER — TELEPHONE (OUTPATIENT)
Dept: ADMINISTRATIVE | Facility: OTHER | Age: 69
End: 2024-02-14

## 2024-02-14 NOTE — LETTER
Diabetic Eye Exam Form    Date Requested: 24  Patient: Jennifer Weems  Patient : 1955   Referring Provider: Hector Sanders MD      DIABETIC Eye Exam Date _______________________________      Type of Exam MUST be documented for Diabetic Eye Exams. Please CHECK ONE.     Retinal Exam       Dilated Retinal Exam       OCT       Optomap-Iris Exam      Fundus Photography       Left Eye - Please check Retinopathy or No Retinopathy        Exam did show retinopathy    Exam did not show retinopathy       Right Eye - Please check Retinopathy or No Retinopathy       Exam did show retinopathy    Exam did not show retinopathy       Comments __________________________________________________________    Practice Providing Exam ______________________________________________    Exam Performed By (print name) _______________________________________      Provider Signature ___________________________________________________      These reports are needed for  compliance.  Please fax this completed form and a copy of the Diabetic Eye Exam report to our office located at [address] [place] [address] as soon as possible via Fax [phone] attention Kerissa: Phone [phone]  We thank you for your assistance in treating our mutual patient.

## 2024-02-14 NOTE — TELEPHONE ENCOUNTER
----- Message from Elizabeth Garsia sent at 2/14/2024  9:10 AM EST -----  Regarding: Care Gap Request  02/14/24 9:10 AM    Hello, our patient attached above has had Diabetic Eye Exam completed/performed. Please assist in updating the patient chart by making an External outreach to [facility] located in [address] [place] [address]. The date of service is SEPT 2023.    Thank you,  Elizabeth Garsia [facility] [place]

## 2024-02-14 NOTE — TELEPHONE ENCOUNTER
Upon review of the In Basket request and the patient's chart, initial outreach has been made via fax to facility. Please see Contacts section for details.     Thank you  MARIZA GUERIN

## 2024-02-14 NOTE — LETTER
Diabetic Eye Exam Form    Date Requested: 24  Patient: Jennifer Weems  Patient : 1955   Referring Provider: Hector Sanders MD      DIABETIC Eye Exam Date _______________________________      Type of Exam MUST be documented for Diabetic Eye Exams. Please CHECK ONE.     Retinal Exam       Dilated Retinal Exam       OCT       Optomap-Iris Exam      Fundus Photography       Left Eye - Please check Retinopathy or No Retinopathy        Exam did show retinopathy    Exam did not show retinopathy       Right Eye - Please check Retinopathy or No Retinopathy       Exam did show retinopathy    Exam did not show retinopathy       Comments __________________________________________________________    Practice Providing Exam ______________________________________________    Exam Performed By (print name) _______________________________________      Provider Signature ___________________________________________________      These reports are needed for  compliance.  Please fax this completed form and a copy of the Diabetic Eye Exam report to our office located at [address] [place] [address] as soon as possible via Fax [phone] attention Kerissa: Phone [phone]  We thank you for your assistance in treating our mutual patient.

## 2024-02-19 NOTE — TELEPHONE ENCOUNTER
As a follow-up, a second attempt has been made for outreach via fax to facility. Please see Contacts section for details.    Dr Watson [facility]   [phone]     Thank you  MARIZA GUERIN

## 2024-02-21 NOTE — TELEPHONE ENCOUNTER
As a final attempt, a third outreach has been made via telephone call to facility. Please see Contacts section for details. This encounter will be closed and completed by end of day. Should we receive the requested information because of previous outreach attempts, the requested patient's chart will be updated appropriately.     Thank you  MARIZA GUERIN

## 2024-02-27 ENCOUNTER — TELEPHONE (OUTPATIENT)
Dept: ADMINISTRATIVE | Facility: OTHER | Age: 69
End: 2024-02-27

## 2024-02-27 NOTE — TELEPHONE ENCOUNTER
----- Message from Elizabeth Garsia sent at 2/27/2024  9:00 AM EST -----  Regarding: Care Gap Request  02/27/24 9:00 AM    Hello, our patient attached above has had Diabetic Eye Exam completed/performed. Please assist in updating the patient chart by pulling the document from the Media Tab. The date of service is 9/12/22.     Thank you,  Elizabeth Garsia PG [facility] [place]

## 2024-02-27 NOTE — TELEPHONE ENCOUNTER
Upon review of the In Basket request we were able to locate, review, and update the patient chart as requested for Diabetic Eye Exam.    Any additional questions or concerns should be emailed to the Practice Liaisons via the appropriate education email address, please do not reply via In Basket.    Thank you  Norma Linares MA

## 2024-04-10 ENCOUNTER — RA CDI HCC (OUTPATIENT)
Dept: OTHER | Facility: HOSPITAL | Age: 69
End: 2024-04-10

## 2024-04-10 NOTE — PROGRESS NOTES
HCC coding opportunities          Chart Reviewed number of suggestions sent to Provider: 1     Patients Insurance        Commercial Insurance: [place] [employer] Commercial [employer]     [id number]

## 2024-05-06 DIAGNOSIS — L85.3 DRY SKIN DERMATITIS: ICD-10-CM

## 2024-05-06 RX ORDER — AMMONIUM LACTATE 12 G/100G
LOTION TOPICAL
Qty: 400 G | Refills: 2 | Status: SHIPPED | OUTPATIENT
Start: 2024-05-06

## 2024-05-14 PROBLEM — R94.31 ABNORMAL EKG: Status: RESOLVED | Noted: 2022-01-08 | Resolved: 2024-05-14

## 2024-05-15 ENCOUNTER — OFFICE VISIT (OUTPATIENT)
Dept: INTERNAL MEDICINE CLINIC | Age: 69
End: 2024-05-15
Payer: COMMERCIAL

## 2024-05-15 VITALS
HEIGHT: 68 IN | HEART RATE: 90 BPM | DIASTOLIC BLOOD PRESSURE: 76 MMHG | WEIGHT: 217 LBS | OXYGEN SATURATION: 96 % | BODY MASS INDEX: 32.89 KG/M2 | SYSTOLIC BLOOD PRESSURE: 128 MMHG | TEMPERATURE: 98 F

## 2024-05-15 DIAGNOSIS — I10 ESSENTIAL HYPERTENSION: ICD-10-CM

## 2024-05-15 DIAGNOSIS — I83.813 VARICOSE VEINS OF BILATERAL LOWER EXTREMITIES WITH PAIN: Primary | ICD-10-CM

## 2024-05-15 DIAGNOSIS — E11.9 TYPE 2 DIABETES MELLITUS WITHOUT COMPLICATION, WITHOUT LONG-TERM CURRENT USE OF INSULIN (HCC): ICD-10-CM

## 2024-05-15 DIAGNOSIS — Z12.31 ENCOUNTER FOR SCREENING MAMMOGRAM FOR MALIGNANT NEOPLASM OF BREAST: ICD-10-CM

## 2024-05-15 DIAGNOSIS — E66.09 CLASS 1 OBESITY DUE TO EXCESS CALORIES WITHOUT SERIOUS COMORBIDITY WITH BODY MASS INDEX (BMI) OF 33.0 TO 33.9 IN ADULT: ICD-10-CM

## 2024-05-15 DIAGNOSIS — E78.2 MIXED HYPERLIPIDEMIA: ICD-10-CM

## 2024-05-15 DIAGNOSIS — F32.A DEPRESSION, UNSPECIFIED DEPRESSION TYPE: ICD-10-CM

## 2024-05-15 DIAGNOSIS — I10 HYPERTENSION, ESSENTIAL: ICD-10-CM

## 2024-05-15 DIAGNOSIS — L80 VITILIGO: ICD-10-CM

## 2024-05-15 PROCEDURE — 99214 OFFICE O/P EST MOD 30 MIN: CPT | Performed by: INTERNAL MEDICINE

## 2024-05-15 RX ORDER — AMLODIPINE BESYLATE 10 MG/1
10 TABLET ORAL DAILY
Qty: 90 TABLET | Refills: 1 | Status: SHIPPED | OUTPATIENT
Start: 2024-05-15

## 2024-05-15 NOTE — PROGRESS NOTES
Ambulatory Visit  Name: Jennifer Weems      : 1955      MRN: [id number]  Encounter Provider: Hector Sanders MD  Encounter Date: 5/15/2024   Encounter department: [facility]    Assessment & Plan   1. Varicose veins of bilateral lower extremities with pain  2. Hypertension, essential  3. Type 2 diabetes mellitus without complication, without long-term current use of insulin (HCC)  Comments:  2 of her metors stollen - gave a sample  Labs reveiwed A1cis 6.9  under stress  4. Depression, unspecified depression type  Comments:  stable now on abelity fro her psychiatrist.  5. Mixed hyperlipidemia  Comments:  HDL 60  trig=84  LDL71   cholesterol 148  on crestor  6. Encounter for screening mammogram for malignant neoplasm of breast  Comments:  Due in October - scheduled  Orders:  -     Mammo screening bilateral w 3d & cad; Future; Expected date: 10/04/2024  7. Essential hypertension  Comments:  well controlled  in the 's  Orders:  -     amLODIPine (NORVASC) 10 mg tablet; Take 1 tablet (10 mg total) by mouth daily  8. Vitiligo  Comments:  not spreading on meds from dermatology  9. Class 1 obesity due to excess calories without serious comorbidity with body mass index (BMI) of 33.0 to 33.9 in adult  Comments:  needs to exercise more  discussed ozemtic       History of Present Illness     Doing well Blood sugar stable HgbA1c is 6.9  Weight stable  Vitiligo improved  For echo      Review of Systems   Constitutional: Negative.    HENT: Negative.  Negative for postnasal drip.    Eyes: Negative.    Respiratory: Negative.  Negative for cough, choking, chest tightness, shortness of breath, wheezing and stridor.    Cardiovascular:  Negative for chest pain, palpitations and leg swelling.   Endocrine: Negative for cold intolerance and heat intolerance.   Genitourinary:  Negative for difficulty urinating, dysuria, enuresis, flank pain, frequency and hematuria.   Neurological:  Negative  "for dizziness, light-headedness, numbness and headaches.   Hematological:  Does not bruise/bleed easily.   Psychiatric/Behavioral:  Positive for dysphoric mood.      Objective     /76 (BP Location: Left arm, Patient Position: Sitting)   Pulse 90   Temp 98 °F (36.7 °C) (Temporal)   Ht 5' 7.72\" (1.72 m)   Wt 98.4 kg (217 lb)   SpO2 96%   BMI 33.27 kg/m²     Physical Exam  Constitutional:       Appearance: Normal appearance. She is obese.   HENT:      Right Ear: There is no impacted cerumen.      Left Ear: External ear normal. There is no impacted cerumen.      Nose: No congestion or rhinorrhea.      Mouth/Throat:      Mouth: Mucous membranes are dry.   Cardiovascular:      Rate and Rhythm: Normal rate.      Heart sounds: Murmur (scheduled for echo) heard.      No friction rub.   Pulmonary:      Effort: No respiratory distress.      Breath sounds: No stridor. No wheezing or rhonchi.   Musculoskeletal:         General: No swelling, tenderness, deformity or signs of injury.      Cervical back: No rigidity.   Skin:     Coloration: Skin is not jaundiced.      Findings: No bruising.   Neurological:      General: No focal deficit present.   Administrative Statements     "

## 2024-06-12 ENCOUNTER — TELEPHONE (OUTPATIENT)
Dept: OBGYN CLINIC | Facility: HOSPITAL | Age: 69
End: 2024-06-12

## 2024-06-12 DIAGNOSIS — M17.11 PRIMARY OSTEOARTHRITIS OF RIGHT KNEE: Primary | ICD-10-CM

## 2024-06-12 NOTE — TELEPHONE ENCOUNTER
Caller: Patient  Doctor/office: Krissy WEEKS#: [phone]    Patient would like another series of visco    Body Part: Right knee  Pain Level (scale 1-10): 8/10   Date of last Gel Injection: 11/9/23    Educated patient on Visco procedure. Auth team will review insurance and process the request appropriately. Patient will be contacted if the have any questions and when ready to schedule.

## 2024-06-21 ENCOUNTER — TELEPHONE (OUTPATIENT)
Age: 69
End: 2024-06-21

## 2024-06-21 NOTE — TELEPHONE ENCOUNTER
Caller: patient    Doctor: Krissy    Reason for call: pt called for update on auth for visco    Call back#: [phone]

## 2024-06-28 DIAGNOSIS — N39.41 URGE INCONTINENCE: ICD-10-CM

## 2024-06-28 DIAGNOSIS — R32 URINARY INCONTINENCE, UNSPECIFIED TYPE: ICD-10-CM

## 2024-06-28 DIAGNOSIS — E11.9 TYPE 2 DIABETES MELLITUS WITHOUT COMPLICATION, WITHOUT LONG-TERM CURRENT USE OF INSULIN (HCC): ICD-10-CM

## 2024-06-28 DIAGNOSIS — I10 HYPERTENSION, ESSENTIAL: ICD-10-CM

## 2024-06-29 RX ORDER — SOLIFENACIN SUCCINATE 10 MG/1
10 TABLET, FILM COATED ORAL DAILY
Qty: 90 TABLET | Refills: 1 | Status: SHIPPED | OUTPATIENT
Start: 2024-06-29

## 2024-06-29 RX ORDER — VALSARTAN 160 MG/1
160 TABLET ORAL DAILY
Qty: 90 TABLET | Refills: 1 | Status: SHIPPED | OUTPATIENT
Start: 2024-06-29

## 2024-07-17 ENCOUNTER — TELEPHONE (OUTPATIENT)
Age: 69
End: 2024-07-17

## 2024-07-17 NOTE — TELEPHONE ENCOUNTER
Caller: Patient    Doctor: Krissy    Reason for call: Patient has to schedule Visco inj's. Is asking for injections on 9/4,11 and 18. The patient can come in at 3:30 on 9/4 and 18. Is asking if she may have an injection at 5 on 9/11 due to her work schedule? Please contact the patient to advise    Call back#: [phone]

## 2024-08-04 ENCOUNTER — TELEPHONE (OUTPATIENT)
Age: 69
End: 2024-08-04

## 2024-08-04 DIAGNOSIS — R73.01 IMPAIRED FASTING GLUCOSE: ICD-10-CM

## 2024-08-04 RX ORDER — SITAGLIPTIN AND METFORMIN HYDROCHLORIDE 1000; 50 MG/1; MG/1
1 TABLET, FILM COATED ORAL 2 TIMES DAILY
Qty: 180 TABLET | Refills: 1 | Status: SHIPPED | OUTPATIENT
Start: 2024-08-04

## 2024-08-04 NOTE — TELEPHONE ENCOUNTER
PA for Janumet  MG  SUBMITTED     via    []CMM-KEY    [x][employer]-Case ID #   [id number]   []Faxed to plan   []Other website    []Phone call Case ID #      Office notes sent, clinical questions answered. Awaiting determination    Turnaround time for your insurance to make a decision on your Prior Authorization can take 7-21 business days.

## 2024-08-05 NOTE — TELEPHONE ENCOUNTER
Unable to leave VM as VM box was full    PA for Janumet  MG Approved     Date(s) approved August 4, 2024 to August 4, 2025    Case # [id number]     Patient advised by          [] MyChart Message  [] Phone call   []LMOM  []L/M to call office as no active Communication consent on file  []Unable to leave detailed message as VM not approved on Communication consent       Pharmacy advised by    [x]Fax  []Phone call    Approval letter scanned into Media Yes

## 2024-08-29 DIAGNOSIS — L85.3 DRY SKIN DERMATITIS: ICD-10-CM

## 2024-08-29 RX ORDER — AMMONIUM LACTATE 12 G/100G
LOTION TOPICAL
Qty: 400 G | Refills: 2 | Status: SHIPPED | OUTPATIENT
Start: 2024-08-29

## 2024-09-04 ENCOUNTER — PROCEDURE VISIT (OUTPATIENT)
Dept: OBGYN CLINIC | Facility: CLINIC | Age: 69
End: 2024-09-04
Payer: COMMERCIAL

## 2024-09-04 DIAGNOSIS — M17.11 PRIMARY OSTEOARTHRITIS OF RIGHT KNEE: Primary | ICD-10-CM

## 2024-09-04 DIAGNOSIS — M25.561 CHRONIC PAIN OF RIGHT KNEE: ICD-10-CM

## 2024-09-04 DIAGNOSIS — G89.29 CHRONIC PAIN OF RIGHT KNEE: ICD-10-CM

## 2024-09-04 PROCEDURE — 20610 DRAIN/INJ JOINT/BURSA W/O US: CPT | Performed by: ORTHOPAEDIC SURGERY

## 2024-09-04 RX ORDER — HYALURONATE SODIUM 10 MG/ML
20 SYRINGE (ML) INTRAARTICULAR
Status: COMPLETED | OUTPATIENT
Start: 2024-09-04 | End: 2024-09-04

## 2024-09-04 RX ORDER — DEXTROMETHORPHAN HYDROBROMIDE, BUPROPION HYDROCHLORIDE 105; 45 MG/1; MG/1
TABLET, MULTILAYER, EXTENDED RELEASE ORAL
COMMUNITY
Start: 2024-07-30

## 2024-09-04 RX ADMIN — Medication 20 MG: at 13:00

## 2024-09-04 NOTE — PROGRESS NOTES
"Assessment/Plan:  1. Primary osteoarthritis of right knee  Large joint arthrocentesis: R knee      2. Chronic pain of right knee  Large joint arthrocentesis: R knee        Scribe Attestation      I,:  Yao Mane PA-C am acting as a scribe while in the presence of the attending physician.:       I,:  Leonel Rey, DO personally performed the services described in this documentation    as scribed in my presence.:           Jennifer is a very pleasant 68-year-old female very well-known to our practice presenting today for follow-up of her activity related right knee pain due to her underlying osteoarthritis.  She has seen reasonable relief from periodic viscosupplementation, most recently completed 10 months ago providing approximately 6 to 7 months worth of relief.  With the return of her activity related discomfort up to 8 out of 10, we felt it reasonable to reinitiate the series for her here today.  She consented to and underwent the first of 3 right knee Euflexxa injections as detailed below, which she tolerated well without difficulty or complication.  Postinjection instructions were provided.  We will plan to see her back next week and the week after to complete the series.  She would like to pursue surgery next October.  All questions addressed    Large joint arthrocentesis: R knee  Universal Protocol:  Consent: Verbal consent obtained.  Risks and benefits: risks, benefits and alternatives were discussed  Consent given by: patient  Time out: Immediately prior to procedure a \"time out\" was called to verify the correct patient, procedure, equipment, support staff and site/side marked as required.  Timeout called at: 9/4/2024 1:20 PM.  Site marked: the operative site was marked  Patient identity confirmed: verbally with patient  Supporting Documentation  Indications: pain   Procedure Details  Location: knee - R knee  Preparation: Patient was prepped and draped in the usual sterile fashion  Needle " size: 20 G  Ultrasound guidance: no  Approach: anterolateral  Medications administered: 20 mg Sodium Hyaluronate (Viscosup) 20 MG/2ML  Specialty Pharmacy Supplied: received medications from pharmacy  Patient tolerance: patient tolerated the procedure well with no immediate complications  Dressing:  Sterile dressing applied        Subjective: Right knee follow up    Patient ID: Jennifer Weems is a 68 y.o. female presenting today for follow-up of her active related right knee pain due to her underlying osteoarthritis.  She reports that she saw approximately 6 months with relief from the series of viscosupplementation that was completed last November.  She has had a return of her activity related discomfort over the past few months.  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  She denies any new injuries or falls.  She is considering surgery next October    Review of Systems   Constitutional:  Positive for activity change.   HENT: Negative.     Eyes: Negative.    Respiratory: Negative.     Cardiovascular: Negative.    Gastrointestinal: Negative.    Endocrine: Negative.    Genitourinary: Negative.    Musculoskeletal:  Positive for arthralgias, gait problem, joint swelling and myalgias.   Skin: Negative.    Allergic/Immunologic: Negative.    Hematological: Negative.    Psychiatric/Behavioral: Negative.       Past Medical History:   Diagnosis Date    Acquired acanthosis nigricans     Anxiety     Depression 10/03/2017    Essential hypertension     HLD (hyperlipidemia)     Hypertension     Prediabetes     Type 2 diabetes mellitus with stage 2 chronic kidney disease, without long-term current use of insulin  ([facility]) 09/05/2023    Type 2 diabetes mellitus without complication ([facility])     last assessed: 10/03/2017    Type 2 diabetes mellitus without complication, without long-term current use of insulin ([facility]) 03/04/2021    Type 2 diabetes  mellitus without complication, without long-term current use of insulin ([facility]) 2022    Formatting of this note might be different from the original.  Patient was found to have hyperglycemia prediabetic and has been on oral hypoglycemic agents for the last 6 or 7 years.  Currently is stable and her hemoglobin A1c has been around 6%.     Last Assessment & Plan:   Formatting of this note might be different from the original.  Diet and lifestyle modification, patient teaching and educat       Past Surgical History:   Procedure Laterality Date    APPENDECTOMY      TOTAL ABDOMINAL HYSTERECTOMY      age 34    US GUIDED INJECTION FOR RESEARCH STUDY  2018       Family History   Problem Relation Age of Onset    Diabetes Mother             Stroke Mother     Depression Mother     No Known Problems Father     No Known Problems Sister     No Known Problems Daughter     No Known Problems Brother     No Known Problems Brother     No Known Problems Brother     No Known Problems Half-Sister     Schizoaffective Disorder  Cousin         Aunt       Social History     Occupational History    Not on file   Tobacco Use    Smoking status: Never    Smokeless tobacco: Never   Vaping Use    Vaping status: Never Used   Substance and Sexual Activity    Alcohol use: No    Drug use: No    Sexual activity: Not Currently     Partners: Male         Current Outpatient Medications:     amLODIPine (NORVASC) 10 mg tablet, Take 1 tablet (10 mg total) by mouth daily, Disp: 90 tablet, Rfl: 1    ammonium lactate (LAC-HYDRIN) 12 % lotion, APPLY TOPICALLY TO THE AFFECTED AREA DAILY AS NEEDED FOR DRY SKIN, Disp: 400 g, Rfl: 2    ARIPiprazole (ABILIFY) 5 mg tablet, Take 5 mg by mouth daily, Disp: , Rfl:     Auvelity  MG TBCR, TAKE 1 TABLET BY MOUTH TWICE DAILY IN THE MORNING, Disp: , Rfl:     DULoxetine (CYMBALTA) 60 mg delayed release capsule, Take 60 mg by mouth in the morning., Disp: , Rfl:     eszopiclone (LUNESTA) tablet, Take 3  mg by mouth daily at bedtime as needed, Disp: , Rfl: 0    ThEfj-EgJofp-VN-B Cmp-C-Biot (INTEGRA PLUS) CAPS, Take 1 tablet by mouth daily, Disp: , Rfl: 0    Janumet  MG per tablet, TAKE 1 TABLET BY MOUTH TWICE DAILY, Disp: 180 tablet, Rfl: 1    rosuvastatin (CRESTOR) 10 MG tablet, TAKE 1 TABLET BY MOUTH MONDAY, WEDNESDAY, AND FRIDAY, Disp: 30 tablet, Rfl: 5    solifenacin (VESICARE) 10 MG tablet, Take 1 tablet (10 mg total) by mouth daily, Disp: 90 tablet, Rfl: 1    tazarotene (AVAGE) 0.1 % cream, Apply topically daily, Disp: , Rfl: 0    valsartan (DIOVAN) 160 mg tablet, Take 1 tablet (160 mg total) by mouth daily, Disp: 90 tablet, Rfl: 1    WELLBUTRIN  MG 24 hr tablet, Take 1 tablet by mouth daily, Disp: , Rfl: 0    Allergies   Allergen Reactions    Escitalopram Rash    Sulfa Antibiotics Hives and Rash    Food Other (See Comments)     Annotation - [id number]: CORN - causes extreme sleepiness       Objective:  There were no vitals filed for this visit.    There is no height or weight on file to calculate BMI.    Right Knee Exam     Muscle Strength   The patient has normal right knee strength.    Tenderness   The patient is experiencing tenderness in the medial joint line and MCL.    Range of Motion   Extension:  0 normal   Flexion:  120 normal     Tests   Varus: negative Valgus: negative  Drawer:  Anterior - negative      Patellar apprehension: negative    Other   Erythema: absent  Scars: absent  Sensation: normal  Pulse: present  Swelling: none  Effusion: no effusion present    Comments:  Stable at 0, 30, and 90  Thigh and calf soft and nontender  Ambulates with a slightly antalgic gait on the right without assistive device  Grossly distally neurovascular intact          Observations     Right Knee   Negative for effusion.       Physical Exam  Vitals and nursing note reviewed.   Constitutional:       Appearance: Normal appearance. She is well-developed.      Comments: There is no height or weight on file  to calculate BMI.   HENT:      Head: Normocephalic and atraumatic.      Right Ear: External ear normal.      Left Ear: External ear normal.   Eyes:      Extraocular Movements: Extraocular movements intact.      Conjunctiva/sclera: Conjunctivae normal.   Cardiovascular:      Rate and Rhythm: Normal rate.      Pulses: Normal pulses.   Pulmonary:      Effort: Pulmonary effort is normal.   Musculoskeletal:      Cervical back: Normal range of motion.      Right knee: No effusion.      Comments: See ortho exam   Skin:     General: Skin is warm and dry.   Neurological:      General: No focal deficit present.      Mental Status: She is alert and oriented to person, place, and time. Mental status is at baseline.   Psychiatric:         Mood and Affect: Mood normal.         Behavior: Behavior normal.         Thought Content: Thought content normal.         Judgment: Judgment normal.       This document was created using speech voice recognition software.   Grammatical errors, random word insertions, pronoun errors, and incomplete sentences are an occasional consequence of this system due to software limitations, ambient noise, and hardware issues.   Any formal questions or concerns about content, text, or information contained within the body of this dictation should be directly addressed to the provider for clarification.

## 2024-09-11 ENCOUNTER — PROCEDURE VISIT (OUTPATIENT)
Dept: OBGYN CLINIC | Facility: CLINIC | Age: 69
End: 2024-09-11
Payer: COMMERCIAL

## 2024-09-11 VITALS
HEIGHT: 68 IN | BODY MASS INDEX: 32.89 KG/M2 | HEART RATE: 79 BPM | DIASTOLIC BLOOD PRESSURE: 80 MMHG | SYSTOLIC BLOOD PRESSURE: 130 MMHG | WEIGHT: 217 LBS

## 2024-09-11 DIAGNOSIS — M17.11 PRIMARY OSTEOARTHRITIS OF RIGHT KNEE: Primary | ICD-10-CM

## 2024-09-11 DIAGNOSIS — M25.561 CHRONIC PAIN OF RIGHT KNEE: ICD-10-CM

## 2024-09-11 DIAGNOSIS — G89.29 CHRONIC PAIN OF RIGHT KNEE: ICD-10-CM

## 2024-09-11 PROCEDURE — 20610 DRAIN/INJ JOINT/BURSA W/O US: CPT | Performed by: ORTHOPAEDIC SURGERY

## 2024-09-11 RX ADMIN — Medication 20 MG: at 16:00

## 2024-09-11 NOTE — PROGRESS NOTES
"Assessment/Plan:  1. Primary osteoarthritis of right knee  Large joint arthrocentesis: R knee      2. Chronic pain of right knee  Large joint arthrocentesis: R knee        Scribe Attestation      I,:  Yao Mane PA-C am acting as a scribe while in the presence of the attending physician.:       I,:  Leonel Rey, DO personally performed the services described in this documentation    as scribed in my presence.:           Jennifer is a pleasant 68-year-old presenting today for the second of 3 Euflexxa injections for her right knee osteoarthritis and activity related pain.  She consented to and underwent the injection as detailed below, which she tolerated well without difficulty or complication.  Postinjection instructions were provided.  We will plan to see her back next week to complete the series.  All questions addressed    Large joint arthrocentesis: R knee  Universal Protocol:  Consent: Verbal consent obtained.  Risks and benefits: risks, benefits and alternatives were discussed  Consent given by: patient  Time out: Immediately prior to procedure a \"time out\" was called to verify the correct patient, procedure, equipment, support staff and site/side marked as required.  Timeout called at: 9/11/2024 3:06 PM.  Site marked: the operative site was marked  Patient identity confirmed: verbally with patient  Supporting Documentation  Indications: pain   Procedure Details  Location: knee - R knee  Preparation: Patient was prepped and draped in the usual sterile fashion  Needle size: 20 G  Ultrasound guidance: no  Approach: anterolateral  Medications administered: 20 mg Sodium Hyaluronate (Viscosup) 20 MG/2ML  Specialty Pharmacy Supplied: received medications from pharmacy  Patient tolerance: patient tolerated the procedure well with no immediate complications  Dressing:  Sterile dressing applied            Subjective: Right knee Euflexxa #2    Patient ID: Jennifer Weems is a 68 y.o. female " presenting today for the second of 3 Euflexxa injections for her right knee.  She denies new injury    Review of Systems      Past Medical History:   Diagnosis Date    Acquired acanthosis nigricans     Anxiety     Depression 10/03/2017    Essential hypertension     HLD (hyperlipidemia)     Hypertension     Prediabetes     Type 2 diabetes mellitus with stage 2 chronic kidney disease, without long-term current use of insulin  ([facility]) 2023    Type 2 diabetes mellitus without complication ([facility])     last assessed: 10/03/2017    Type 2 diabetes mellitus without complication, without long-term current use of insulin ([facility]) 2021    Type 2 diabetes mellitus without complication, without long-term current use of insulin ([facility]) 2022    Formatting of this note might be different from the original.  Patient was found to have hyperglycemia prediabetic and has been on oral hypoglycemic agents for the last 6 or 7 years.  Currently is stable and her hemoglobin A1c has been around 6%.     Last Assessment & Plan:   Formatting of this note might be different from the original.  Diet and lifestyle modification, patient teaching and educat       Past Surgical History:   Procedure Laterality Date    APPENDECTOMY      TOTAL ABDOMINAL HYSTERECTOMY      age 34    US GUIDED INJECTION FOR RESEARCH STUDY  2018       Family History   Problem Relation Age of Onset    Diabetes Mother             Stroke Mother     Depression Mother     No Known Problems Father     No Known Problems Sister     No Known Problems Daughter     No Known Problems Brother     No Known Problems Brother     No Known Problems Brother     No Known Problems Half-Sister     Schizoaffective Disorder  Cousin         Aunt       Social History     Occupational History    Not on file   Tobacco Use    Smoking status: Never    Smokeless tobacco: Never   Vaping Use    Vaping status: Never Used   Substance and Sexual Activity    Alcohol use: No    Drug use:  No    Sexual activity: Not Currently     Partners: Male         Current Outpatient Medications:     amLODIPine (NORVASC) 10 mg tablet, Take 1 tablet (10 mg total) by mouth daily, Disp: 90 tablet, Rfl: 1    ammonium lactate (LAC-HYDRIN) 12 % lotion, APPLY TOPICALLY TO THE AFFECTED AREA DAILY AS NEEDED FOR DRY SKIN, Disp: 400 g, Rfl: 2    ARIPiprazole (ABILIFY) 5 mg tablet, Take 5 mg by mouth daily, Disp: , Rfl:     Auvelity  MG TBCR, TAKE 1 TABLET BY MOUTH TWICE DAILY IN THE MORNING, Disp: , Rfl:     DULoxetine (CYMBALTA) 60 mg delayed release capsule, Take 60 mg by mouth in the morning., Disp: , Rfl:     eszopiclone (LUNESTA) tablet, Take 3 mg by mouth daily at bedtime as needed, Disp: , Rfl: 0    KvFae-KyNjax-LV-B Cmp-C-Biot (INTEGRA PLUS) CAPS, Take 1 tablet by mouth daily, Disp: , Rfl: 0    Janumet  MG per tablet, TAKE 1 TABLET BY MOUTH TWICE DAILY, Disp: 180 tablet, Rfl: 1    rosuvastatin (CRESTOR) 10 MG tablet, TAKE 1 TABLET BY MOUTH MONDAY, WEDNESDAY, AND FRIDAY, Disp: 30 tablet, Rfl: 5    solifenacin (VESICARE) 10 MG tablet, Take 1 tablet (10 mg total) by mouth daily, Disp: 90 tablet, Rfl: 1    tazarotene (AVAGE) 0.1 % cream, Apply topically daily, Disp: , Rfl: 0    valsartan (DIOVAN) 160 mg tablet, Take 1 tablet (160 mg total) by mouth daily, Disp: 90 tablet, Rfl: 1    WELLBUTRIN  MG 24 hr tablet, Take 1 tablet by mouth daily, Disp: , Rfl: 0    Allergies   Allergen Reactions    Escitalopram Rash    Sulfa Antibiotics Hives and Rash    Food Other (See Comments)     Annotation - [id number]: CORN - causes extreme sleepiness       Objective:  Vitals:    09/11/24 1504   BP: 130/80   Pulse: 79       Body mass index is 33.27 kg/m².    Ortho Exam    Physical Exam      This document was created using speech voice recognition software.   Grammatical errors, random word insertions, pronoun errors, and incomplete sentences are an occasional consequence of this system due to software limitations, ambient  noise, and hardware issues.   Any formal questions or concerns about content, text, or information contained within the body of this dictation should be directly addressed to the provider for clarification.

## 2024-09-12 RX ORDER — HYALURONATE SODIUM 10 MG/ML
20 SYRINGE (ML) INTRAARTICULAR
Status: COMPLETED | OUTPATIENT
Start: 2024-09-11 | End: 2024-09-11

## 2024-09-18 ENCOUNTER — OFFICE VISIT (OUTPATIENT)
Dept: INTERNAL MEDICINE CLINIC | Age: 69
End: 2024-09-18
Payer: COMMERCIAL

## 2024-09-18 ENCOUNTER — PROCEDURE VISIT (OUTPATIENT)
Dept: OBGYN CLINIC | Facility: CLINIC | Age: 69
End: 2024-09-18
Payer: COMMERCIAL

## 2024-09-18 VITALS
OXYGEN SATURATION: 95 % | HEART RATE: 75 BPM | DIASTOLIC BLOOD PRESSURE: 80 MMHG | TEMPERATURE: 98.3 F | BODY MASS INDEX: 33.54 KG/M2 | WEIGHT: 218.8 LBS | SYSTOLIC BLOOD PRESSURE: 124 MMHG

## 2024-09-18 VITALS — DIASTOLIC BLOOD PRESSURE: 75 MMHG | HEART RATE: 86 BPM | SYSTOLIC BLOOD PRESSURE: 113 MMHG

## 2024-09-18 DIAGNOSIS — I10 ESSENTIAL HYPERTENSION: ICD-10-CM

## 2024-09-18 DIAGNOSIS — Z23 ENCOUNTER FOR IMMUNIZATION: ICD-10-CM

## 2024-09-18 DIAGNOSIS — R32 URINARY INCONTINENCE, UNSPECIFIED TYPE: ICD-10-CM

## 2024-09-18 DIAGNOSIS — E53.8 VITAMIN B12 DEFICIENCY: ICD-10-CM

## 2024-09-18 DIAGNOSIS — M17.11 PRIMARY OSTEOARTHRITIS OF RIGHT KNEE: Primary | ICD-10-CM

## 2024-09-18 DIAGNOSIS — E11.9 TYPE 2 DIABETES MELLITUS WITHOUT COMPLICATION, WITHOUT LONG-TERM CURRENT USE OF INSULIN (HCC): ICD-10-CM

## 2024-09-18 DIAGNOSIS — E78.2 MIXED HYPERLIPIDEMIA: ICD-10-CM

## 2024-09-18 DIAGNOSIS — G89.29 CHRONIC PAIN OF RIGHT KNEE: ICD-10-CM

## 2024-09-18 DIAGNOSIS — E63.9 NUTRITIONAL DEFICIENCY: ICD-10-CM

## 2024-09-18 DIAGNOSIS — N39.41 URGE INCONTINENCE: ICD-10-CM

## 2024-09-18 DIAGNOSIS — E11.22 TYPE 2 DIABETES MELLITUS WITH STAGE 2 CHRONIC KIDNEY DISEASE, WITHOUT LONG-TERM CURRENT USE OF INSULIN  (HCC): Primary | ICD-10-CM

## 2024-09-18 DIAGNOSIS — M25.561 CHRONIC PAIN OF RIGHT KNEE: ICD-10-CM

## 2024-09-18 DIAGNOSIS — N18.2 TYPE 2 DIABETES MELLITUS WITH STAGE 2 CHRONIC KIDNEY DISEASE, WITHOUT LONG-TERM CURRENT USE OF INSULIN  (HCC): Primary | ICD-10-CM

## 2024-09-18 DIAGNOSIS — E61.1 IRON DEFICIENCY: ICD-10-CM

## 2024-09-18 PROCEDURE — 90471 IMMUNIZATION ADMIN: CPT

## 2024-09-18 PROCEDURE — 99214 OFFICE O/P EST MOD 30 MIN: CPT | Performed by: PHYSICIAN ASSISTANT

## 2024-09-18 PROCEDURE — 20610 DRAIN/INJ JOINT/BURSA W/O US: CPT | Performed by: ORTHOPAEDIC SURGERY

## 2024-09-18 PROCEDURE — 90662 IIV NO PRSV INCREASED AG IM: CPT

## 2024-09-18 RX ORDER — SOLIFENACIN SUCCINATE 10 MG/1
10 TABLET, FILM COATED ORAL DAILY
Qty: 90 TABLET | Refills: 1 | Status: SHIPPED | OUTPATIENT
Start: 2024-09-18

## 2024-09-18 RX ORDER — HYALURONATE SODIUM 10 MG/ML
20 SYRINGE (ML) INTRAARTICULAR
Status: COMPLETED | OUTPATIENT
Start: 2024-09-18 | End: 2024-09-18

## 2024-09-18 RX ORDER — AMLODIPINE BESYLATE 10 MG/1
10 TABLET ORAL DAILY
Qty: 90 TABLET | Refills: 1 | Status: SHIPPED | OUTPATIENT
Start: 2024-09-18

## 2024-09-18 RX ADMIN — Medication 20 MG: at 11:15

## 2024-09-18 NOTE — PROGRESS NOTES
"Assessment/Plan:  1. Primary osteoarthritis of right knee  Large joint arthrocentesis: R knee      2. Chronic pain of right knee  Large joint arthrocentesis: R knee        Scribe Attestation      I,:  Yao Mane PA-C am acting as a scribe while in the presence of the attending physician.:       I,:  Leonel Rey, DO personally performed the services described in this documentation    as scribed in my presence.:           Jennifer is a pleasant 68-year-old presenting today for the 3rd of 3 Euflexxa injections for her right knee osteoarthritis and activity related pain.  She consented to and underwent the injection as detailed below, which she tolerated well without difficulty or complication.  Postinjection instructions were provided.  We will plan to see her back in 6 months.  All questions addressed    Large joint arthrocentesis: R knee  Universal Protocol:  Consent: Verbal consent obtained.  Risks and benefits: risks, benefits and alternatives were discussed  Consent given by: patient  Time out: Immediately prior to procedure a \"time out\" was called to verify the correct patient, procedure, equipment, support staff and site/side marked as required.  Timeout called at: 9/18/2024 11:17 AM.  Site marked: the operative site was marked  Patient identity confirmed: verbally with patient  Supporting Documentation  Indications: pain   Procedure Details  Location: knee - R knee  Preparation: Patient was prepped and draped in the usual sterile fashion  Needle size: 20 G  Ultrasound guidance: no  Approach: anterolateral  Medications administered: 20 mg Sodium Hyaluronate (Viscosup) 20 MG/2ML  Specialty Pharmacy Supplied: received medications from pharmacy  Patient tolerance: patient tolerated the procedure well with no immediate complications  Dressing:  Sterile dressing applied            Subjective: Right knee Euflexxa #3    Patient ID: Jennifer Weems is a 68 y.o. female presenting today for the 3rd of " 3 Euflexxa injections for her right knee.  She denies new injury    Review of Systems      Past Medical History:   Diagnosis Date    Acquired acanthosis nigricans     Anxiety     Depression 10/03/2017    Essential hypertension     HLD (hyperlipidemia)     Hypertension     Prediabetes     Type 2 diabetes mellitus with stage 2 chronic kidney disease, without long-term current use of insulin  ([facility]) 2023    Type 2 diabetes mellitus without complication ([facility])     last assessed: 10/03/2017    Type 2 diabetes mellitus without complication, without long-term current use of insulin ([facility]) 2021    Type 2 diabetes mellitus without complication, without long-term current use of insulin ([facility]) 2022    Formatting of this note might be different from the original.  Patient was found to have hyperglycemia prediabetic and has been on oral hypoglycemic agents for the last 6 or 7 years.  Currently is stable and her hemoglobin A1c has been around 6%.     Last Assessment & Plan:   Formatting of this note might be different from the original.  Diet and lifestyle modification, patient teaching and educat       Past Surgical History:   Procedure Laterality Date    APPENDECTOMY      TOTAL ABDOMINAL HYSTERECTOMY      age 34    US GUIDED INJECTION FOR RESEARCH STUDY  2018       Family History   Problem Relation Age of Onset    Diabetes Mother             Stroke Mother     Depression Mother     No Known Problems Father     No Known Problems Sister     No Known Problems Daughter     No Known Problems Brother     No Known Problems Brother     No Known Problems Brother     No Known Problems Half-Sister     Schizoaffective Disorder  Cousin         Aunt       Social History     Occupational History    Not on file   Tobacco Use    Smoking status: Never    Smokeless tobacco: Never   Vaping Use    Vaping status: Never Used   Substance and Sexual Activity    Alcohol use: No    Drug use: No    Sexual activity: Not  Currently     Partners: Male         Current Outpatient Medications:     amLODIPine (NORVASC) 10 mg tablet, Take 1 tablet (10 mg total) by mouth daily, Disp: 90 tablet, Rfl: 1    ammonium lactate (LAC-HYDRIN) 12 % lotion, APPLY TOPICALLY TO THE AFFECTED AREA DAILY AS NEEDED FOR DRY SKIN, Disp: 400 g, Rfl: 2    ARIPiprazole (ABILIFY) 5 mg tablet, Take 5 mg by mouth daily, Disp: , Rfl:     Auvelity  MG TBCR, TAKE 1 TABLET BY MOUTH TWICE DAILY IN THE MORNING, Disp: , Rfl:     DULoxetine (CYMBALTA) 60 mg delayed release capsule, Take 60 mg by mouth in the morning., Disp: , Rfl:     eszopiclone (LUNESTA) tablet, Take 3 mg by mouth daily at bedtime as needed, Disp: , Rfl: 0    [id number] Cmp-C-Biot (INTEGRA PLUS) CAPS, Take 1 tablet by mouth daily, Disp: , Rfl: 0    Janumet  MG per tablet, TAKE 1 TABLET BY MOUTH TWICE DAILY, Disp: 180 tablet, Rfl: 1    rosuvastatin (CRESTOR) 10 MG tablet, TAKE 1 TABLET BY MOUTH MONDAY, WEDNESDAY, AND FRIDAY, Disp: 30 tablet, Rfl: 5    solifenacin (VESICARE) 10 MG tablet, Take 1 tablet (10 mg total) by mouth daily, Disp: 90 tablet, Rfl: 1    tazarotene (AVAGE) 0.1 % cream, Apply topically daily, Disp: , Rfl: 0    valsartan (DIOVAN) 160 mg tablet, Take 1 tablet (160 mg total) by mouth daily, Disp: 90 tablet, Rfl: 1    WELLBUTRIN  MG 24 hr tablet, Take 1 tablet by mouth daily, Disp: , Rfl: 0    Allergies   Allergen Reactions    Escitalopram Rash    Sulfa Antibiotics Hives and Rash    Food Other (See Comments)     Annotation - [id number]: CORN - causes extreme sleepiness       Objective:  Vitals:    09/18/24 1106   BP: 113/75   Pulse: 86       There is no height or weight on file to calculate BMI.    Ortho Exam    Physical Exam      This document was created using speech voice recognition software.   Grammatical errors, random word insertions, pronoun errors, and incomplete sentences are an occasional consequence of this system due to software limitations, ambient  noise, and hardware issues.   Any formal questions or concerns about content, text, or information contained within the body of this dictation should be directly addressed to the provider for clarification.

## 2024-09-18 NOTE — PROGRESS NOTES
Assessment/Plan:         Diagnoses and all orders for this visit:    Type 2 diabetes mellitus with stage 2 chronic kidney disease, without long-term current use of insulin  (HCC)  Comments:  discussed GLP - 1 medications, pt wishes to consider  has apt with weight management    Essential hypertension  Comments:  well controlled  in the 120's  Orders:  -     amLODIPine (NORVASC) 10 mg tablet; Take 1 tablet (10 mg total) by mouth daily  -     CBC and differential; Future  -     Comprehensive metabolic panel; Future  -     TSH, 3rd generation; Future  -     Hemoglobin A1C; Future    Type 2 diabetes mellitus without complication, without long-term current use of insulin (HCC)  Comments:  needs hgba1c,Blood sugars 126-128, continue diet and meds  Orders:  -     solifenacin (VESICARE) 10 MG tablet; Take 1 tablet (10 mg total) by mouth daily  -     CBC and differential; Future  -     Comprehensive metabolic panel; Future  -     TSH, 3rd generation; Future  -     Hemoglobin A1C; Future    Urinary incontinence, unspecified type  Comments:  to see urogyn and increase vesicare to 10 mg  Orders:  -     solifenacin (VESICARE) 10 MG tablet; Take 1 tablet (10 mg total) by mouth daily    Urge incontinence  -     solifenacin (VESICARE) 10 MG tablet; Take 1 tablet (10 mg total) by mouth daily    Mixed hyperlipidemia  -     CBC and differential; Future  -     Comprehensive metabolic panel; Future  -     TSH, 3rd generation; Future  -     Hemoglobin A1C; Future    Vitamin B12 deficiency  -     Vitamin B12; Future    Nutritional deficiency  -     Vitamin B12; Future  -     Iron Panel (Includes Ferritin, Iron Sat%, Iron, and TIBC); Future    Iron deficiency  -     Iron Panel (Includes Ferritin, Iron Sat%, Iron, and TIBC); Future          Subjective:      Patient ID: Jennifer Weems is a 68 y.o. female.    69 y/o female with hx of dm, htn, hld, depression presents for f/u   Pt scheduled to see weight management next week - would like  to consider GLP-1 medications  Pt labs reviewed (4/2024)            The following portions of the patient's history were reviewed and updated as appropriate: allergies, current medications, past family history, past medical history, past social history, past surgical history, and problem list.    Review of Systems   Constitutional:  Negative for activity change, appetite change, chills, diaphoresis and fever.   HENT:  Negative for congestion and sore throat.    Eyes:  Negative for pain and redness.   Respiratory:  Negative for cough, shortness of breath and wheezing.    Cardiovascular:  Negative for chest pain, palpitations and leg swelling.   Gastrointestinal:  Negative for abdominal pain, constipation and diarrhea.   Genitourinary:  Negative for dysuria.   Musculoskeletal:  Negative for arthralgias, back pain and gait problem.   Skin:  Negative for rash.   Neurological:  Negative for dizziness, light-headedness and headaches.   Psychiatric/Behavioral:  Negative for sleep disturbance. The patient is not nervous/anxious.          Past Medical History:   Diagnosis Date    Acquired acanthosis nigricans     Anxiety     Depression 10/03/2017    Essential hypertension     HLD (hyperlipidemia)     Hypertension     Prediabetes     Type 2 diabetes mellitus with stage 2 chronic kidney disease, without long-term current use of insulin  ([facility]) 09/05/2023    Type 2 diabetes mellitus without complication ([facility])     last assessed: 10/03/2017    Type 2 diabetes mellitus without complication, without long-term current use of insulin ([facility]) 03/04/2021    Type 2 diabetes mellitus without complication, without long-term current use of insulin ([facility]) 11/29/2022    Formatting of this note might be different from the original.  Patient was found to have hyperglycemia prediabetic and has been on oral hypoglycemic agents for the last 6 or 7 years.  Currently is stable and her hemoglobin A1c has been around 6%.     Last Assessment & Plan:    Formatting of this note might be different from the original.  Diet and lifestyle modification, patient teaching and educat         Current Outpatient Medications:     amLODIPine (NORVASC) 10 mg tablet, Take 1 tablet (10 mg total) by mouth daily, Disp: 90 tablet, Rfl: 1    ammonium lactate (LAC-HYDRIN) 12 % lotion, APPLY TOPICALLY TO THE AFFECTED AREA DAILY AS NEEDED FOR DRY SKIN, Disp: 400 g, Rfl: 2    ARIPiprazole (ABILIFY) 5 mg tablet, Take 5 mg by mouth daily, Disp: , Rfl:     Auvelity  MG TBCR, TAKE 1 TABLET BY MOUTH TWICE DAILY IN THE MORNING, Disp: , Rfl:     DULoxetine (CYMBALTA) 60 mg delayed release capsule, Take 60 mg by mouth in the morning., Disp: , Rfl:     eszopiclone (LUNESTA) tablet, Take 3 mg by mouth daily at bedtime as needed, Disp: , Rfl: 0    [id number] Cmp-C-Biot (INTEGRA PLUS) CAPS, Take 1 tablet by mouth daily, Disp: , Rfl: 0    Janumet  MG per tablet, TAKE 1 TABLET BY MOUTH TWICE DAILY, Disp: 180 tablet, Rfl: 1    rosuvastatin (CRESTOR) 10 MG tablet, TAKE 1 TABLET BY MOUTH MONDAY, WEDNESDAY, AND FRIDAY, Disp: 30 tablet, Rfl: 5    solifenacin (VESICARE) 10 MG tablet, Take 1 tablet (10 mg total) by mouth daily, Disp: 90 tablet, Rfl: 1    tazarotene (AVAGE) 0.1 % cream, Apply topically daily, Disp: , Rfl: 0    valsartan (DIOVAN) 160 mg tablet, Take 1 tablet (160 mg total) by mouth daily, Disp: 90 tablet, Rfl: 1    WELLBUTRIN  MG 24 hr tablet, Take 1 tablet by mouth daily (Patient not taking: Reported on 9/18/2024), Disp: , Rfl: 0  No current facility-administered medications for this visit.    Allergies   Allergen Reactions    Escitalopram Rash    Sulfa Antibiotics Hives and Rash    Food Other (See Comments)     Annotation - [id number]: CORN - causes extreme sleepiness       Social History   Past Surgical History:   Procedure Laterality Date    APPENDECTOMY      TOTAL ABDOMINAL HYSTERECTOMY  1989    age 34    US GUIDED INJECTION FOR RESEARCH STUDY  8/2/2018     Family  History   Problem Relation Age of Onset    Diabetes Mother             Stroke Mother     Depression Mother     No Known Problems Father     No Known Problems Sister     No Known Problems Daughter     No Known Problems Brother     No Known Problems Brother     No Known Problems Brother     No Known Problems Half-Sister     Schizoaffective Disorder  Cousin         Aunt       Objective:  /80 (BP Location: Left arm, Patient Position: Sitting, Cuff Size: Standard)   Pulse 75   Temp 98.3 °F (36.8 °C) (Temporal)   Wt 99.2 kg (218 lb 12.8 oz)   SpO2 95%   BMI 33.54 kg/m²        Physical Exam  Vitals reviewed.   Constitutional:       General: She is not in acute distress.  HENT:      Head: Normocephalic and atraumatic.      Nose: Nose normal.      Mouth/Throat:      Mouth: Mucous membranes are moist.   Eyes:      General:         Right eye: No discharge.         Left eye: No discharge.      Extraocular Movements: Extraocular movements intact.      Conjunctiva/sclera: Conjunctivae normal.      Pupils: Pupils are equal, round, and reactive to light.   Cardiovascular:      Rate and Rhythm: Normal rate and regular rhythm.      Pulses: no weak pulses.           Dorsalis pedis pulses are 2+ on the right side and 2+ on the left side.        Posterior tibial pulses are 2+ on the right side and 2+ on the left side.   Pulmonary:      Effort: Pulmonary effort is normal. No respiratory distress.      Breath sounds: Normal breath sounds. No wheezing or rales.   Abdominal:      General: Bowel sounds are normal. There is no distension.   Musculoskeletal:      Cervical back: Normal range of motion. No rigidity.      Right lower leg: No edema.      Left lower leg: No edema.        Feet:    Feet:      Right foot:      Skin integrity: Dry skin present. No ulcer, skin breakdown, erythema, warmth or callus.      Left foot:      Skin integrity: Dry skin present. No ulcer, skin breakdown, erythema, warmth or callus.    Lymphadenopathy:      Cervical: No cervical adenopathy.   Skin:     General: Skin is warm.      Findings: No rash.   Neurological:      General: No focal deficit present.      Mental Status: She is alert and oriented to person, place, and time.   Psychiatric:         Mood and Affect: Mood normal.         Behavior: Behavior normal.       Diabetic Foot Exam    Patient's shoes and socks removed.    Right Foot/Ankle   Right Foot Inspection  Skin Exam: skin normal, skin intact and dry skin. No warmth, no callus, no erythema, no maceration, no abnormal color, no pre-ulcer, no ulcer and no callus.     Toe Exam: ROM and strength within normal limits. No swelling, no tenderness and erythema    Sensory   Proprioception: intact  Monofilament testing: intact    Vascular  The right DP pulse is 2+. The right PT pulse is 2+.     Left Foot/Ankle  Left Foot Inspection  Skin Exam: skin normal, skin intact and dry skin. No warmth, no erythema, no maceration, normal color, no pre-ulcer, no ulcer and no callus.     Toe Exam: ROM and strength within normal limits. No swelling, no tenderness and no erythema.     Sensory   Proprioception: intact  Monofilament testing: diminished    Vascular  The left DP pulse is 2+. The left PT pulse is 2+.     Assign Risk Category  No deformity present  No loss of protective sensation  No weak pulses  Risk: 0

## 2024-09-26 LAB
25(OH)D3+25(OH)D2 SERPL-MCNC: 44 NG/ML (ref 30–100)
ALBUMIN SERPL-MCNC: 4.5 G/DL (ref 3.5–5.7)
ALP SERPL-CCNC: 51 U/L (ref 35–120)
ALT SERPL-CCNC: 19 U/L
ANION GAP SERPL CALCULATED.3IONS-SCNC: 9 MMOL/L (ref 3–11)
AST SERPL-CCNC: 17 U/L
BASOPHILS # BLD AUTO: 0.1 THOU/CMM (ref 0–0.1)
BASOPHILS NFR BLD AUTO: 1 %
BILIRUB SERPL-MCNC: 0.5 MG/DL (ref 0.2–1)
BUN SERPL-MCNC: 13 MG/DL (ref 7–25)
CALCIUM SERPL-MCNC: 10 MG/DL (ref 8.5–10.1)
CHLORIDE SERPL-SCNC: 103 MMOL/L (ref 100–109)
CO2 SERPL-SCNC: 28 MMOL/L (ref 21–31)
CREAT SERPL-MCNC: 0.91 MG/DL (ref 0.4–1.1)
CYTOLOGY CMNT CVX/VAG CYTO-IMP: ABNORMAL
DIFFERENTIAL METHOD BLD: ABNORMAL
EOSINOPHIL # BLD AUTO: 0.2 THOU/CMM (ref 0–0.5)
EOSINOPHIL NFR BLD AUTO: 3 %
ERYTHROCYTE [DISTWIDTH] IN BLOOD BY AUTOMATED COUNT: 14.1 % (ref 12–16)
EST. AVERAGE GLUCOSE BLD GHB EST-MCNC: 148 MG/DL
FERRITIN SERPL-MCNC: 47 NG/ML (ref 11–306.8)
GFR/BSA.PRED SERPLBLD CYS-BASED-ARV: 68 ML/MIN/{1.73_M2}
GLUCOSE SERPL-MCNC: 113 MG/DL (ref 65–99)
HBA1C MFR BLD: 6.8 %
HCT VFR BLD AUTO: 45.8 % (ref 35–43)
HGB BLD-MCNC: 15.3 G/DL (ref 11.5–14.5)
IRON SATN MFR SERPL: 26 % (ref 20–50)
IRON SERPL-MCNC: 93 UG/DL (ref 50–212)
LYMPHOCYTES # BLD AUTO: 1.4 THOU/CMM (ref 1–3)
LYMPHOCYTES NFR BLD AUTO: 22 %
MAGNESIUM SERPL-MCNC: 1.9 MG/DL (ref 1.4–2.2)
MCH RBC QN AUTO: 29.1 PG (ref 26–34)
MCHC RBC AUTO-ENTMCNC: 33.4 G/DL (ref 32–37)
MCV RBC AUTO: 87 FL (ref 80–100)
MONOCYTES # BLD AUTO: 0.4 THOU/CMM (ref 0.3–1)
MONOCYTES NFR BLD AUTO: 7 %
NEUTROPHILS # BLD AUTO: 4.3 THOU/CMM (ref 1.8–7.8)
NEUTROPHILS NFR BLD AUTO: 67 %
PLATELET # BLD AUTO: 262 THOU/CMM (ref 140–350)
PMV BLD REES-ECKER: 8.8 FL (ref 7.5–11.3)
POTASSIUM SERPL-SCNC: 4.2 MMOL/L (ref 3.5–5.2)
PROT SERPL-MCNC: 7.5 G/DL (ref 6.3–8.3)
RBC # BLD AUTO: 5.26 MILL/CMM (ref 3.7–4.7)
SODIUM SERPL-SCNC: 140 MMOL/L (ref 135–145)
TIBC SERPL-MCNC: 364 UG/DL (ref 260–430)
TRANSFERRIN SERPL-MCNC: 260 MG/DL (ref 203–362)
TSH SERPL-ACNC: 1.85 UIU/ML (ref 0.45–5.33)
VIT B12 SERPL-MCNC: 346 PG/ML (ref 180–914)
WBC # BLD AUTO: 6.4 THOU/CMM (ref 4–10)

## 2024-10-08 ENCOUNTER — TELEMEDICINE (OUTPATIENT)
Dept: INTERNAL MEDICINE CLINIC | Facility: OTHER | Age: 69
End: 2024-10-08
Payer: COMMERCIAL

## 2024-10-08 VITALS — WEIGHT: 204 LBS | BODY MASS INDEX: 30.92 KG/M2 | HEIGHT: 68 IN

## 2024-10-08 DIAGNOSIS — E78.2 MIXED HYPERLIPIDEMIA: ICD-10-CM

## 2024-10-08 DIAGNOSIS — N18.2 TYPE 2 DIABETES MELLITUS WITH STAGE 2 CHRONIC KIDNEY DISEASE, WITHOUT LONG-TERM CURRENT USE OF INSULIN  (HCC): Primary | ICD-10-CM

## 2024-10-08 DIAGNOSIS — E11.22 TYPE 2 DIABETES MELLITUS WITH STAGE 2 CHRONIC KIDNEY DISEASE, WITHOUT LONG-TERM CURRENT USE OF INSULIN  (HCC): Primary | ICD-10-CM

## 2024-10-08 DIAGNOSIS — U07.1 COVID-19 VIRUS INFECTION: ICD-10-CM

## 2024-10-08 DIAGNOSIS — E11.9 TYPE 2 DIABETES MELLITUS WITHOUT COMPLICATION, WITHOUT LONG-TERM CURRENT USE OF INSULIN (HCC): ICD-10-CM

## 2024-10-08 LAB
SARS-COV-2 AG UPPER RESP QL IA: POSITIVE
VALID CONTROL: ABNORMAL

## 2024-10-08 PROCEDURE — 87811 SARS-COV-2 COVID19 W/OPTIC: CPT | Performed by: INTERNAL MEDICINE

## 2024-10-08 PROCEDURE — 99214 OFFICE O/P EST MOD 30 MIN: CPT | Performed by: INTERNAL MEDICINE

## 2024-10-08 RX ORDER — TIRZEPATIDE 2.5 MG/.5ML
2.5 INJECTION, SOLUTION SUBCUTANEOUS
COMMUNITY
Start: 2024-09-27

## 2024-10-08 RX ORDER — NIRMATRELVIR AND RITONAVIR 300-100 MG
3 KIT ORAL 2 TIMES DAILY
Qty: 30 TABLET | Refills: 0 | Status: SHIPPED | OUTPATIENT
Start: 2024-10-08 | End: 2024-10-13

## 2024-10-08 NOTE — ASSESSMENT & PLAN NOTE
Lab Results   Component Value Date    HGBA1C 6.8 (H) 09/26/2024    HGBA1C 6.8 (H) 09/26/2024   Well-controlled on present regimen.  Continue with diabetic diet

## 2024-10-08 NOTE — ASSESSMENT & PLAN NOTE
Lab Results   Component Value Date    HGBA1C 6.8 (H) 09/26/2024    HGBA1C 6.8 (H) 09/26/2024   Well-controlled on present regimen.  Continue with diabetic diet.  Continue to monitor blood sugar at home

## 2024-10-08 NOTE — PROGRESS NOTES
Virtual Regular Visit  Name: Jennifer Weems      : 1955      MRN: [id number]  Encounter Provider: Victor Hugo Keenan MD  Encounter Date: 10/8/2024   Encounter department: [facility]    Verification of patient location:    Patient is located at Home in the following state in which I hold an active license [place]    Assessment & Plan  Type 2 diabetes mellitus with stage 2 chronic kidney disease, without long-term current use of insulin  (HCC)    Lab Results   Component Value Date    HGBA1C 6.8 (H) 2024    HGBA1C 6.8 (H) 2024   Well-controlled on present regimen.  Continue with diabetic diet.  Continue to monitor blood sugar at home         Type 2 diabetes mellitus without complication, without long-term current use of insulin (HCC)    Lab Results   Component Value Date    HGBA1C 6.8 (H) 2024    HGBA1C 6.8 (H) 2024   Well-controlled on present regimen.  Continue with diabetic diet         Mixed hyperlipidemia  Advised to hold Crestor for 1 week due to Paxlovid therapy         COVID-19 virus infection  Patient with risk factor treatment option with Paxlovid discussed and agreed to be prescribed.    Advised to hold Crestor for 1 week.  Also advised to take Tylenol as needed.  Over-the-counter Robitussin cough syrup as needed for cough.  Adequate oral hydration and physical rest.  Will give excuse for 5 days from work and will be tested in office because her job requires to be tested in physicians office.  Continue to use mask for 10 days    Orders:    nirmatrelvir & ritonavir (Paxlovid, 300/100,) tablet therapy pack; Take 3 tablets by mouth 2 (two) times a day for 5 days Take 2 nirmatrelvir tablets + 1 ritonavir tablet together per dose         Encounter provider Victor Hugo Keenan MD    The patient was identified by name and date of birth. Jennifer Weems was informed that this is a telemedicine visit and that the visit is being conducted through the  "[employer] Embedded platform. She agrees to proceed..  My office door was closed. No one else was in the room.  She acknowledged consent and understanding of privacy and security of the video platform. The patient has agreed to participate and understands they can discontinue the visit at any time.    Patient is aware this is a billable service.     History of Present Illness     Patient with sore throat, cough and sinus congestion.  Tested positive at home today.  No fever.  No nausea vomiting or diarrhea.          Review of Systems   Constitutional:  Positive for fatigue. Negative for fever.   HENT:  Positive for congestion, sinus pressure and sore throat. Negative for ear discharge, ear pain, postnasal drip, tinnitus and trouble swallowing.    Eyes:  Negative for discharge, itching and visual disturbance.   Respiratory:  Negative for cough and shortness of breath.    Cardiovascular:  Negative for chest pain and palpitations.   Gastrointestinal:  Negative for abdominal pain, diarrhea, nausea and vomiting.   Endocrine: Negative for cold intolerance and polyuria.   Genitourinary:  Negative for difficulty urinating, dysuria and urgency.   Musculoskeletal:  Negative for arthralgias and neck pain.   Skin:  Negative for rash.   Allergic/Immunologic: Negative for environmental allergies.   Neurological:  Negative for dizziness, weakness and headaches.   Psychiatric/Behavioral:  The patient is not nervous/anxious.            Objective     Ht 5' 7.5\" (1.715 m)   Wt 92.5 kg (204 lb)   BMI 31.48 kg/m²   Physical Exam  Constitutional:       Appearance: She is ill-appearing. She is not diaphoretic.   HENT:      Right Ear: External ear normal.      Left Ear: External ear normal.      Nose: Rhinorrhea present.      Mouth/Throat:      Pharynx: Posterior oropharyngeal erythema present. No oropharyngeal exudate.   Pulmonary:      Effort: No respiratory distress.   Abdominal:      General: There is no distension.   Musculoskeletal:      " Right lower leg: No edema.      Left lower leg: No edema.   Skin:     Findings: No bruising or erythema.   Neurological:      Mental Status: She is oriented to person, place, and time.   Psychiatric:         Mood and Affect: Mood normal.         Behavior: Behavior normal.         Visit Time  Total Visit Duration: 16 minutes

## 2024-10-08 NOTE — ASSESSMENT & PLAN NOTE
Patient with risk factor treatment option with Paxlovid discussed and agreed to be prescribed.    Advised to hold Crestor for 1 week.  Also advised to take Tylenol as needed.  Over-the-counter Robitussin cough syrup as needed for cough.  Adequate oral hydration and physical rest.  Will give excuse for 5 days from work and will be tested in office because her job requires to be tested in physicians office.  Continue to use mask for 10 days    Orders:    nirmatrelvir & ritonavir (Paxlovid, 300/100,) tablet therapy pack; Take 3 tablets by mouth 2 (two) times a day for 5 days Take 2 nirmatrelvir tablets + 1 ritonavir tablet together per dose

## 2024-10-16 ENCOUNTER — HOSPITAL ENCOUNTER (OUTPATIENT)
Dept: MAMMOGRAPHY | Facility: HOSPITAL | Age: 69
Discharge: HOME | End: 2024-10-16
Payer: COMMERCIAL

## 2024-10-16 VITALS — BODY MASS INDEX: 30.92 KG/M2 | WEIGHT: 204 LBS | HEIGHT: 68 IN

## 2024-10-16 DIAGNOSIS — Z12.31 ENCOUNTER FOR SCREENING MAMMOGRAM FOR MALIGNANT NEOPLASM OF BREAST: ICD-10-CM

## 2024-10-16 PROCEDURE — 77063 BREAST TOMOSYNTHESIS BI: CPT

## 2024-10-16 PROCEDURE — 77067 SCR MAMMO BI INCL CAD: CPT

## 2024-10-21 LAB
LEFT EYE DIABETIC RETINOPATHY: NORMAL
RIGHT EYE DIABETIC RETINOPATHY: NORMAL

## 2025-01-24 DIAGNOSIS — I10 HYPERTENSION, ESSENTIAL: ICD-10-CM

## 2025-01-24 RX ORDER — VALSARTAN 160 MG/1
TABLET ORAL
Qty: 90 TABLET | Refills: 1 | Status: SHIPPED | OUTPATIENT
Start: 2025-01-24

## 2025-01-29 DIAGNOSIS — R73.01 IMPAIRED FASTING GLUCOSE: ICD-10-CM

## 2025-01-29 RX ORDER — SITAGLIPTIN AND METFORMIN HYDROCHLORIDE 1000; 50 MG/1; MG/1
1 TABLET, FILM COATED ORAL 2 TIMES DAILY
Qty: 180 TABLET | Refills: 1 | Status: SHIPPED | OUTPATIENT
Start: 2025-01-29 | End: 2025-02-05

## 2025-02-05 ENCOUNTER — OFFICE VISIT (OUTPATIENT)
Dept: INTERNAL MEDICINE CLINIC | Age: 70
End: 2025-02-05
Payer: COMMERCIAL

## 2025-02-05 VITALS
OXYGEN SATURATION: 95 % | HEART RATE: 91 BPM | SYSTOLIC BLOOD PRESSURE: 112 MMHG | BODY MASS INDEX: 30.31 KG/M2 | HEIGHT: 68 IN | WEIGHT: 200 LBS | TEMPERATURE: 97.2 F | DIASTOLIC BLOOD PRESSURE: 78 MMHG

## 2025-02-05 DIAGNOSIS — R32 URINARY INCONTINENCE, UNSPECIFIED TYPE: ICD-10-CM

## 2025-02-05 DIAGNOSIS — E11.9 TYPE 2 DIABETES MELLITUS WITHOUT COMPLICATION, WITHOUT LONG-TERM CURRENT USE OF INSULIN (HCC): ICD-10-CM

## 2025-02-05 DIAGNOSIS — I10 ESSENTIAL HYPERTENSION: ICD-10-CM

## 2025-02-05 DIAGNOSIS — N18.2 TYPE 2 DIABETES MELLITUS WITH STAGE 2 CHRONIC KIDNEY DISEASE, WITHOUT LONG-TERM CURRENT USE OF INSULIN  (HCC): ICD-10-CM

## 2025-02-05 DIAGNOSIS — N39.41 URGE INCONTINENCE: ICD-10-CM

## 2025-02-05 DIAGNOSIS — E11.22 TYPE 2 DIABETES MELLITUS WITH STAGE 2 CHRONIC KIDNEY DISEASE, WITHOUT LONG-TERM CURRENT USE OF INSULIN  (HCC): ICD-10-CM

## 2025-02-05 DIAGNOSIS — E78.2 MIXED HYPERLIPIDEMIA: ICD-10-CM

## 2025-02-05 DIAGNOSIS — F32.A DEPRESSION, UNSPECIFIED DEPRESSION TYPE: ICD-10-CM

## 2025-02-05 DIAGNOSIS — Z23 NEED FOR PNEUMOCOCCAL 20-VALENT CONJUGATE VACCINATION: Primary | ICD-10-CM

## 2025-02-05 DIAGNOSIS — Z23 ENCOUNTER FOR IMMUNIZATION: ICD-10-CM

## 2025-02-05 PROCEDURE — 99214 OFFICE O/P EST MOD 30 MIN: CPT | Performed by: PHYSICIAN ASSISTANT

## 2025-02-05 PROCEDURE — 82043 UR ALBUMIN QUANTITATIVE: CPT | Performed by: PHYSICIAN ASSISTANT

## 2025-02-05 PROCEDURE — 90677 PCV20 VACCINE IM: CPT

## 2025-02-05 PROCEDURE — 82570 ASSAY OF URINE CREATININE: CPT | Performed by: PHYSICIAN ASSISTANT

## 2025-02-05 PROCEDURE — 90471 IMMUNIZATION ADMIN: CPT

## 2025-02-05 RX ORDER — SOLIFENACIN SUCCINATE 10 MG/1
10 TABLET, FILM COATED ORAL DAILY
Qty: 90 TABLET | Refills: 1 | Status: SHIPPED | OUTPATIENT
Start: 2025-02-05

## 2025-02-05 RX ORDER — ROSUVASTATIN CALCIUM 10 MG/1
TABLET, COATED ORAL
Qty: 90 TABLET | Refills: 1 | Status: SHIPPED | OUTPATIENT
Start: 2025-02-05

## 2025-02-05 RX ORDER — TIRZEPATIDE 10 MG/.5ML
10 INJECTION, SOLUTION SUBCUTANEOUS WEEKLY
Qty: 2 ML | Refills: 1 | Status: SHIPPED | OUTPATIENT
Start: 2025-02-05

## 2025-02-05 RX ORDER — TIRZEPATIDE 7.5 MG/.5ML
INJECTION, SOLUTION SUBCUTANEOUS
COMMUNITY
Start: 2025-01-08 | End: 2025-02-05

## 2025-02-05 RX ORDER — TRIAMCINOLONE ACETONIDE 1 MG/G
CREAM TOPICAL
COMMUNITY
Start: 2025-02-04

## 2025-02-05 NOTE — ASSESSMENT & PLAN NOTE
Orders:    CBC and differential; Future    Comprehensive metabolic panel; Future    Lipid panel; Future

## 2025-02-05 NOTE — PROGRESS NOTES
Name: Jennifer Weems      : 1955      MRN: [id number]  Encounter Provider: Brenda Murray PA-C  Encounter Date: 2025   Encounter department: [facility]  :  Assessment & Plan  Type 2 diabetes mellitus with stage 2 chronic kidney disease, without long-term current use of insulin  (HCC)  Increase mounjaro to 10mg weekly   Lab Results   Component Value Date    HGBA1C 6.8 (H) 2024    HGBA1C 6.8 (H) 2024       Orders:    Tirzepatide (Mounjaro) 10 MG/0.5ML SOAJ; Inject 10 mg under the skin once a week    CBC and differential; Future    Comprehensive metabolic panel; Future    Lipid panel; Future    Hemoglobin A1C; Future    TSH, 3rd generation; Future    Albumin / creatinine urine ratio; Future    Type 2 diabetes mellitus without complication, without long-term current use of insulin (HCC)  Discussed adding ca+ 500mg daily   Vitamin D stable    Lab Results   Component Value Date    HGBA1C 6.8 (H) 2024    HGBA1C 6.8 (H) 2024       Orders:    solifenacin (VESICARE) 10 MG tablet; Take 1 tablet (10 mg total) by mouth daily    Urinary incontinence, unspecified type    Orders:    solifenacin (VESICARE) 10 MG tablet; Take 1 tablet (10 mg total) by mouth daily    Urge incontinence    Orders:    solifenacin (VESICARE) 10 MG tablet; Take 1 tablet (10 mg total) by mouth daily    Mixed hyperlipidemia    Orders:    rosuvastatin (CRESTOR) 10 MG tablet; TAKE 1 TABLET BY MOUTH MONDAY, WEDNESDAY, AND FRIDAY    CBC and differential; Future    Comprehensive metabolic panel; Future    Lipid panel; Future    Hemoglobin A1C; Future    TSH, 3rd generation; Future    Essential hypertension  Well controlled   Orders:    CBC and differential; Future    Comprehensive metabolic panel; Future    Lipid panel; Future    Hemoglobin A1C; Future    TSH, 3rd generation; Future    Depression, unspecified depression type      Orders:    CBC and differential; Future    Comprehensive  "metabolic panel; Future    Lipid panel; Future    Encounter for immunization    Orders:    Pneumococcal Conjugate Vaccine 20-valent (Pcv20)    Need for pneumococcal 20-valent conjugate vaccination    Orders:    Pneumococcal Conjugate Vaccine 20-valent (Pcv20)           History of Present Illness   70 y/o female with DM, HTN, HLD,   Pt reports she started Mounjaro (prescribed online)  Has tapered up to 7.5mg weekly and is tolerating well  Pt states her job makes it difficult to go to gym, she is also caring for adult child   BS improving 120-130s     Pt following with Dr Victor for depression  Improved with auvelity   Pt has increased stressors at home       Review of Systems   Constitutional:  Negative for activity change, appetite change, chills, diaphoresis, fatigue and fever.   HENT:  Negative for congestion and sore throat.    Eyes:  Negative for pain and redness.   Respiratory:  Negative for cough and shortness of breath.    Cardiovascular:  Negative for chest pain and leg swelling.   Gastrointestinal:  Negative for abdominal pain, constipation, diarrhea and nausea.   Genitourinary:  Negative for dysuria and frequency.   Musculoskeletal:  Negative for arthralgias and back pain.   Skin:  Negative for rash.   Neurological:  Negative for dizziness, light-headedness and headaches.       Objective   /78 (BP Location: Left arm, Patient Position: Sitting, Cuff Size: Large)   Pulse 91   Temp (!) 97.2 °F (36.2 °C) (Temporal)   Ht 5' 7.5\" (1.715 m)   Wt 90.7 kg (200 lb) Comment: pt verbal  SpO2 95%   BMI 30.86 kg/m²      Physical Exam  Vitals reviewed.   Constitutional:       General: She is not in acute distress.  HENT:      Head: Normocephalic and atraumatic.      Nose: Nose normal.   Eyes:      General:         Right eye: No discharge.         Left eye: No discharge.      Extraocular Movements: Extraocular movements intact.      Conjunctiva/sclera: Conjunctivae normal.      Pupils: Pupils are equal, " round, and reactive to light.   Cardiovascular:      Rate and Rhythm: Normal rate and regular rhythm.   Pulmonary:      Effort: Pulmonary effort is normal. No respiratory distress.      Breath sounds: Normal breath sounds. No wheezing or rales.   Musculoskeletal:      Cervical back: Normal range of motion.      Right lower leg: No edema.      Left lower leg: No edema.   Neurological:      General: No focal deficit present.      Mental Status: She is alert.   Psychiatric:         Mood and Affect: Mood normal.

## 2025-02-05 NOTE — ASSESSMENT & PLAN NOTE
Discussed adding ca+ 500mg daily   Vitamin D stable    Lab Results   Component Value Date    HGBA1C 6.8 (H) 09/26/2024    HGBA1C 6.8 (H) 09/26/2024       Orders:    solifenacin (VESICARE) 10 MG tablet; Take 1 tablet (10 mg total) by mouth daily

## 2025-02-06 LAB
CREAT UR-MCNC: 188.3 MG/DL
MICROALBUMIN UR-MCNC: <7 MG/L

## 2025-02-11 NOTE — ASSESSMENT & PLAN NOTE
Orders:    rosuvastatin (CRESTOR) 10 MG tablet; TAKE 1 TABLET BY MOUTH MONDAY, WEDNESDAY, AND FRIDAY    CBC and differential; Future    Comprehensive metabolic panel; Future    Lipid panel; Future    Hemoglobin A1C; Future    TSH, 3rd generation; Future     no

## 2025-02-25 ENCOUNTER — TELEPHONE (OUTPATIENT)
Age: 70
End: 2025-02-25

## 2025-02-25 NOTE — TELEPHONE ENCOUNTER
Caller: patient  Doctor/office: Dr Rey  #: [phone]       Patient called to start auth/delivery for VISCO(Gel) injections.    Body Part: right knee  Pain Level (scale 1-10): 8/10  Date of last Gel Injection: 9/18/24  Did the last injection work well for you? yes      Educated patient on Visco procedure. Auth team will review insurance and process the request appropriately. Patient will be contacted if the have any questions and when ready to schedule.

## 2025-02-26 DIAGNOSIS — M17.11 PRIMARY OSTEOARTHRITIS OF RIGHT KNEE: Primary | ICD-10-CM

## 2025-03-14 ENCOUNTER — TELEPHONE (OUTPATIENT)
Age: 70
End: 2025-03-14

## 2025-03-20 DIAGNOSIS — E11.22 TYPE 2 DIABETES MELLITUS WITH STAGE 2 CHRONIC KIDNEY DISEASE, WITHOUT LONG-TERM CURRENT USE OF INSULIN  (HCC): ICD-10-CM

## 2025-03-20 DIAGNOSIS — N18.2 TYPE 2 DIABETES MELLITUS WITH STAGE 2 CHRONIC KIDNEY DISEASE, WITHOUT LONG-TERM CURRENT USE OF INSULIN  (HCC): ICD-10-CM

## 2025-03-20 RX ORDER — TIRZEPATIDE 12.5 MG/.5ML
12.5 INJECTION, SOLUTION SUBCUTANEOUS WEEKLY
Qty: 2 ML | Refills: 1 | Status: SHIPPED | OUTPATIENT
Start: 2025-03-20

## 2025-03-21 ENCOUNTER — TELEPHONE (OUTPATIENT)
Age: 70
End: 2025-03-21

## 2025-03-21 NOTE — TELEPHONE ENCOUNTER
PA for Mounjaro 12.5mg SUBMITTED to [employer]    via    []CMM-KEY:   [x]Surescripts-Case ID #: [id number]   []Availity-Auth ID #  []Faxed to plan   []Other website   []Phone call Case ID #     [x]PA sent as URGENT    All office notes, labs and other pertaining documents and studies sent. Clinical questions answered. Awaiting determination from insurance company.     Turnaround time for your insurance to make a decision on your Prior Authorization can take 7-21 business days.

## 2025-03-24 NOTE — TELEPHONE ENCOUNTER
PA for Mounjaro 12.5mg APPROVED     Date(s) approved March 21, 2025 to March 21, 2026     Case #: [id number]     Patient advised by          []MyChart Message  [x]Phone call- Patient verbalized understanding- Patient looking to  their medication today  []LMOM  []L/M to call office as no active Communication consent on file  []Unable to leave detailed message as VM not approved on Communication consent       Pharmacy advised by    [x]Fax  []Phone call  []Secure Chat       Approval letter scanned into Media Yes

## 2025-04-08 LAB
ALBUMIN SERPL-MCNC: 4.4 G/DL (ref 3.6–5.1)
ALBUMIN/GLOB SERPL: 1.3 (CALC) (ref 1–2.5)
ALP SERPL-CCNC: 54 U/L (ref 37–153)
ALT SERPL-CCNC: 17 U/L (ref 6–29)
AST SERPL-CCNC: 17 U/L (ref 10–35)
BASOPHILS # BLD AUTO: 49 CELLS/UL (ref 0–200)
BASOPHILS NFR BLD AUTO: 0.8 %
BILIRUB SERPL-MCNC: 0.7 MG/DL (ref 0.2–1.2)
BUN SERPL-MCNC: 15 MG/DL (ref 7–25)
BUN/CREAT SERPL: ABNORMAL (CALC) (ref 6–22)
CALCIUM SERPL-MCNC: 9.6 MG/DL (ref 8.6–10.4)
CHLORIDE SERPL-SCNC: 105 MMOL/L (ref 98–110)
CHOLEST SERPL-MCNC: 160 MG/DL
CHOLEST/HDLC SERPL: 2.5 (CALC)
CO2 SERPL-SCNC: 27 MMOL/L (ref 20–32)
CREAT SERPL-MCNC: 0.97 MG/DL (ref 0.6–1)
EOSINOPHIL # BLD AUTO: 329 CELLS/UL (ref 15–500)
EOSINOPHIL NFR BLD AUTO: 5.4 %
ERYTHROCYTE [DISTWIDTH] IN BLOOD BY AUTOMATED COUNT: 13.7 % (ref 11–15)
GFR/BSA.PRED SERPLBLD CYS-BASED-ARV: 63 ML/MIN/1.73M2
GLOBULIN SER CALC-MCNC: 3.3 G/DL (CALC) (ref 1.9–3.7)
GLUCOSE SERPL-MCNC: 107 MG/DL (ref 65–99)
HBA1C MFR BLD: 6.2 % OF TOTAL HGB
HCT VFR BLD AUTO: 48.6 % (ref 35–45)
HDLC SERPL-MCNC: 63 MG/DL
HGB BLD-MCNC: 16 G/DL (ref 11.7–15.5)
LDLC SERPL CALC-MCNC: 82 MG/DL (CALC)
LYMPHOCYTES # BLD AUTO: 1360 CELLS/UL (ref 850–3900)
LYMPHOCYTES NFR BLD AUTO: 22.3 %
MCH RBC QN AUTO: 28.9 PG (ref 27–33)
MCHC RBC AUTO-ENTMCNC: 32.9 G/DL (ref 32–36)
MCV RBC AUTO: 87.9 FL (ref 80–100)
MONOCYTES # BLD AUTO: 409 CELLS/UL (ref 200–950)
MONOCYTES NFR BLD AUTO: 6.7 %
NEUTROPHILS # BLD AUTO: 3953 CELLS/UL (ref 1500–7800)
NEUTROPHILS NFR BLD AUTO: 64.8 %
NONHDLC SERPL-MCNC: 97 MG/DL (CALC)
PLATELET # BLD AUTO: 281 THOUSAND/UL (ref 140–400)
PMV BLD REES-ECKER: 10.6 FL (ref 7.5–12.5)
POTASSIUM SERPL-SCNC: 4.1 MMOL/L (ref 3.5–5.3)
PROT SERPL-MCNC: 7.7 G/DL (ref 6.1–8.1)
RBC # BLD AUTO: 5.53 MILLION/UL (ref 3.8–5.1)
SODIUM SERPL-SCNC: 140 MMOL/L (ref 135–146)
TRIGL SERPL-MCNC: 66 MG/DL
TSH SERPL-ACNC: 1.04 MIU/L (ref 0.4–4.5)
WBC # BLD AUTO: 6.1 THOUSAND/UL (ref 3.8–10.8)

## 2025-04-16 ENCOUNTER — OFFICE VISIT (OUTPATIENT)
Dept: INTERNAL MEDICINE CLINIC | Age: 70
End: 2025-04-16
Payer: COMMERCIAL

## 2025-04-16 VITALS
BODY MASS INDEX: 30.31 KG/M2 | SYSTOLIC BLOOD PRESSURE: 108 MMHG | HEART RATE: 81 BPM | OXYGEN SATURATION: 95 % | DIASTOLIC BLOOD PRESSURE: 62 MMHG | WEIGHT: 200 LBS | HEIGHT: 68 IN | TEMPERATURE: 98.4 F

## 2025-04-16 DIAGNOSIS — Z86.0100 HISTORY OF COLON POLYPS: ICD-10-CM

## 2025-04-16 DIAGNOSIS — E55.9 VITAMIN D DEFICIENCY: ICD-10-CM

## 2025-04-16 DIAGNOSIS — E63.9 NUTRITIONAL DEFICIENCY: ICD-10-CM

## 2025-04-16 DIAGNOSIS — Z12.31 ENCOUNTER FOR SCREENING MAMMOGRAM FOR MALIGNANT NEOPLASM OF BREAST: ICD-10-CM

## 2025-04-16 DIAGNOSIS — R20.2 NUMBNESS AND TINGLING OF LEFT LEG: ICD-10-CM

## 2025-04-16 DIAGNOSIS — Z12.11 SCREENING FOR MALIGNANT NEOPLASM OF COLON: Primary | ICD-10-CM

## 2025-04-16 DIAGNOSIS — Z00.00 ANNUAL PHYSICAL EXAM: ICD-10-CM

## 2025-04-16 DIAGNOSIS — N18.2 TYPE 2 DIABETES MELLITUS WITH STAGE 2 CHRONIC KIDNEY DISEASE, WITHOUT LONG-TERM CURRENT USE OF INSULIN  (HCC): ICD-10-CM

## 2025-04-16 DIAGNOSIS — R20.0 NUMBNESS AND TINGLING OF LEFT LEG: ICD-10-CM

## 2025-04-16 DIAGNOSIS — E11.22 TYPE 2 DIABETES MELLITUS WITH STAGE 2 CHRONIC KIDNEY DISEASE, WITHOUT LONG-TERM CURRENT USE OF INSULIN  (HCC): ICD-10-CM

## 2025-04-16 PROCEDURE — 99213 OFFICE O/P EST LOW 20 MIN: CPT | Performed by: PHYSICIAN ASSISTANT

## 2025-04-16 PROCEDURE — 99397 PER PM REEVAL EST PAT 65+ YR: CPT | Performed by: PHYSICIAN ASSISTANT

## 2025-04-16 NOTE — ASSESSMENT & PLAN NOTE
Improved  Continue mounjaro   Lab Results   Component Value Date    HGBA1C 6.2 (H) 04/07/2025       Orders:    Comprehensive metabolic panel; Future    CBC and differential; Future    TSH, 3rd generation; Future    Vitamin B12; Future    Hemoglobin A1C; Future

## 2025-04-16 NOTE — PROGRESS NOTES
Adult Annual Physical  Name: Jennifer Weems      : 1955      MRN: [id number]  Encounter Provider: Brenda Murray PA-C  Encounter Date: 2025   Encounter department: [facility]    :  Assessment & Plan  Screening for malignant neoplasm of colon    Orders:    Ambulatory Referral to Gastroenterology; Future    History of colon polyps    Orders:    Ambulatory Referral to Gastroenterology; Future    Encounter for screening mammogram for malignant neoplasm of breast    Orders:    Mammo screening bilateral w 3d and cad; Future    Annual physical exam         Type 2 diabetes mellitus with stage 2 chronic kidney disease, without long-term current use of insulin  (HCC)  Improved  Continue mounjaro   Lab Results   Component Value Date    HGBA1C 6.2 (H) 2025       Orders:    Comprehensive metabolic panel; Future    CBC and differential; Future    TSH, 3rd generation; Future    Vitamin B12; Future    Hemoglobin A1C; Future    Numbness and tingling of left leg  Trial B complex  Check labs   Report any pain or worsening   Orders:    Vitamin B12; Future    Vitamin D deficiency    Orders:    Vitamin D 25 hydroxy; Future    Nutritional deficiency    Orders:    Vitamin D 25 hydroxy; Future    Vitamin B12; Future        Preventive Screenings:  - Diabetes Screening: screening not indicated and has diabetes  - Cholesterol Screening: screening not indicated and has hyperlipidemia   - Hepatitis C screening: screening up-to-date   - HIV screening: patient declines   - Cervical cancer screening: screening not indicated   - Breast cancer screening: screening up-to-date   - Colon cancer screening: screening up-to-date   - Lung cancer screening: screening not indicated   - Osteoporosis screening: screening up-to-date     Immunizations:  - Immunizations due: Zoster (Shingrix)         History of Present Illness     Adult Annual Physical:  Patient presents for annual physical. 70 y/o female  "with hx DM, HLD, htn, TASHA presents for f/u  Hga1c 6.2 (was 6.8) checking FBS at home - typically 110-130s  Pt denies episodes of hypoglycemia  No SE with mounjaro, denies nausea/vomiting or change in stools     BP well controlled     Pt reports decreased sensation in LLE - knee to ankle  She states \"it feels like its going to sleep\"  Only affects left side  Not painful, not affecting strength or balance .     Diet and Physical Activity:  - Diet/Nutrition: diabetic diet and consuming 3-5 servings of fruits/vegetables daily.  - Exercise: walking.    General Health:  - Sleep: sleeps well. using lunesta most nights  - Hearing: normal hearing bilateral ears.  - Vision: no vision problems.  - Dental: regular dental visits.    Review of Systems   Constitutional:  Negative for activity change, appetite change, chills, diaphoresis, fatigue and fever.   HENT:  Negative for congestion and sore throat.    Eyes:  Negative for pain and redness.   Respiratory:  Negative for cough and shortness of breath.    Cardiovascular:  Negative for chest pain, palpitations and leg swelling.   Gastrointestinal:  Negative for abdominal pain, constipation, diarrhea and nausea.   Endocrine: Negative for cold intolerance and heat intolerance.   Genitourinary:  Negative for dysuria.   Musculoskeletal:  Negative for arthralgias, back pain and gait problem.   Skin:  Negative for rash.   Neurological:  Positive for numbness (L calf). Negative for dizziness, light-headedness and headaches.   Psychiatric/Behavioral:  Negative for sleep disturbance. The patient is not nervous/anxious.          Objective   /62 (BP Location: Left arm, Patient Position: Sitting, Cuff Size: Standard)   Pulse 81   Temp 98.4 °F (36.9 °C) (Temporal)   Ht 5' 7.5\" (1.715 m)   Wt 90.7 kg (200 lb)   SpO2 95%   BMI 30.86 kg/m²     Physical Exam  Vitals reviewed.   Constitutional:       General: She is not in acute distress.  HENT:      Head: Normocephalic and atraumatic. "      Right Ear: Tympanic membrane, ear canal and external ear normal. There is no impacted cerumen.      Left Ear: Tympanic membrane, ear canal and external ear normal. There is no impacted cerumen.      Nose: Nose normal.      Mouth/Throat:      Mouth: Mucous membranes are moist.   Eyes:      General:         Right eye: No discharge.         Left eye: No discharge.      Extraocular Movements: Extraocular movements intact.      Conjunctiva/sclera: Conjunctivae normal.      Pupils: Pupils are equal, round, and reactive to light.   Cardiovascular:      Rate and Rhythm: Normal rate and regular rhythm.   Pulmonary:      Effort: Pulmonary effort is normal. No respiratory distress.      Breath sounds: Normal breath sounds. No wheezing or rales.   Abdominal:      General: Bowel sounds are normal.   Musculoskeletal:         General: No tenderness or signs of injury.      Cervical back: Normal range of motion. No rigidity.      Right lower leg: No edema.      Left lower leg: No edema.   Lymphadenopathy:      Cervical: No cervical adenopathy.   Skin:     Findings: No erythema or rash.   Neurological:      General: No focal deficit present.      Mental Status: She is alert.      Motor: No weakness.      Gait: Gait normal.      Deep Tendon Reflexes: Reflexes normal.   Psychiatric:         Mood and Affect: Mood normal.

## 2025-04-29 ENCOUNTER — TELEPHONE (OUTPATIENT)
Dept: OBGYN CLINIC | Facility: CLINIC | Age: 70
End: 2025-04-29

## 2025-04-30 ENCOUNTER — PROCEDURE VISIT (OUTPATIENT)
Dept: OBGYN CLINIC | Facility: CLINIC | Age: 70
End: 2025-04-30
Payer: COMMERCIAL

## 2025-04-30 DIAGNOSIS — M25.561 CHRONIC PAIN OF RIGHT KNEE: ICD-10-CM

## 2025-04-30 DIAGNOSIS — G89.29 CHRONIC PAIN OF RIGHT KNEE: ICD-10-CM

## 2025-04-30 DIAGNOSIS — M17.11 PRIMARY OSTEOARTHRITIS OF RIGHT KNEE: Primary | ICD-10-CM

## 2025-04-30 PROCEDURE — 20610 DRAIN/INJ JOINT/BURSA W/O US: CPT | Performed by: PHYSICIAN ASSISTANT

## 2025-04-30 PROCEDURE — 99213 OFFICE O/P EST LOW 20 MIN: CPT | Performed by: PHYSICIAN ASSISTANT

## 2025-04-30 NOTE — PROGRESS NOTES
"Name: Jennifer Weems      : 1955      MRN: [id number]  Encounter Provider: Yao Mane PA-C  Encounter Date: 2025   Encounter department: [facility]  :  Assessment & Plan  Primary osteoarthritis of right knee    Orders:    Large joint arthrocentesis    Chronic pain of right knee    Orders:    Large joint arthrocentesis         Jennifer Weems is a pleasant 69 y.o. female presenting for a follow-up of her activity related right knee pain due to her severe underlying osteoarthritis.  She is still doing well from periodic viscosupplementation, most recently receiving 5 and half months worth of relief.  With the return of her activity related discomfort up to 7 out of 10 on a daily basis, we felt it reasonable to reinitiate the series for her here today.  She consented to and underwent the first of 3 right knee Euflexxa injections as detailed below, which she tolerated well without difficulty or complication.  Postinjection instructions were provided.  We will plan to see her back next week and the week after to complete the series.  All questions addressed    Large joint arthrocentesis: R knee  Universal Protocol:  Consent: Verbal consent obtained.  Risks and benefits: risks, benefits and alternatives were discussed  Consent given by: patient  Time out: Immediately prior to procedure a \"time out\" was called to verify the correct patient, procedure, equipment, support staff and site/side marked as required.  Timeout called at: 2025 3:13 PM.  Site marked: the operative site was marked  Patient identity confirmed: verbally with patient  Supporting Documentation  Indications: pain     Is this a Visco injection? Yes  Non-Pharmacologic Treatments Attempted: Diet, Physical Therapy, Home Exercise and Weight Management Counseling  Pharmacologic Treatments Attempted: csi  Pain Score: 8Procedure Details  Location: knee - R knee  Preparation: Patient was " "prepped and draped in the usual sterile fashion  Needle size: 20 G  Ultrasound guidance: no  Approach: anterolateral  Medications administered: 20 mg Sodium Hyaluronate (Viscosup) 20 MG/2ML  Specialty Pharmacy Supplied: received medications from pharmacy  Patient tolerance: patient tolerated the procedure well with no immediate complications  Dressing:  Sterile dressing applied        Subjective: Right knee follow up    History: Jennifer Weems is a 69 y.o. female presenting today for follow-up of her activity related right knee pain due to her underlying osteoarthritis.  She completed viscosupplementation in September of last year, and reports that it helped for about 5 and half months.  She has had a return of her discomfort over the past month and a half which can bother her up to 7 out of 10 on a daily basis.  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    Weight as of 4/16/25: 90.7 kg (200 lb).    Lab Results   Component Value Date    HGBA1C 6.2 (H) 04/07/2025       Social History     Occupational History    Not on file   Tobacco Use    Smoking status: Never    Smokeless tobacco: Never   Vaping Use    Vaping status: Never Used   Substance and Sexual Activity    Alcohol use: No    Drug use: No    Sexual activity: Not Currently     Partners: Male       Objective:  Right Knee Exam     Muscle Strength   The patient has normal right knee strength.    Tenderness   The patient is experiencing tenderness in the medial joint line and MCL.    Range of Motion   Extension:  0 normal   Flexion:  120 normal     Tests   Varus: negative Valgus: negative  Drawer:  Anterior - negative      Patellar apprehension: negative    Other   Erythema: absent  Scars: absent  Sensation: normal  Pulse: present  Swelling: " none  Effusion: no effusion present    Comments:  Stable at 0, 30, and 90  Thigh and calf soft and nontender  Ambulates with a slightly antalgic gait on the right without assistive device  Grossly distally neurovascular intact          Observations     Right Knee   Negative for effusion.       There were no vitals filed for this visit.    Past Medical History:   Diagnosis Date    Acquired acanthosis nigricans     Anxiety     Depression 10/03/2017    Essential hypertension     HLD (hyperlipidemia)     Hypertension     Prediabetes     Type 2 diabetes mellitus with stage 2 chronic kidney disease, without long-term current use of insulin  ([facility]) 2023    Type 2 diabetes mellitus without complication ([facility])     last assessed: 10/03/2017    Type 2 diabetes mellitus without complication, without long-term current use of insulin ([facility]) 2021    Type 2 diabetes mellitus without complication, without long-term current use of insulin ([facility]) 2022    Formatting of this note might be different from the original.  Patient was found to have hyperglycemia prediabetic and has been on oral hypoglycemic agents for the last 6 or 7 years.  Currently is stable and her hemoglobin A1c has been around 6%.     Last Assessment & Plan:   Formatting of this note might be different from the original.  Diet and lifestyle modification, patient teaching and educat       Past Surgical History:   Procedure Laterality Date    APPENDECTOMY      TOTAL ABDOMINAL HYSTERECTOMY      age 34    US GUIDED INJECTION FOR RESEARCH STUDY  2018       Family History   Problem Relation Age of Onset    Diabetes Mother             Stroke Mother     Depression Mother     No Known Problems Father     No Known Problems Sister     No Known Problems Daughter     No Known Problems Maternal Grandmother     No Known Problems Maternal Grandfather     No Known Problems Paternal Grandmother     No Known Problems Paternal Grandfather     No Known  Problems Brother     No Known Problems Brother     No Known Problems Brother     No Known Problems Maternal Aunt     Schizoaffective Disorder  Cousin         Aunt    No Known Problems Half-Sister          Current Outpatient Medications:     amLODIPine (NORVASC) 10 mg tablet, Take 1 tablet (10 mg total) by mouth daily, Disp: 90 tablet, Rfl: 1    ammonium lactate (LAC-HYDRIN) 12 % lotion, APPLY TOPICALLY TO THE AFFECTED AREA DAILY AS NEEDED FOR DRY SKIN, Disp: 400 g, Rfl: 2    ARIPiprazole (ABILIFY) 5 mg tablet, Take 5 mg by mouth daily, Disp: , Rfl:     Auvelity  MG TBCR, TAKE 1 TABLET BY MOUTH TWICE DAILY IN THE MORNING, Disp: , Rfl:     DULoxetine (CYMBALTA) 60 mg delayed release capsule, Take 60 mg by mouth in the morning., Disp: , Rfl:     eszopiclone (LUNESTA) tablet, Take 3 mg by mouth daily at bedtime as needed, Disp: , Rfl: 0    VgYvl-CiLygs-ZO-B Cmp-C-Biot (INTEGRA PLUS) CAPS, Take 1 tablet by mouth daily, Disp: , Rfl: 0    rosuvastatin (CRESTOR) 10 MG tablet, TAKE 1 TABLET BY MOUTH MONDAY, WEDNESDAY, AND FRIDAY, Disp: 90 tablet, Rfl: 1    solifenacin (VESICARE) 10 MG tablet, Take 1 tablet (10 mg total) by mouth daily, Disp: 90 tablet, Rfl: 1    Tirzepatide (Mounjaro) 12.5 MG/0.5ML SOAJ, Inject 12.5 mg under the skin once a week, Disp: 2 mL, Rfl: 1    triamcinolone (KENALOG) 0.1 % cream, , Disp: , Rfl:     valsartan (DIOVAN) 160 mg tablet, TAKE 1 TABLET(160 MG) BY MOUTH DAILY, Disp: 90 tablet, Rfl: 1    Allergies   Allergen Reactions    Escitalopram Rash    Sulfa Antibiotics Hives and Rash    Food Other (See Comments)     Annotation - [id number]: CORN - causes extreme sleepiness         Review of Systems   Constitutional: Negative.    HENT: Negative.     Eyes: Negative.    Respiratory: Negative.     Cardiovascular: Negative.    Gastrointestinal: Negative.    Endocrine: Negative.    Genitourinary: Negative.    Musculoskeletal:  Positive for arthralgias, joint swelling and myalgias.   Skin: Negative.     Allergic/Immunologic: Negative.    Neurological: Negative.    Hematological: Negative.    Psychiatric/Behavioral: Negative.           Physical Exam  Vitals and nursing note reviewed.   Constitutional:       Appearance: Normal appearance. She is well-developed.      Comments: There is no height or weight on file to calculate BMI.   HENT:      Head: Normocephalic and atraumatic.      Right Ear: External ear normal.      Left Ear: External ear normal.   Eyes:      Extraocular Movements: Extraocular movements intact.      Conjunctiva/sclera: Conjunctivae normal.   Cardiovascular:      Rate and Rhythm: Normal rate.      Pulses: Normal pulses.   Pulmonary:      Effort: Pulmonary effort is normal.   Abdominal:      Palpations: Abdomen is soft.   Musculoskeletal:      Cervical back: Normal range of motion.      Right knee: No effusion.      Comments: See ortho exam   Skin:     General: Skin is warm and dry.   Neurological:      General: No focal deficit present.      Mental Status: She is alert and oriented to person, place, and time. Mental status is at baseline.   Psychiatric:         Mood and Affect: Mood normal.         Behavior: Behavior normal.         Thought Content: Thought content normal.         Judgment: Judgment normal.       This document was created using speech voice recognition software.   Grammatical errors, random word insertions, pronoun errors, and incomplete sentences are an occasional consequence of this system due to software limitations, ambient noise, and hardware issues.   Any formal questions or concerns about content, text, or information contained within the body of this dictation should be directly addressed to the provider for clarification.

## 2025-05-07 ENCOUNTER — PROCEDURE VISIT (OUTPATIENT)
Dept: OBGYN CLINIC | Facility: CLINIC | Age: 70
End: 2025-05-07
Payer: COMMERCIAL

## 2025-05-07 DIAGNOSIS — M17.11 PRIMARY OSTEOARTHRITIS OF RIGHT KNEE: Primary | ICD-10-CM

## 2025-05-07 DIAGNOSIS — G89.29 CHRONIC PAIN OF RIGHT KNEE: ICD-10-CM

## 2025-05-07 DIAGNOSIS — M25.561 CHRONIC PAIN OF RIGHT KNEE: ICD-10-CM

## 2025-05-07 PROCEDURE — 20610 DRAIN/INJ JOINT/BURSA W/O US: CPT | Performed by: ORTHOPAEDIC SURGERY

## 2025-05-07 NOTE — PROGRESS NOTES
"Name: Jennifer Weems      : 1955      MRN: [id number]  Encounter Provider: Leonel Rey DO  Encounter Date: 2025   Encounter department: [facility]  :  Assessment & Plan  Primary osteoarthritis of right knee    Orders:    Large joint arthrocentesis: R knee    Chronic pain of right knee    Orders:    Large joint arthrocentesis: R knee         Jennifer Weems is a pleasant 69 y.o. female presenting today for the second of 3 Euflexxa injections for her right knee osteoarthritis and activity related pain.  She consented to and underwent the injection as detailed below, which she tolerated well without difficulty or complication.  Postinjection instructions were provided.  We will plan to see her back next week to complete the series.    Large joint arthrocentesis: R knee    Performed by: Leonel Rey DO  Authorized by: Leonel Rey DO    Universal Protocol:  Consent: Verbal consent obtained.  Risks and benefits: risks, benefits and alternatives were discussed  Consent given by: patient  Time out: Immediately prior to procedure a \"time out\" was called to verify the correct patient, procedure, equipment, support staff and site/side marked as required.  Timeout called at: 2025 3:15 PM.  Site marked: the operative site was marked  Patient identity confirmed: verbally with patient  Supporting Documentation  Indications: pain     Is this a Visco injection? Yes  Non-Pharmacologic Treatments Attempted: Diet and Physical Therapy  Pharmacologic Treatments Attempted: tylenol  Pain Score: 6Procedure Details  Location: knee - R knee  Preparation: Patient was prepped and draped in the usual sterile fashion  Needle size: 20 G  Ultrasound guidance: no  Approach: anterolateral  Medications administered: 20 mg Sodium Hyaluronate (Viscosup) 20 MG/2ML  Specialty Pharmacy Supplied: received medications from pharmacy  Patient tolerance: patient tolerated the procedure well with " "no immediate complications  Dressing:  Sterile dressing applied        Subjective: Right knee Euflexxa #2    History: Jennifer Weems is a 69 y.o. female presenting for the second of 3 Euflexxa injections for her right knee osteoarthritis    Estimated body mass index is 30.86 kg/m² as calculated from the following:    Height as of 4/16/25: 5' 7.5\" (1.715 m).    Weight as of 4/16/25: 90.7 kg (200 lb).    Lab Results   Component Value Date    HGBA1C 6.2 (H) 04/07/2025       Social History     Occupational History    Not on file   Tobacco Use    Smoking status: Never    Smokeless tobacco: Never   Vaping Use    Vaping status: Never Used   Substance and Sexual Activity    Alcohol use: No    Drug use: No    Sexual activity: Not Currently     Partners: Male       Objective:  Ortho Exam    There were no vitals filed for this visit.    Past Medical History:   Diagnosis Date    Acquired acanthosis nigricans     Anxiety     Depression 10/03/2017    Essential hypertension     HLD (hyperlipidemia)     Hypertension 2007    Prediabetes     Type 2 diabetes mellitus with stage 2 chronic kidney disease, without long-term current use of insulin  (HCC) 09/05/2023    Type 2 diabetes mellitus without complication (HCC)     last assessed: 10/03/2017    Type 2 diabetes mellitus without complication, without long-term current use of insulin ([facility]) 03/04/2021    Type 2 diabetes mellitus without complication, without long-term current use of insulin ([facility]) 11/29/2022    Formatting of this note might be different from the original.  Patient was found to have hyperglycemia prediabetic and has been on oral hypoglycemic agents for the last 6 or 7 years.  Currently is stable and her hemoglobin A1c has been around 6%.     Last Assessment & Plan:   Formatting of this note might be different from the original.  Diet and lifestyle modification, patient teaching and educat       Past Surgical History:   Procedure Laterality Date    APPENDECTOMY      " TOTAL ABDOMINAL HYSTERECTOMY  1989    age 34    US GUIDED INJECTION FOR RESEARCH STUDY  2018       Family History   Problem Relation Age of Onset    Diabetes Mother             Stroke Mother     Depression Mother     No Known Problems Father     No Known Problems Sister     No Known Problems Daughter     No Known Problems Maternal Grandmother     No Known Problems Maternal Grandfather     No Known Problems Paternal Grandmother     No Known Problems Paternal Grandfather     No Known Problems Brother     No Known Problems Brother     No Known Problems Brother     No Known Problems Maternal Aunt     Schizoaffective Disorder  Cousin         Aunt    No Known Problems Half-Sister          Current Outpatient Medications:     amLODIPine (NORVASC) 10 mg tablet, Take 1 tablet (10 mg total) by mouth daily, Disp: 90 tablet, Rfl: 1    ammonium lactate (LAC-HYDRIN) 12 % lotion, APPLY TOPICALLY TO THE AFFECTED AREA DAILY AS NEEDED FOR DRY SKIN, Disp: 400 g, Rfl: 2    ARIPiprazole (ABILIFY) 5 mg tablet, Take 5 mg by mouth daily, Disp: , Rfl:     Auvelity  MG TBCR, TAKE 1 TABLET BY MOUTH TWICE DAILY IN THE MORNING, Disp: , Rfl:     DULoxetine (CYMBALTA) 60 mg delayed release capsule, Take 60 mg by mouth in the morning., Disp: , Rfl:     eszopiclone (LUNESTA) tablet, Take 3 mg by mouth daily at bedtime as needed, Disp: , Rfl: 0    HmEgu-KcAskv-PS-B Cmp-C-Biot (INTEGRA PLUS) CAPS, Take 1 tablet by mouth daily, Disp: , Rfl: 0    rosuvastatin (CRESTOR) 10 MG tablet, TAKE 1 TABLET BY MOUTH MONDAY, WEDNESDAY, AND FRIDAY, Disp: 90 tablet, Rfl: 1    solifenacin (VESICARE) 10 MG tablet, Take 1 tablet (10 mg total) by mouth daily, Disp: 90 tablet, Rfl: 1    Tirzepatide (Mounjaro) 12.5 MG/0.5ML SOAJ, Inject 12.5 mg under the skin once a week, Disp: 2 mL, Rfl: 1    triamcinolone (KENALOG) 0.1 % cream, , Disp: , Rfl:     valsartan (DIOVAN) 160 mg tablet, TAKE 1 TABLET(160 MG) BY MOUTH DAILY, Disp: 90 tablet, Rfl: 1    Allergies    Allergen Reactions    Escitalopram Rash    Sulfa Antibiotics Hives and Rash    Food Other (See Comments)     Annotation - [id number]: CORN - causes extreme sleepiness         Review of Systems      Physical Exam    Scribe Attestation      I,:  Yao Mane PA-C am acting as a scribe while in the presence of the attending physician.:       I,:  Leonel Rey, DO personally performed the services described in this documentation    as scribed in my presence.:             This document was created using speech voice recognition software.   Grammatical errors, random word insertions, pronoun errors, and incomplete sentences are an occasional consequence of this system due to software limitations, ambient noise, and hardware issues.   Any formal questions or concerns about content, text, or information contained within the body of this dictation should be directly addressed to the provider for clarification.

## 2025-05-11 DIAGNOSIS — E11.22 TYPE 2 DIABETES MELLITUS WITH STAGE 2 CHRONIC KIDNEY DISEASE, WITHOUT LONG-TERM CURRENT USE OF INSULIN  (HCC): ICD-10-CM

## 2025-05-11 DIAGNOSIS — N18.2 TYPE 2 DIABETES MELLITUS WITH STAGE 2 CHRONIC KIDNEY DISEASE, WITHOUT LONG-TERM CURRENT USE OF INSULIN  (HCC): ICD-10-CM

## 2025-05-11 RX ORDER — TIRZEPATIDE 12.5 MG/.5ML
12.5 INJECTION, SOLUTION SUBCUTANEOUS WEEKLY
Qty: 2 ML | Refills: 1 | Status: SHIPPED | OUTPATIENT
Start: 2025-05-11

## 2025-05-12 DIAGNOSIS — Z00.6 ENCOUNTER FOR EXAMINATION FOR NORMAL COMPARISON OR CONTROL IN CLINICAL RESEARCH PROGRAM: ICD-10-CM

## 2025-05-14 ENCOUNTER — PROCEDURE VISIT (OUTPATIENT)
Dept: OBGYN CLINIC | Facility: CLINIC | Age: 70
End: 2025-05-14
Payer: COMMERCIAL

## 2025-05-14 ENCOUNTER — APPOINTMENT (OUTPATIENT)
Dept: LAB | Facility: AMBULARY SURGERY CENTER | Age: 70
End: 2025-05-14

## 2025-05-14 VITALS — BODY MASS INDEX: 30.31 KG/M2 | HEIGHT: 68 IN | WEIGHT: 200 LBS

## 2025-05-14 DIAGNOSIS — M17.11 PRIMARY OSTEOARTHRITIS OF RIGHT KNEE: Primary | ICD-10-CM

## 2025-05-14 DIAGNOSIS — G89.29 CHRONIC PAIN OF RIGHT KNEE: ICD-10-CM

## 2025-05-14 DIAGNOSIS — Z00.6 ENCOUNTER FOR EXAMINATION FOR NORMAL COMPARISON OR CONTROL IN CLINICAL RESEARCH PROGRAM: ICD-10-CM

## 2025-05-14 DIAGNOSIS — M25.561 CHRONIC PAIN OF RIGHT KNEE: ICD-10-CM

## 2025-05-14 PROCEDURE — 36415 COLL VENOUS BLD VENIPUNCTURE: CPT

## 2025-05-14 PROCEDURE — 20610 DRAIN/INJ JOINT/BURSA W/O US: CPT | Performed by: PHYSICIAN ASSISTANT

## 2025-05-14 NOTE — PROGRESS NOTES
"Name: Jennifer Weems      : 1955      MRN: [id number]  Encounter Provider: Leonel Rey DO  Encounter Date: 2025   Encounter department: [facility]  :  Assessment & Plan  Primary osteoarthritis of right knee    Orders:    Large joint arthrocentesis    Chronic pain of right knee    Orders:    Large joint arthrocentesis         Jennfier Weems is a pleasant 69 y.o. female presenting today for the 3rd of 3 Euflexxa injections for her right knee osteoarthritis and activity related pain.  She consented to and underwent the injection as detailed below, which she tolerated well without difficulty or complication.  Postinjection instructions were provided.  We will plan to see her back as needed.    Large joint arthrocentesis: R knee    Performed by: Yao Mane PA-C  Authorized by: Yao Mane PA-C    Universal Protocol:  Consent: Verbal consent obtained  Risks and benefits: risks, benefits and alternatives were discussed  Consent given by: patient  Time out: Immediately prior to procedure a \"time out\" was called to verify the correct patient, procedure, equipment, support staff and site/side marked as required.  Timeout called at: 2025 3:17 PM.  Site marked: the operative site was marked  Patient identity confirmed: verbally with patient  Supporting Documentation  Indications: pain     Is this a Visco injection? Yes  Non-Pharmacologic Treatments Attempted: Diet and Physical Therapy  Pharmacologic Treatments Attempted: tylenol  Pain Score: 4Procedure Details  Location: knee - R knee  Preparation: Patient was prepped and draped in the usual sterile fashion  Needle size: 20 G  Ultrasound guidance: no  Approach: anterolateral  Medications administered: 20 mg Sodium Hyaluronate (Viscosup) 20 MG/2ML  Specialty Pharmacy Supplied: received medications from pharmacy  Patient tolerance: patient tolerated the procedure well with no immediate " "complications  Dressing:  Sterile dressing applied        Subjective: Right knee Euflexxa #3    History: Jennifer Weems is a 69 y.o. female presenting for the 3rd of 3 Euflexxa injections for her right knee osteoarthritis    Estimated body mass index is 30.86 kg/m² as calculated from the following:    Height as of this encounter: 5' 7.5\" (1.715 m).    Weight as of this encounter: 90.7 kg (200 lb).    Lab Results   Component Value Date    HGBA1C 6.2 (H) 04/07/2025       Social History     Occupational History    Not on file   Tobacco Use    Smoking status: Never    Smokeless tobacco: Never   Vaping Use    Vaping status: Never Used   Substance and Sexual Activity    Alcohol use: No    Drug use: No    Sexual activity: Not Currently     Partners: Male       Objective:  Ortho Exam    There were no vitals filed for this visit.    Past Medical History:   Diagnosis Date    Acquired acanthosis nigricans     Anxiety     Depression 10/03/2017    Essential hypertension     HLD (hyperlipidemia)     Hypertension 2007    Prediabetes     Type 2 diabetes mellitus with stage 2 chronic kidney disease, without long-term current use of insulin  ([facility]) 09/05/2023    Type 2 diabetes mellitus without complication (HCC)     last assessed: 10/03/2017    Type 2 diabetes mellitus without complication, without long-term current use of insulin (HCC) 03/04/2021    Type 2 diabetes mellitus without complication, without long-term current use of insulin ([facility]) 11/29/2022    Formatting of this note might be different from the original.  Patient was found to have hyperglycemia prediabetic and has been on oral hypoglycemic agents for the last 6 or 7 years.  Currently is stable and her hemoglobin A1c has been around 6%.     Last Assessment & Plan:   Formatting of this note might be different from the original.  Diet and lifestyle modification, patient teaching and educat       Past Surgical History:   Procedure Laterality Date    APPENDECTOMY      " TOTAL ABDOMINAL HYSTERECTOMY  1989    age 34    US GUIDED INJECTION FOR RESEARCH STUDY  2018       Family History   Problem Relation Age of Onset    Diabetes Mother             Stroke Mother     Depression Mother     No Known Problems Father     No Known Problems Sister     No Known Problems Daughter     No Known Problems Maternal Grandmother     No Known Problems Maternal Grandfather     No Known Problems Paternal Grandmother     No Known Problems Paternal Grandfather     No Known Problems Brother     No Known Problems Brother     No Known Problems Brother     No Known Problems Maternal Aunt     Schizoaffective Disorder  Cousin         Aunt    No Known Problems Half-Sister          Current Outpatient Medications:     amLODIPine (NORVASC) 10 mg tablet, Take 1 tablet (10 mg total) by mouth daily, Disp: 90 tablet, Rfl: 1    ammonium lactate (LAC-HYDRIN) 12 % lotion, APPLY TOPICALLY TO THE AFFECTED AREA DAILY AS NEEDED FOR DRY SKIN, Disp: 400 g, Rfl: 2    ARIPiprazole (ABILIFY) 5 mg tablet, Take 5 mg by mouth daily, Disp: , Rfl:     Auvelity  MG TBCR, TAKE 1 TABLET BY MOUTH TWICE DAILY IN THE MORNING, Disp: , Rfl:     DULoxetine (CYMBALTA) 60 mg delayed release capsule, Take 60 mg by mouth in the morning., Disp: , Rfl:     eszopiclone (LUNESTA) tablet, Take 3 mg by mouth daily at bedtime as needed, Disp: , Rfl: 0    EeOoo-FyGgrt-LA-B Cmp-C-Biot (INTEGRA PLUS) CAPS, Take 1 tablet by mouth daily, Disp: , Rfl: 0    rosuvastatin (CRESTOR) 10 MG tablet, TAKE 1 TABLET BY MOUTH MONDAY, WEDNESDAY, AND FRIDAY, Disp: 90 tablet, Rfl: 1    solifenacin (VESICARE) 10 MG tablet, Take 1 tablet (10 mg total) by mouth daily, Disp: 90 tablet, Rfl: 1    Tirzepatide (Mounjaro) 12.5 MG/0.5ML SOAJ, Inject 12.5 mg under the skin once a week, Disp: 2 mL, Rfl: 1    triamcinolone (KENALOG) 0.1 % cream, , Disp: , Rfl:     valsartan (DIOVAN) 160 mg tablet, TAKE 1 TABLET(160 MG) BY MOUTH DAILY, Disp: 90 tablet, Rfl: 1    Allergies    Allergen Reactions    Escitalopram Rash    Sulfa Antibiotics Hives and Rash    Food Other (See Comments)     Annotation - [id number]: CORN - causes extreme sleepiness         Review of Systems      Physical Exam    Scribe Attestation      I,:   am acting as a scribe while in the presence of the attending physician.:       I,:   personally performed the services described in this documentation    as scribed in my presence.:             This document was created using speech voice recognition software.   Grammatical errors, random word insertions, pronoun errors, and incomplete sentences are an occasional consequence of this system due to software limitations, ambient noise, and hardware issues.   Any formal questions or concerns about content, text, or information contained within the body of this dictation should be directly addressed to the provider for clarification.

## 2025-05-25 LAB
APOB+LDLR+PCSK9 GENE MUT ANL BLD/T: NOT DETECTED
BRCA1+BRCA2 DEL+DUP + FULL MUT ANL BLD/T: NOT DETECTED
MLH1+MSH2+MSH6+PMS2 GN DEL+DUP+FUL M: NOT DETECTED

## 2025-06-03 DIAGNOSIS — E11.9 TYPE 2 DIABETES MELLITUS WITHOUT COMPLICATION, WITHOUT LONG-TERM CURRENT USE OF INSULIN (HCC): ICD-10-CM

## 2025-06-03 DIAGNOSIS — N39.41 URGE INCONTINENCE: ICD-10-CM

## 2025-06-03 DIAGNOSIS — R32 URINARY INCONTINENCE, UNSPECIFIED TYPE: ICD-10-CM

## 2025-06-04 RX ORDER — SOLIFENACIN SUCCINATE 10 MG/1
10 TABLET, FILM COATED ORAL DAILY
Qty: 90 TABLET | Refills: 1 | OUTPATIENT
Start: 2025-06-04

## 2025-06-04 RX ORDER — SOLIFENACIN SUCCINATE 10 MG/1
10 TABLET, FILM COATED ORAL DAILY
Qty: 90 TABLET | Refills: 1 | Status: SHIPPED | OUTPATIENT
Start: 2025-06-04

## 2025-06-19 DIAGNOSIS — N18.2 TYPE 2 DIABETES MELLITUS WITH STAGE 2 CHRONIC KIDNEY DISEASE, WITHOUT LONG-TERM CURRENT USE OF INSULIN  (HCC): ICD-10-CM

## 2025-06-19 DIAGNOSIS — E11.22 TYPE 2 DIABETES MELLITUS WITH STAGE 2 CHRONIC KIDNEY DISEASE, WITHOUT LONG-TERM CURRENT USE OF INSULIN  (HCC): ICD-10-CM

## 2025-06-24 DIAGNOSIS — N18.2 TYPE 2 DIABETES MELLITUS WITH STAGE 2 CHRONIC KIDNEY DISEASE, WITHOUT LONG-TERM CURRENT USE OF INSULIN  (HCC): Primary | ICD-10-CM

## 2025-06-24 DIAGNOSIS — E11.22 TYPE 2 DIABETES MELLITUS WITH STAGE 2 CHRONIC KIDNEY DISEASE, WITHOUT LONG-TERM CURRENT USE OF INSULIN  (HCC): Primary | ICD-10-CM

## 2025-06-24 RX ORDER — TIRZEPATIDE 15 MG/.5ML
15 INJECTION, SOLUTION SUBCUTANEOUS WEEKLY
Qty: 2 ML | Refills: 2 | Status: SHIPPED | OUTPATIENT
Start: 2025-06-24

## 2025-06-24 RX ORDER — TIRZEPATIDE 12.5 MG/.5ML
12.5 INJECTION, SOLUTION SUBCUTANEOUS WEEKLY
Qty: 2 ML | Refills: 0 | OUTPATIENT
Start: 2025-06-24

## 2025-07-21 ENCOUNTER — PREP FOR PROCEDURE (OUTPATIENT)
Age: 70
End: 2025-07-21

## 2025-07-21 ENCOUNTER — TELEPHONE (OUTPATIENT)
Age: 70
End: 2025-07-21

## 2025-07-21 DIAGNOSIS — Z12.11 SCREENING FOR COLON CANCER: Primary | ICD-10-CM

## 2025-07-21 NOTE — TELEPHONE ENCOUNTER
Scheduled date of colonoscopy (as of today):11/12/25  Physician performing colonoscopy:Dr Montoya   Location of colonoscopy:[facility]   Bowel prep reviewed with patient: reviewed giuseppe/dul prep instructions with pt, pt requesting instructions to be mailed to address on file. Please send diabetic instructions also.  Instructions reviewed with patient by:darcy  Clearances: na  diabetic , hold mounjaro 7 days

## 2025-07-21 NOTE — TELEPHONE ENCOUNTER
Scheduled date of colonoscopy (as of today):9/17/25  Physician performing colonoscopy:DR MCMAHON  Location of colonoscopy:[facility]  Bowel prep reviewed with patient:BECCA/DUL BEING MAILED  Instructions reviewed with patient by:DAGMAR  Clearances: NA    DIABETIC MOUNJARO

## 2025-07-21 NOTE — TELEPHONE ENCOUNTER
07/21/25  Screened by: Roya Myers    Referring Provider     Pre- Screening:     There is no height or weight on file to calculate BMI.30/86  Has patient been referred for a routine screening Colonoscopy? yes  Is the patient between 45-75 years old? yes      Previous Colonoscopy yes   If yes:    Date: 2015    Facility:     Reason:           Does the patient want to see a Gastroenterologist prior to their procedure OR are they having any GI symptoms? no    Has the patient been hospitalized or had abdominal surgery in the past 6 months? no    Does the patient use supplemental oxygen? no    Does the patient take Coumadin, Lovenox, Plavix, Elliquis, Xarelto, or other blood thinning medication? no    Has the patient had a stroke, cardiac event, or stent placed in the past year? no      If patient is between 45yrs - 49yrs, please advise patient that we will have to confirm benefits & coverage with their insurance company for a routine screening colonoscopy.